# Patient Record
Sex: FEMALE | Race: BLACK OR AFRICAN AMERICAN | NOT HISPANIC OR LATINO | URBAN - METROPOLITAN AREA
[De-identification: names, ages, dates, MRNs, and addresses within clinical notes are randomized per-mention and may not be internally consistent; named-entity substitution may affect disease eponyms.]

---

## 2021-01-01 ENCOUNTER — INPATIENT (INPATIENT)
Facility: HOSPITAL | Age: 71
LOS: 12 days | DRG: 64 | End: 2021-02-12
Attending: INTERNAL MEDICINE | Admitting: HOSPITALIST
Payer: MEDICARE

## 2021-01-01 ENCOUNTER — EMERGENCY (EMERGENCY)
Facility: HOSPITAL | Age: 71
LOS: 1 days | Discharge: ANOTHER HOSPITAL | End: 2021-01-01
Attending: EMERGENCY MEDICINE | Admitting: EMERGENCY MEDICINE
Payer: MEDICARE

## 2021-01-01 VITALS — RESPIRATION RATE: 34 BRPM

## 2021-01-01 VITALS
RESPIRATION RATE: 18 BRPM | SYSTOLIC BLOOD PRESSURE: 150 MMHG | OXYGEN SATURATION: 96 % | HEART RATE: 107 BPM | TEMPERATURE: 98 F | DIASTOLIC BLOOD PRESSURE: 100 MMHG

## 2021-01-01 VITALS
RESPIRATION RATE: 39 BRPM | SYSTOLIC BLOOD PRESSURE: 162 MMHG | OXYGEN SATURATION: 98 % | DIASTOLIC BLOOD PRESSURE: 95 MMHG | HEART RATE: 113 BPM | TEMPERATURE: 100 F

## 2021-01-01 VITALS
DIASTOLIC BLOOD PRESSURE: 106 MMHG | OXYGEN SATURATION: 95 % | HEIGHT: 64 IN | HEART RATE: 112 BPM | RESPIRATION RATE: 18 BRPM | TEMPERATURE: 100 F | SYSTOLIC BLOOD PRESSURE: 168 MMHG | WEIGHT: 220.02 LBS

## 2021-01-01 DIAGNOSIS — E11.9 TYPE 2 DIABETES MELLITUS WITHOUT COMPLICATIONS: ICD-10-CM

## 2021-01-01 DIAGNOSIS — Z00.00 ENCOUNTER FOR GENERAL ADULT MEDICAL EXAMINATION WITHOUT ABNORMAL FINDINGS: ICD-10-CM

## 2021-01-01 DIAGNOSIS — I10 ESSENTIAL (PRIMARY) HYPERTENSION: ICD-10-CM

## 2021-01-01 DIAGNOSIS — A41.9 SEPSIS, UNSPECIFIED ORGANISM: ICD-10-CM

## 2021-01-01 DIAGNOSIS — N39.0 URINARY TRACT INFECTION, SITE NOT SPECIFIED: ICD-10-CM

## 2021-01-01 DIAGNOSIS — J96.01 ACUTE RESPIRATORY FAILURE WITH HYPOXIA: ICD-10-CM

## 2021-01-01 DIAGNOSIS — G93.40 ENCEPHALOPATHY, UNSPECIFIED: ICD-10-CM

## 2021-01-01 DIAGNOSIS — I63.22 CEREBRAL INFARCTION DUE TO UNSPECIFIED OCCLUSION OR STENOSIS OF BASILAR ARTERY: ICD-10-CM

## 2021-01-01 DIAGNOSIS — I65.09 OCCLUSION AND STENOSIS OF UNSPECIFIED VERTEBRAL ARTERY: ICD-10-CM

## 2021-01-01 DIAGNOSIS — E11.65 TYPE 2 DIABETES MELLITUS WITH HYPERGLYCEMIA: ICD-10-CM

## 2021-01-01 DIAGNOSIS — U07.1 COVID-19: ICD-10-CM

## 2021-01-01 DIAGNOSIS — E78.5 HYPERLIPIDEMIA, UNSPECIFIED: ICD-10-CM

## 2021-01-01 LAB
-  AMIKACIN: SIGNIFICANT CHANGE UP
-  AMOXICILLIN/CLAVULANIC ACID: SIGNIFICANT CHANGE UP
-  AMPICILLIN/SULBACTAM: SIGNIFICANT CHANGE UP
-  AMPICILLIN: SIGNIFICANT CHANGE UP
-  AZTREONAM: SIGNIFICANT CHANGE UP
-  CEFAZOLIN: SIGNIFICANT CHANGE UP
-  CEFEPIME: SIGNIFICANT CHANGE UP
-  CEFOXITIN: SIGNIFICANT CHANGE UP
-  CEFTRIAXONE: SIGNIFICANT CHANGE UP
-  CIPROFLOXACIN: SIGNIFICANT CHANGE UP
-  ERTAPENEM: SIGNIFICANT CHANGE UP
-  GENTAMICIN: SIGNIFICANT CHANGE UP
-  LEVOFLOXACIN: SIGNIFICANT CHANGE UP
-  MEROPENEM: SIGNIFICANT CHANGE UP
-  NITROFURANTOIN: SIGNIFICANT CHANGE UP
-  PIPERACILLIN/TAZOBACTAM: SIGNIFICANT CHANGE UP
-  TIGECYCLINE: SIGNIFICANT CHANGE UP
-  TOBRAMYCIN: SIGNIFICANT CHANGE UP
-  TRIMETHOPRIM/SULFAMETHOXAZOLE: SIGNIFICANT CHANGE UP
24R-OH-CALCIDIOL SERPL-MCNC: 23.8 NG/ML — LOW (ref 30–80)
A1C WITH ESTIMATED AVERAGE GLUCOSE RESULT: 10.1 % — HIGH (ref 4–5.6)
ALBUMIN SERPL ELPH-MCNC: 2.4 G/DL — LOW (ref 3.3–5)
ALBUMIN SERPL ELPH-MCNC: 2.5 G/DL — LOW (ref 3.3–5)
ALBUMIN SERPL ELPH-MCNC: 2.8 G/DL — LOW (ref 3.3–5)
ALBUMIN SERPL ELPH-MCNC: 2.9 G/DL — LOW (ref 3.3–5)
ALBUMIN SERPL ELPH-MCNC: 2.9 G/DL — LOW (ref 3.3–5)
ALBUMIN SERPL ELPH-MCNC: 3 G/DL — LOW (ref 3.3–5)
ALBUMIN SERPL ELPH-MCNC: 3.1 G/DL — LOW (ref 3.3–5)
ALBUMIN SERPL ELPH-MCNC: 3.1 G/DL — LOW (ref 3.3–5)
ALBUMIN SERPL ELPH-MCNC: 3.2 G/DL — LOW (ref 3.3–5)
ALBUMIN SERPL ELPH-MCNC: 3.4 G/DL — SIGNIFICANT CHANGE UP (ref 3.3–5)
ALBUMIN SERPL ELPH-MCNC: 3.5 G/DL — SIGNIFICANT CHANGE UP (ref 3.3–5)
ALBUMIN SERPL ELPH-MCNC: 3.6 G/DL — SIGNIFICANT CHANGE UP (ref 3.3–5)
ALBUMIN SERPL ELPH-MCNC: 3.7 G/DL — SIGNIFICANT CHANGE UP (ref 3.3–5)
ALBUMIN SERPL ELPH-MCNC: 3.7 G/DL — SIGNIFICANT CHANGE UP (ref 3.3–5)
ALBUMIN SERPL ELPH-MCNC: 3.8 G/DL — SIGNIFICANT CHANGE UP (ref 3.3–5)
ALP SERPL-CCNC: 108 U/L — SIGNIFICANT CHANGE UP (ref 40–120)
ALP SERPL-CCNC: 122 U/L — HIGH (ref 40–120)
ALP SERPL-CCNC: 126 U/L — HIGH (ref 40–120)
ALP SERPL-CCNC: 132 U/L — HIGH (ref 40–120)
ALP SERPL-CCNC: 133 U/L — HIGH (ref 40–120)
ALP SERPL-CCNC: 140 U/L — HIGH (ref 40–120)
ALP SERPL-CCNC: 63 U/L — SIGNIFICANT CHANGE UP (ref 40–120)
ALP SERPL-CCNC: 65 U/L — SIGNIFICANT CHANGE UP (ref 40–120)
ALP SERPL-CCNC: 65 U/L — SIGNIFICANT CHANGE UP (ref 40–120)
ALP SERPL-CCNC: 68 U/L — SIGNIFICANT CHANGE UP (ref 40–120)
ALP SERPL-CCNC: 71 U/L — SIGNIFICANT CHANGE UP (ref 40–120)
ALP SERPL-CCNC: 73 U/L — SIGNIFICANT CHANGE UP (ref 40–120)
ALP SERPL-CCNC: 75 U/L — SIGNIFICANT CHANGE UP (ref 40–120)
ALP SERPL-CCNC: 77 U/L — SIGNIFICANT CHANGE UP (ref 40–120)
ALP SERPL-CCNC: 79 U/L — SIGNIFICANT CHANGE UP (ref 40–120)
ALP SERPL-CCNC: 80 U/L — SIGNIFICANT CHANGE UP (ref 40–120)
ALP SERPL-CCNC: 90 U/L — SIGNIFICANT CHANGE UP (ref 40–120)
ALT FLD-CCNC: 221 U/L — HIGH (ref 10–45)
ALT FLD-CCNC: 256 U/L — HIGH (ref 10–45)
ALT FLD-CCNC: 305 U/L — HIGH (ref 10–45)
ALT FLD-CCNC: 37 U/L — SIGNIFICANT CHANGE UP (ref 10–45)
ALT FLD-CCNC: 45 U/L — SIGNIFICANT CHANGE UP (ref 10–45)
ALT FLD-CCNC: 46 U/L — HIGH (ref 10–45)
ALT FLD-CCNC: 48 U/L — HIGH (ref 10–45)
ALT FLD-CCNC: 49 U/L — HIGH (ref 10–45)
ALT FLD-CCNC: 56 U/L — HIGH (ref 10–45)
ALT FLD-CCNC: 66 U/L — HIGH (ref 10–45)
ALT FLD-CCNC: 79 U/L — HIGH (ref 10–45)
ALT FLD-CCNC: 85 U/L — HIGH (ref 10–45)
ALT FLD-CCNC: SIGNIFICANT CHANGE UP U/L (ref 4–33)
AMYLASE P1 CFR SERPL: 1687 U/L — HIGH (ref 25–125)
AMYLASE P1 CFR SERPL: 1713 U/L — HIGH (ref 25–125)
ANION GAP SERPL CALC-SCNC: 10 MMOL/L — SIGNIFICANT CHANGE UP (ref 5–17)
ANION GAP SERPL CALC-SCNC: 10 MMOL/L — SIGNIFICANT CHANGE UP (ref 5–17)
ANION GAP SERPL CALC-SCNC: 11 MMOL/L — SIGNIFICANT CHANGE UP (ref 5–17)
ANION GAP SERPL CALC-SCNC: 11 MMOL/L — SIGNIFICANT CHANGE UP (ref 7–14)
ANION GAP SERPL CALC-SCNC: 12 MMOL/L — SIGNIFICANT CHANGE UP (ref 5–17)
ANION GAP SERPL CALC-SCNC: 13 MMOL/L — SIGNIFICANT CHANGE UP (ref 5–17)
ANION GAP SERPL CALC-SCNC: 14 MMOL/L — SIGNIFICANT CHANGE UP (ref 5–17)
ANION GAP SERPL CALC-SCNC: 14 MMOL/L — SIGNIFICANT CHANGE UP (ref 5–17)
ANION GAP SERPL CALC-SCNC: 14 MMOL/L — SIGNIFICANT CHANGE UP (ref 7–14)
ANION GAP SERPL CALC-SCNC: 15 MMOL/L — SIGNIFICANT CHANGE UP (ref 5–17)
ANION GAP SERPL CALC-SCNC: 15 MMOL/L — SIGNIFICANT CHANGE UP (ref 5–17)
APPEARANCE UR: CLEAR — SIGNIFICANT CHANGE UP
APPEARANCE UR: CLEAR — SIGNIFICANT CHANGE UP
APTT 50/50 2HOUR INCUB: 41.7 SEC — HIGH (ref 27.5–36.5)
APTT 50/50 MIX COMMENT: SIGNIFICANT CHANGE UP
APTT BLD: 20.9 SEC — LOW (ref 27.5–35.5)
APTT BLD: 23.3 SEC — LOW (ref 27.5–35.5)
APTT BLD: 25.4 SEC — LOW (ref 27.5–35.5)
APTT BLD: 25.4 SEC — LOW (ref 27.5–35.5)
APTT BLD: 26.3 SEC — LOW (ref 27.5–35.5)
APTT BLD: 26.7 SEC — LOW (ref 27.5–35.5)
APTT BLD: 37.7 SEC — HIGH (ref 27.5–36.5)
APTT BLD: 41.2 SEC — HIGH (ref 27.5–35.5)
APTT BLD: 41.2 SEC — HIGH (ref 27.5–35.5)
AST SERPL-CCNC: 239 U/L — HIGH (ref 10–40)
AST SERPL-CCNC: 337 U/L — HIGH (ref 10–40)
AST SERPL-CCNC: 37 U/L — SIGNIFICANT CHANGE UP (ref 10–40)
AST SERPL-CCNC: 38 U/L — SIGNIFICANT CHANGE UP (ref 10–40)
AST SERPL-CCNC: 40 U/L — SIGNIFICANT CHANGE UP (ref 10–40)
AST SERPL-CCNC: 42 U/L — HIGH (ref 10–40)
AST SERPL-CCNC: 43 U/L — HIGH (ref 10–40)
AST SERPL-CCNC: 45 U/L — HIGH (ref 10–40)
AST SERPL-CCNC: 45 U/L — HIGH (ref 10–40)
AST SERPL-CCNC: 46 U/L — HIGH (ref 10–40)
AST SERPL-CCNC: 46 U/L — HIGH (ref 10–40)
AST SERPL-CCNC: 492 U/L — HIGH (ref 10–40)
AST SERPL-CCNC: 52 U/L — HIGH (ref 10–40)
AST SERPL-CCNC: 59 U/L — HIGH (ref 10–40)
AST SERPL-CCNC: 63 U/L — HIGH (ref 10–40)
AST SERPL-CCNC: 69 U/L — HIGH (ref 10–40)
AST SERPL-CCNC: SIGNIFICANT CHANGE UP U/L (ref 4–32)
B PERT DNA SPEC QL NAA+PROBE: SIGNIFICANT CHANGE UP
B2 GLYCOPROT1 AB SER QL: NEGATIVE — SIGNIFICANT CHANGE UP
BACTERIA # UR AUTO: NEGATIVE — SIGNIFICANT CHANGE UP
BASE EXCESS BLDA CALC-SCNC: -0.7 MMOL/L — SIGNIFICANT CHANGE UP (ref -2–2)
BASE EXCESS BLDA CALC-SCNC: 1.9 MMOL/L — SIGNIFICANT CHANGE UP (ref -2–2)
BASE EXCESS BLDA CALC-SCNC: 2 MMOL/L — SIGNIFICANT CHANGE UP (ref -2–2)
BASE EXCESS BLDA CALC-SCNC: 6.4 MMOL/L — HIGH (ref -2–2)
BASOPHILS # BLD AUTO: 0 K/UL — SIGNIFICANT CHANGE UP (ref 0–0.2)
BASOPHILS # BLD AUTO: 0 K/UL — SIGNIFICANT CHANGE UP (ref 0–0.2)
BASOPHILS # BLD AUTO: 0.01 K/UL — SIGNIFICANT CHANGE UP (ref 0–0.2)
BASOPHILS # BLD AUTO: 0.01 K/UL — SIGNIFICANT CHANGE UP (ref 0–0.2)
BASOPHILS # BLD AUTO: 0.02 K/UL — SIGNIFICANT CHANGE UP (ref 0–0.2)
BASOPHILS # BLD AUTO: 0.02 K/UL — SIGNIFICANT CHANGE UP (ref 0–0.2)
BASOPHILS # BLD AUTO: 0.03 K/UL — SIGNIFICANT CHANGE UP (ref 0–0.2)
BASOPHILS # BLD AUTO: 0.03 K/UL — SIGNIFICANT CHANGE UP (ref 0–0.2)
BASOPHILS NFR BLD AUTO: 0 % — SIGNIFICANT CHANGE UP (ref 0–2)
BASOPHILS NFR BLD AUTO: 0 % — SIGNIFICANT CHANGE UP (ref 0–2)
BASOPHILS NFR BLD AUTO: 0.1 % — SIGNIFICANT CHANGE UP (ref 0–2)
BASOPHILS NFR BLD AUTO: 0.1 % — SIGNIFICANT CHANGE UP (ref 0–2)
BASOPHILS NFR BLD AUTO: 0.2 % — SIGNIFICANT CHANGE UP (ref 0–2)
BILIRUB DIRECT SERPL-MCNC: 0.3 MG/DL — HIGH (ref 0–0.2)
BILIRUB INDIRECT FLD-MCNC: 0.5 MG/DL — SIGNIFICANT CHANGE UP (ref 0.2–1)
BILIRUB SERPL-MCNC: 0.2 MG/DL — SIGNIFICANT CHANGE UP (ref 0.2–1.2)
BILIRUB SERPL-MCNC: 0.3 MG/DL — SIGNIFICANT CHANGE UP (ref 0.2–1.2)
BILIRUB SERPL-MCNC: 0.4 MG/DL — SIGNIFICANT CHANGE UP (ref 0.2–1.2)
BILIRUB SERPL-MCNC: 0.5 MG/DL — SIGNIFICANT CHANGE UP (ref 0.2–1.2)
BILIRUB SERPL-MCNC: 0.7 MG/DL — SIGNIFICANT CHANGE UP (ref 0.2–1.2)
BILIRUB SERPL-MCNC: 0.7 MG/DL — SIGNIFICANT CHANGE UP (ref 0.2–1.2)
BILIRUB SERPL-MCNC: 0.8 MG/DL — SIGNIFICANT CHANGE UP (ref 0.2–1.2)
BILIRUB SERPL-MCNC: 0.8 MG/DL — SIGNIFICANT CHANGE UP (ref 0.2–1.2)
BILIRUB SERPL-MCNC: 1.2 MG/DL — SIGNIFICANT CHANGE UP (ref 0.2–1.2)
BILIRUB SERPL-MCNC: 1.6 MG/DL — HIGH (ref 0.2–1.2)
BILIRUB SERPL-MCNC: 1.8 MG/DL — HIGH (ref 0.2–1.2)
BILIRUB SERPL-MCNC: <0.2 MG/DL — SIGNIFICANT CHANGE UP (ref 0.2–1.2)
BILIRUB UR-MCNC: NEGATIVE — SIGNIFICANT CHANGE UP
BILIRUB UR-MCNC: NEGATIVE — SIGNIFICANT CHANGE UP
BLD GP AB SCN SERPL QL: NEGATIVE — SIGNIFICANT CHANGE UP
BLOOD GAS VENOUS COMPREHENSIVE RESULT: SIGNIFICANT CHANGE UP
BUN SERPL-MCNC: 14 MG/DL — SIGNIFICANT CHANGE UP (ref 7–23)
BUN SERPL-MCNC: 15 MG/DL — SIGNIFICANT CHANGE UP (ref 7–23)
BUN SERPL-MCNC: 15 MG/DL — SIGNIFICANT CHANGE UP (ref 7–23)
BUN SERPL-MCNC: 16 MG/DL — SIGNIFICANT CHANGE UP (ref 7–23)
BUN SERPL-MCNC: 17 MG/DL — SIGNIFICANT CHANGE UP (ref 7–23)
BUN SERPL-MCNC: 19 MG/DL — SIGNIFICANT CHANGE UP (ref 7–23)
BUN SERPL-MCNC: 22 MG/DL — SIGNIFICANT CHANGE UP (ref 7–23)
BUN SERPL-MCNC: 23 MG/DL — SIGNIFICANT CHANGE UP (ref 7–23)
BUN SERPL-MCNC: 23 MG/DL — SIGNIFICANT CHANGE UP (ref 7–23)
BUN SERPL-MCNC: 24 MG/DL — HIGH (ref 7–23)
BUN SERPL-MCNC: 25 MG/DL — HIGH (ref 7–23)
BUN SERPL-MCNC: 28 MG/DL — HIGH (ref 7–23)
BUN SERPL-MCNC: 28 MG/DL — HIGH (ref 7–23)
BUN SERPL-MCNC: 29 MG/DL — HIGH (ref 7–23)
BUN SERPL-MCNC: 31 MG/DL — HIGH (ref 7–23)
BUN SERPL-MCNC: 32 MG/DL — HIGH (ref 7–23)
BUN SERPL-MCNC: 33 MG/DL — HIGH (ref 7–23)
BUN SERPL-MCNC: 36 MG/DL — HIGH (ref 7–23)
BUN SERPL-MCNC: 39 MG/DL — HIGH (ref 7–23)
C PNEUM DNA SPEC QL NAA+PROBE: SIGNIFICANT CHANGE UP
CA-I BLD-SCNC: 1.18 MMOL/L — SIGNIFICANT CHANGE UP (ref 1.12–1.3)
CALCIUM SERPL-MCNC: 7.7 MG/DL — LOW (ref 8.4–10.5)
CALCIUM SERPL-MCNC: 7.8 MG/DL — LOW (ref 8.4–10.5)
CALCIUM SERPL-MCNC: 8.2 MG/DL — LOW (ref 8.4–10.5)
CALCIUM SERPL-MCNC: 8.3 MG/DL — LOW (ref 8.4–10.5)
CALCIUM SERPL-MCNC: 8.3 MG/DL — LOW (ref 8.4–10.5)
CALCIUM SERPL-MCNC: 8.5 MG/DL — SIGNIFICANT CHANGE UP (ref 8.4–10.5)
CALCIUM SERPL-MCNC: 8.7 MG/DL — SIGNIFICANT CHANGE UP (ref 8.4–10.5)
CALCIUM SERPL-MCNC: 8.7 MG/DL — SIGNIFICANT CHANGE UP (ref 8.4–10.5)
CALCIUM SERPL-MCNC: 8.8 MG/DL — SIGNIFICANT CHANGE UP (ref 8.4–10.5)
CALCIUM SERPL-MCNC: 8.9 MG/DL — SIGNIFICANT CHANGE UP (ref 8.4–10.5)
CALCIUM SERPL-MCNC: 9 MG/DL — SIGNIFICANT CHANGE UP (ref 8.4–10.5)
CALCIUM SERPL-MCNC: 9 MG/DL — SIGNIFICANT CHANGE UP (ref 8.4–10.5)
CALCIUM SERPL-MCNC: 9.1 MG/DL — SIGNIFICANT CHANGE UP (ref 8.4–10.5)
CALCIUM SERPL-MCNC: 9.1 MG/DL — SIGNIFICANT CHANGE UP (ref 8.4–10.5)
CALCIUM SERPL-MCNC: 9.5 MG/DL — SIGNIFICANT CHANGE UP (ref 8.4–10.5)
CALCIUM SERPL-MCNC: 9.6 MG/DL — SIGNIFICANT CHANGE UP (ref 8.4–10.5)
CALCIUM SERPL-MCNC: 9.7 MG/DL — SIGNIFICANT CHANGE UP (ref 8.4–10.5)
CARDIOLIPIN AB SER-ACNC: NEGATIVE — SIGNIFICANT CHANGE UP
CHLORIDE SERPL-SCNC: 101 MMOL/L — SIGNIFICANT CHANGE UP (ref 98–107)
CHLORIDE SERPL-SCNC: 104 MMOL/L — SIGNIFICANT CHANGE UP (ref 96–108)
CHLORIDE SERPL-SCNC: 105 MMOL/L — SIGNIFICANT CHANGE UP (ref 96–108)
CHLORIDE SERPL-SCNC: 106 MMOL/L — SIGNIFICANT CHANGE UP (ref 96–108)
CHLORIDE SERPL-SCNC: 106 MMOL/L — SIGNIFICANT CHANGE UP (ref 98–107)
CHLORIDE SERPL-SCNC: 107 MMOL/L — SIGNIFICANT CHANGE UP (ref 96–108)
CHLORIDE SERPL-SCNC: 108 MMOL/L — SIGNIFICANT CHANGE UP (ref 96–108)
CHLORIDE SERPL-SCNC: 108 MMOL/L — SIGNIFICANT CHANGE UP (ref 96–108)
CHLORIDE SERPL-SCNC: 109 MMOL/L — HIGH (ref 96–108)
CHLORIDE SERPL-SCNC: 110 MMOL/L — HIGH (ref 96–108)
CHLORIDE SERPL-SCNC: 111 MMOL/L — HIGH (ref 96–108)
CHLORIDE SERPL-SCNC: 111 MMOL/L — HIGH (ref 96–108)
CHLORIDE SERPL-SCNC: 112 MMOL/L — HIGH (ref 96–108)
CHOLEST SERPL-MCNC: 227 MG/DL — HIGH
CO2 BLDA-SCNC: 27 MMOL/L — SIGNIFICANT CHANGE UP (ref 22–30)
CO2 BLDA-SCNC: 27 MMOL/L — SIGNIFICANT CHANGE UP (ref 22–30)
CO2 BLDA-SCNC: 28 MMOL/L — SIGNIFICANT CHANGE UP (ref 22–30)
CO2 BLDA-SCNC: 32 MMOL/L — HIGH (ref 22–30)
CO2 SERPL-SCNC: 19 MMOL/L — LOW (ref 22–31)
CO2 SERPL-SCNC: 21 MMOL/L — LOW (ref 22–31)
CO2 SERPL-SCNC: 21 MMOL/L — LOW (ref 22–31)
CO2 SERPL-SCNC: 22 MMOL/L — SIGNIFICANT CHANGE UP (ref 22–31)
CO2 SERPL-SCNC: 23 MMOL/L — SIGNIFICANT CHANGE UP (ref 22–31)
CO2 SERPL-SCNC: 25 MMOL/L — SIGNIFICANT CHANGE UP (ref 22–31)
CO2 SERPL-SCNC: 26 MMOL/L — SIGNIFICANT CHANGE UP (ref 22–31)
CO2 SERPL-SCNC: 27 MMOL/L — SIGNIFICANT CHANGE UP (ref 22–31)
CO2 SERPL-SCNC: 27 MMOL/L — SIGNIFICANT CHANGE UP (ref 22–31)
CO2 SERPL-SCNC: 28 MMOL/L — SIGNIFICANT CHANGE UP (ref 22–31)
CO2 SERPL-SCNC: 28 MMOL/L — SIGNIFICANT CHANGE UP (ref 22–31)
CO2 SERPL-SCNC: 29 MMOL/L — SIGNIFICANT CHANGE UP (ref 22–31)
COLOR SPEC: SIGNIFICANT CHANGE UP
COLOR SPEC: SIGNIFICANT CHANGE UP
CONFIRM APTT STACLOT: NEGATIVE — SIGNIFICANT CHANGE UP
CORTIS AM PEAK SERPL-MCNC: 20.5 UG/DL — HIGH (ref 6–18.4)
CREAT SERPL-MCNC: 0.83 MG/DL — SIGNIFICANT CHANGE UP (ref 0.5–1.3)
CREAT SERPL-MCNC: 0.87 MG/DL — SIGNIFICANT CHANGE UP (ref 0.5–1.3)
CREAT SERPL-MCNC: 0.9 MG/DL — SIGNIFICANT CHANGE UP (ref 0.5–1.3)
CREAT SERPL-MCNC: 0.91 MG/DL — SIGNIFICANT CHANGE UP (ref 0.5–1.3)
CREAT SERPL-MCNC: 0.93 MG/DL — SIGNIFICANT CHANGE UP (ref 0.5–1.3)
CREAT SERPL-MCNC: 0.93 MG/DL — SIGNIFICANT CHANGE UP (ref 0.5–1.3)
CREAT SERPL-MCNC: 0.94 MG/DL — SIGNIFICANT CHANGE UP (ref 0.5–1.3)
CREAT SERPL-MCNC: 0.95 MG/DL — SIGNIFICANT CHANGE UP (ref 0.5–1.3)
CREAT SERPL-MCNC: 0.97 MG/DL — SIGNIFICANT CHANGE UP (ref 0.5–1.3)
CREAT SERPL-MCNC: 0.98 MG/DL — SIGNIFICANT CHANGE UP (ref 0.5–1.3)
CREAT SERPL-MCNC: 1.03 MG/DL — SIGNIFICANT CHANGE UP (ref 0.5–1.3)
CREAT SERPL-MCNC: 1.03 MG/DL — SIGNIFICANT CHANGE UP (ref 0.5–1.3)
CREAT SERPL-MCNC: 1.05 MG/DL — SIGNIFICANT CHANGE UP (ref 0.5–1.3)
CREAT SERPL-MCNC: 1.1 MG/DL — SIGNIFICANT CHANGE UP (ref 0.5–1.3)
CREAT SERPL-MCNC: 1.14 MG/DL — SIGNIFICANT CHANGE UP (ref 0.5–1.3)
CREAT SERPL-MCNC: 1.17 MG/DL — SIGNIFICANT CHANGE UP (ref 0.5–1.3)
CREAT SERPL-MCNC: 1.29 MG/DL — SIGNIFICANT CHANGE UP (ref 0.5–1.3)
CREAT SERPL-MCNC: 1.47 MG/DL — HIGH (ref 0.5–1.3)
CREAT SERPL-MCNC: 1.49 MG/DL — HIGH (ref 0.5–1.3)
CRP SERPL-MCNC: 1.44 MG/DL — HIGH (ref 0–0.4)
CRP SERPL-MCNC: 1.81 MG/DL — HIGH (ref 0–0.4)
CRP SERPL-MCNC: 2.77 MG/DL — HIGH (ref 0–0.4)
CRP SERPL-MCNC: 3.59 MG/DL — HIGH (ref 0–0.4)
CRP SERPL-MCNC: 3.77 MG/DL — HIGH (ref 0–0.4)
CRP SERPL-MCNC: 33.4 MG/DL — HIGH (ref 0–0.4)
CRP SERPL-MCNC: 34.35 MG/DL — HIGH (ref 0–0.4)
CRP SERPL-MCNC: 5.02 MG/DL — HIGH (ref 0–0.4)
CULTURE RESULTS: NO GROWTH — SIGNIFICANT CHANGE UP
CULTURE RESULTS: SIGNIFICANT CHANGE UP
D DIMER BLD IA.RAPID-MCNC: 1109 NG/ML DDU — HIGH
D DIMER BLD IA.RAPID-MCNC: 185 NG/ML DDU — SIGNIFICANT CHANGE UP
D DIMER BLD IA.RAPID-MCNC: 2662 NG/ML DDU — HIGH
D DIMER BLD IA.RAPID-MCNC: 339 NG/ML DDU — HIGH
D DIMER BLD IA.RAPID-MCNC: 480 NG/ML DDU — HIGH
D DIMER BLD IA.RAPID-MCNC: <150 NG/ML DDU — SIGNIFICANT CHANGE UP
D DIMER BLD IA.RAPID-MCNC: <150 NG/ML DDU — SIGNIFICANT CHANGE UP
D DIMER BLD IA.RAPID-MCNC: HIGH NG/ML DDU
D DIMER BLD IA.RAPID-MCNC: HIGH NG/ML DDU
DIFF PNL FLD: ABNORMAL
DIFF PNL FLD: ABNORMAL
DRVVT SCREEN TO CONFIRM RATIO: SIGNIFICANT CHANGE UP
DRVVT SCREEN TO CONFIRM RATIO: SIGNIFICANT CHANGE UP
EOSINOPHIL # BLD AUTO: 0 K/UL — SIGNIFICANT CHANGE UP (ref 0–0.5)
EOSINOPHIL # BLD AUTO: 0.06 K/UL — SIGNIFICANT CHANGE UP (ref 0–0.5)
EOSINOPHIL # BLD AUTO: 0.1 K/UL — SIGNIFICANT CHANGE UP (ref 0–0.5)
EOSINOPHIL NFR BLD AUTO: 0 % — SIGNIFICANT CHANGE UP (ref 0–6)
EOSINOPHIL NFR BLD AUTO: 0.3 % — SIGNIFICANT CHANGE UP (ref 0–6)
EOSINOPHIL NFR BLD AUTO: 0.5 % — SIGNIFICANT CHANGE UP (ref 0–6)
EPI CELLS # UR: 2 /HPF — SIGNIFICANT CHANGE UP
ESTIMATED AVERAGE GLUCOSE: 243 MG/DL — HIGH (ref 68–114)
FERRITIN SERPL-MCNC: 1014 NG/ML — HIGH (ref 15–150)
FERRITIN SERPL-MCNC: 1276 NG/ML — HIGH (ref 15–150)
FERRITIN SERPL-MCNC: 337 NG/ML — HIGH (ref 15–150)
FERRITIN SERPL-MCNC: 391 NG/ML — HIGH (ref 15–150)
FERRITIN SERPL-MCNC: 466 NG/ML — HIGH (ref 15–150)
FERRITIN SERPL-MCNC: 500 NG/ML — HIGH (ref 15–150)
FERRITIN SERPL-MCNC: 530 NG/ML — HIGH (ref 15–150)
FERRITIN SERPL-MCNC: 574 NG/ML — HIGH (ref 15–150)
FIBRINOGEN PPP-MCNC: 614 MG/DL — HIGH (ref 290–520)
FLUAV H1 2009 PAND RNA SPEC QL NAA+PROBE: SIGNIFICANT CHANGE UP
FLUAV H1 RNA SPEC QL NAA+PROBE: SIGNIFICANT CHANGE UP
FLUAV H3 RNA SPEC QL NAA+PROBE: SIGNIFICANT CHANGE UP
FLUAV SUBTYP SPEC NAA+PROBE: SIGNIFICANT CHANGE UP
FLUBV RNA SPEC QL NAA+PROBE: SIGNIFICANT CHANGE UP
GAS PNL BLDA: SIGNIFICANT CHANGE UP
GAS PNL BLDV: SIGNIFICANT CHANGE UP
GLUCOSE BLDC GLUCOMTR-MCNC: 105 MG/DL — HIGH (ref 70–99)
GLUCOSE BLDC GLUCOMTR-MCNC: 110 MG/DL — HIGH (ref 70–99)
GLUCOSE BLDC GLUCOMTR-MCNC: 152 MG/DL — HIGH (ref 70–99)
GLUCOSE BLDC GLUCOMTR-MCNC: 153 MG/DL — HIGH (ref 70–99)
GLUCOSE BLDC GLUCOMTR-MCNC: 162 MG/DL — HIGH (ref 70–99)
GLUCOSE BLDC GLUCOMTR-MCNC: 168 MG/DL — HIGH (ref 70–99)
GLUCOSE BLDC GLUCOMTR-MCNC: 194 MG/DL — HIGH (ref 70–99)
GLUCOSE BLDC GLUCOMTR-MCNC: 195 MG/DL — HIGH (ref 70–99)
GLUCOSE BLDC GLUCOMTR-MCNC: 195 MG/DL — HIGH (ref 70–99)
GLUCOSE BLDC GLUCOMTR-MCNC: 222 MG/DL — HIGH (ref 70–99)
GLUCOSE BLDC GLUCOMTR-MCNC: 229 MG/DL — HIGH (ref 70–99)
GLUCOSE BLDC GLUCOMTR-MCNC: 244 MG/DL — HIGH (ref 70–99)
GLUCOSE BLDC GLUCOMTR-MCNC: 244 MG/DL — HIGH (ref 70–99)
GLUCOSE BLDC GLUCOMTR-MCNC: 252 MG/DL — HIGH (ref 70–99)
GLUCOSE BLDC GLUCOMTR-MCNC: 257 MG/DL — HIGH (ref 70–99)
GLUCOSE BLDC GLUCOMTR-MCNC: 276 MG/DL — HIGH (ref 70–99)
GLUCOSE BLDC GLUCOMTR-MCNC: 286 MG/DL — HIGH (ref 70–99)
GLUCOSE BLDC GLUCOMTR-MCNC: 300 MG/DL — HIGH (ref 70–99)
GLUCOSE BLDC GLUCOMTR-MCNC: 302 MG/DL — HIGH (ref 70–99)
GLUCOSE BLDC GLUCOMTR-MCNC: 321 MG/DL — HIGH (ref 70–99)
GLUCOSE BLDC GLUCOMTR-MCNC: 325 MG/DL — HIGH (ref 70–99)
GLUCOSE BLDC GLUCOMTR-MCNC: 377 MG/DL — HIGH (ref 70–99)
GLUCOSE BLDC GLUCOMTR-MCNC: 385 MG/DL — HIGH (ref 70–99)
GLUCOSE BLDC GLUCOMTR-MCNC: 391 MG/DL — HIGH (ref 70–99)
GLUCOSE BLDC GLUCOMTR-MCNC: 399 MG/DL — HIGH (ref 70–99)
GLUCOSE BLDC GLUCOMTR-MCNC: 403 MG/DL — HIGH (ref 70–99)
GLUCOSE BLDC GLUCOMTR-MCNC: 412 MG/DL — HIGH (ref 70–99)
GLUCOSE BLDC GLUCOMTR-MCNC: 429 MG/DL — HIGH (ref 70–99)
GLUCOSE BLDC GLUCOMTR-MCNC: 430 MG/DL — HIGH (ref 70–99)
GLUCOSE BLDC GLUCOMTR-MCNC: 435 MG/DL — HIGH (ref 70–99)
GLUCOSE BLDC GLUCOMTR-MCNC: 437 MG/DL — HIGH (ref 70–99)
GLUCOSE BLDC GLUCOMTR-MCNC: 446 MG/DL — HIGH (ref 70–99)
GLUCOSE BLDC GLUCOMTR-MCNC: 449 MG/DL — HIGH (ref 70–99)
GLUCOSE BLDC GLUCOMTR-MCNC: 458 MG/DL — CRITICAL HIGH (ref 70–99)
GLUCOSE BLDC GLUCOMTR-MCNC: 459 MG/DL — CRITICAL HIGH (ref 70–99)
GLUCOSE BLDC GLUCOMTR-MCNC: 459 MG/DL — CRITICAL HIGH (ref 70–99)
GLUCOSE BLDC GLUCOMTR-MCNC: 461 MG/DL — CRITICAL HIGH (ref 70–99)
GLUCOSE BLDC GLUCOMTR-MCNC: 465 MG/DL — CRITICAL HIGH (ref 70–99)
GLUCOSE BLDC GLUCOMTR-MCNC: 470 MG/DL — CRITICAL HIGH (ref 70–99)
GLUCOSE BLDC GLUCOMTR-MCNC: 471 MG/DL — CRITICAL HIGH (ref 70–99)
GLUCOSE BLDC GLUCOMTR-MCNC: 473 MG/DL — CRITICAL HIGH (ref 70–99)
GLUCOSE BLDC GLUCOMTR-MCNC: 484 MG/DL — CRITICAL HIGH (ref 70–99)
GLUCOSE BLDC GLUCOMTR-MCNC: 484 MG/DL — CRITICAL HIGH (ref 70–99)
GLUCOSE BLDC GLUCOMTR-MCNC: 79 MG/DL — SIGNIFICANT CHANGE UP (ref 70–99)
GLUCOSE SERPL-MCNC: 123 MG/DL — HIGH (ref 70–99)
GLUCOSE SERPL-MCNC: 151 MG/DL — HIGH (ref 70–99)
GLUCOSE SERPL-MCNC: 167 MG/DL — HIGH (ref 70–99)
GLUCOSE SERPL-MCNC: 173 MG/DL — HIGH (ref 70–99)
GLUCOSE SERPL-MCNC: 187 MG/DL — HIGH (ref 70–99)
GLUCOSE SERPL-MCNC: 209 MG/DL — HIGH (ref 70–99)
GLUCOSE SERPL-MCNC: 224 MG/DL — HIGH (ref 70–99)
GLUCOSE SERPL-MCNC: 242 MG/DL — HIGH (ref 70–99)
GLUCOSE SERPL-MCNC: 272 MG/DL — HIGH (ref 70–99)
GLUCOSE SERPL-MCNC: 292 MG/DL — HIGH (ref 70–99)
GLUCOSE SERPL-MCNC: 295 MG/DL — HIGH (ref 70–99)
GLUCOSE SERPL-MCNC: 308 MG/DL — HIGH (ref 70–99)
GLUCOSE SERPL-MCNC: 316 MG/DL — HIGH (ref 70–99)
GLUCOSE SERPL-MCNC: 328 MG/DL — HIGH (ref 70–99)
GLUCOSE SERPL-MCNC: 334 MG/DL — HIGH (ref 70–99)
GLUCOSE SERPL-MCNC: 357 MG/DL — HIGH (ref 70–99)
GLUCOSE SERPL-MCNC: 378 MG/DL — HIGH (ref 70–99)
GLUCOSE SERPL-MCNC: 94 MG/DL — SIGNIFICANT CHANGE UP (ref 70–99)
GLUCOSE SERPL-MCNC: 95 MG/DL — SIGNIFICANT CHANGE UP (ref 70–99)
GLUCOSE UR QL: NEGATIVE — SIGNIFICANT CHANGE UP
GLUCOSE UR QL: NEGATIVE — SIGNIFICANT CHANGE UP
GRAM STN FLD: SIGNIFICANT CHANGE UP
HADV DNA SPEC QL NAA+PROBE: SIGNIFICANT CHANGE UP
HCO3 BLDA-SCNC: 25 MMOL/L — SIGNIFICANT CHANGE UP (ref 21–29)
HCO3 BLDA-SCNC: 26 MMOL/L — SIGNIFICANT CHANGE UP (ref 21–29)
HCO3 BLDA-SCNC: 26 MMOL/L — SIGNIFICANT CHANGE UP (ref 21–29)
HCO3 BLDA-SCNC: 30 MMOL/L — HIGH (ref 21–29)
HCOV PNL SPEC NAA+PROBE: SIGNIFICANT CHANGE UP
HCT VFR BLD CALC: 29.3 % — LOW (ref 34.5–45)
HCT VFR BLD CALC: 30.3 % — LOW (ref 34.5–45)
HCT VFR BLD CALC: 31.8 % — LOW (ref 34.5–45)
HCT VFR BLD CALC: 35.3 % — SIGNIFICANT CHANGE UP (ref 34.5–45)
HCT VFR BLD CALC: 38.1 % — SIGNIFICANT CHANGE UP (ref 34.5–45)
HCT VFR BLD CALC: 38.7 % — SIGNIFICANT CHANGE UP (ref 34.5–45)
HCT VFR BLD CALC: 38.8 % — SIGNIFICANT CHANGE UP (ref 34.5–45)
HCT VFR BLD CALC: 39.2 % — SIGNIFICANT CHANGE UP (ref 34.5–45)
HCT VFR BLD CALC: 43 % — SIGNIFICANT CHANGE UP (ref 34.5–45)
HCT VFR BLD CALC: 44 % — SIGNIFICANT CHANGE UP (ref 34.5–45)
HCT VFR BLD CALC: 44 % — SIGNIFICANT CHANGE UP (ref 34.5–45)
HCT VFR BLD CALC: 44.4 % — SIGNIFICANT CHANGE UP (ref 34.5–45)
HCT VFR BLD CALC: 45 % — SIGNIFICANT CHANGE UP (ref 34.5–45)
HCT VFR BLD CALC: 45.2 % — HIGH (ref 34.5–45)
HCT VFR BLD CALC: 45.8 % — HIGH (ref 34.5–45)
HCT VFR BLD CALC: 45.8 % — HIGH (ref 34.5–45)
HCT VFR BLD CALC: 46.8 % — HIGH (ref 34.5–45)
HCT VFR BLD CALC: 47 % — HIGH (ref 34.5–45)
HCV AB S/CO SERPL IA: 0.09 S/CO — SIGNIFICANT CHANGE UP (ref 0–0.99)
HCV AB SERPL-IMP: SIGNIFICANT CHANGE UP
HDLC SERPL-MCNC: 44 MG/DL — LOW
HEPARINASE TEG R TIME: 7.2 MIN — SIGNIFICANT CHANGE UP (ref 4.3–8.3)
HGB BLD-MCNC: 10.9 G/DL — LOW (ref 11.5–15.5)
HGB BLD-MCNC: 12.2 G/DL — SIGNIFICANT CHANGE UP (ref 11.5–15.5)
HGB BLD-MCNC: 12.4 G/DL — SIGNIFICANT CHANGE UP (ref 11.5–15.5)
HGB BLD-MCNC: 12.7 G/DL — SIGNIFICANT CHANGE UP (ref 11.5–15.5)
HGB BLD-MCNC: 12.8 G/DL — SIGNIFICANT CHANGE UP (ref 11.5–15.5)
HGB BLD-MCNC: 13.8 G/DL — SIGNIFICANT CHANGE UP (ref 11.5–15.5)
HGB BLD-MCNC: 14.2 G/DL — SIGNIFICANT CHANGE UP (ref 11.5–15.5)
HGB BLD-MCNC: 14.3 G/DL — SIGNIFICANT CHANGE UP (ref 11.5–15.5)
HGB BLD-MCNC: 14.3 G/DL — SIGNIFICANT CHANGE UP (ref 11.5–15.5)
HGB BLD-MCNC: 14.5 G/DL — SIGNIFICANT CHANGE UP (ref 11.5–15.5)
HGB BLD-MCNC: 14.7 G/DL — SIGNIFICANT CHANGE UP (ref 11.5–15.5)
HGB BLD-MCNC: 15.2 G/DL — SIGNIFICANT CHANGE UP (ref 11.5–15.5)
HGB BLD-MCNC: 15.3 G/DL — SIGNIFICANT CHANGE UP (ref 11.5–15.5)
HGB BLD-MCNC: 15.4 G/DL — SIGNIFICANT CHANGE UP (ref 11.5–15.5)
HGB BLD-MCNC: 15.7 G/DL — HIGH (ref 11.5–15.5)
HGB BLD-MCNC: 9 G/DL — LOW (ref 11.5–15.5)
HGB BLD-MCNC: 9.4 G/DL — LOW (ref 11.5–15.5)
HGB BLD-MCNC: 9.7 G/DL — LOW (ref 11.5–15.5)
HMPV RNA SPEC QL NAA+PROBE: SIGNIFICANT CHANGE UP
HOROWITZ INDEX BLDA+IHG-RTO: 100 — SIGNIFICANT CHANGE UP
HOROWITZ INDEX BLDA+IHG-RTO: 100 — SIGNIFICANT CHANGE UP
HOROWITZ INDEX BLDA+IHG-RTO: 80 — SIGNIFICANT CHANGE UP
HPIV1 RNA SPEC QL NAA+PROBE: SIGNIFICANT CHANGE UP
HPIV2 RNA SPEC QL NAA+PROBE: SIGNIFICANT CHANGE UP
HPIV3 RNA SPEC QL NAA+PROBE: SIGNIFICANT CHANGE UP
HPIV4 RNA SPEC QL NAA+PROBE: SIGNIFICANT CHANGE UP
HYALINE CASTS # UR AUTO: 15 /LPF — HIGH (ref 0–2)
IANC: 6.34 K/UL — SIGNIFICANT CHANGE UP (ref 1.5–8.5)
IMM GRANULOCYTES NFR BLD AUTO: 0.3 % — SIGNIFICANT CHANGE UP (ref 0–1.5)
IMM GRANULOCYTES NFR BLD AUTO: 0.3 % — SIGNIFICANT CHANGE UP (ref 0–1.5)
IMM GRANULOCYTES NFR BLD AUTO: 0.5 % — SIGNIFICANT CHANGE UP (ref 0–1.5)
IMM GRANULOCYTES NFR BLD AUTO: 0.6 % — SIGNIFICANT CHANGE UP (ref 0–1.5)
IMM GRANULOCYTES NFR BLD AUTO: 1.2 % — SIGNIFICANT CHANGE UP (ref 0–1.5)
IMM GRANULOCYTES NFR BLD AUTO: 1.8 % — HIGH (ref 0–1.5)
INR BLD: 0.99 RATIO — SIGNIFICANT CHANGE UP (ref 0.88–1.16)
INR BLD: 1.09 RATIO — SIGNIFICANT CHANGE UP (ref 0.88–1.16)
INR BLD: 1.21 RATIO — HIGH (ref 0.88–1.16)
INR BLD: 1.6 RATIO — HIGH (ref 0.88–1.16)
INR BLD: 1.75 RATIO — HIGH (ref 0.88–1.16)
INR BLD: 1.79 RATIO — HIGH (ref 0.88–1.16)
KETONES UR-MCNC: ABNORMAL
KETONES UR-MCNC: NEGATIVE — SIGNIFICANT CHANGE UP
LA NT DPL PPP QL: 42.6 SEC — SIGNIFICANT CHANGE UP
LA NT DPL PPP QL: 44.1 SEC — SIGNIFICANT CHANGE UP
LACTATE SERPL-SCNC: 2.9 MMOL/L — HIGH (ref 0.7–2)
LDH SERPL L TO P-CCNC: 1210 U/L — HIGH (ref 50–242)
LDH SERPL L TO P-CCNC: 1323 U/L — HIGH (ref 50–242)
LEUKOCYTE ESTERASE UR-ACNC: ABNORMAL
LEUKOCYTE ESTERASE UR-ACNC: NEGATIVE — SIGNIFICANT CHANGE UP
LIDOCAIN IGE QN: 21 U/L — SIGNIFICANT CHANGE UP (ref 7–60)
LIPID PNL WITH DIRECT LDL SERPL: 150 MG/DL — HIGH
LYMPHOCYTES # BLD AUTO: 0.76 K/UL — LOW (ref 1–3.3)
LYMPHOCYTES # BLD AUTO: 1.01 K/UL — SIGNIFICANT CHANGE UP (ref 1–3.3)
LYMPHOCYTES # BLD AUTO: 1.02 K/UL — SIGNIFICANT CHANGE UP (ref 1–3.3)
LYMPHOCYTES # BLD AUTO: 1.37 K/UL — SIGNIFICANT CHANGE UP (ref 1–3.3)
LYMPHOCYTES # BLD AUTO: 1.43 K/UL — SIGNIFICANT CHANGE UP (ref 1–3.3)
LYMPHOCYTES # BLD AUTO: 1.47 K/UL — SIGNIFICANT CHANGE UP (ref 1–3.3)
LYMPHOCYTES # BLD AUTO: 1.54 K/UL — SIGNIFICANT CHANGE UP (ref 1–3.3)
LYMPHOCYTES # BLD AUTO: 10.4 % — LOW (ref 13–44)
LYMPHOCYTES # BLD AUTO: 14.2 % — SIGNIFICANT CHANGE UP (ref 13–44)
LYMPHOCYTES # BLD AUTO: 14.5 % — SIGNIFICANT CHANGE UP (ref 13–44)
LYMPHOCYTES # BLD AUTO: 17.4 % — SIGNIFICANT CHANGE UP (ref 13–44)
LYMPHOCYTES # BLD AUTO: 2.04 K/UL — SIGNIFICANT CHANGE UP (ref 1–3.3)
LYMPHOCYTES # BLD AUTO: 20.7 % — SIGNIFICANT CHANGE UP (ref 13–44)
LYMPHOCYTES # BLD AUTO: 3.8 % — LOW (ref 13–44)
LYMPHOCYTES # BLD AUTO: 6.2 % — LOW (ref 13–44)
LYMPHOCYTES # BLD AUTO: 7.3 % — LOW (ref 13–44)
MAGNESIUM SERPL-MCNC: 2 MG/DL — SIGNIFICANT CHANGE UP (ref 1.6–2.6)
MAGNESIUM SERPL-MCNC: 2.2 MG/DL — SIGNIFICANT CHANGE UP (ref 1.6–2.6)
MAGNESIUM SERPL-MCNC: 2.7 MG/DL — HIGH (ref 1.6–2.6)
MAGNESIUM SERPL-MCNC: 2.8 MG/DL — HIGH (ref 1.6–2.6)
MAGNESIUM SERPL-MCNC: 2.8 MG/DL — HIGH (ref 1.6–2.6)
MAGNESIUM SERPL-MCNC: 2.9 MG/DL — HIGH (ref 1.6–2.6)
MCHC RBC-ENTMCNC: 27.9 PG — SIGNIFICANT CHANGE UP (ref 27–34)
MCHC RBC-ENTMCNC: 28.1 PG — SIGNIFICANT CHANGE UP (ref 27–34)
MCHC RBC-ENTMCNC: 28.1 PG — SIGNIFICANT CHANGE UP (ref 27–34)
MCHC RBC-ENTMCNC: 28.2 PG — SIGNIFICANT CHANGE UP (ref 27–34)
MCHC RBC-ENTMCNC: 28.3 PG — SIGNIFICANT CHANGE UP (ref 27–34)
MCHC RBC-ENTMCNC: 28.4 PG — SIGNIFICANT CHANGE UP (ref 27–34)
MCHC RBC-ENTMCNC: 28.5 PG — SIGNIFICANT CHANGE UP (ref 27–34)
MCHC RBC-ENTMCNC: 28.7 PG — SIGNIFICANT CHANGE UP (ref 27–34)
MCHC RBC-ENTMCNC: 28.7 PG — SIGNIFICANT CHANGE UP (ref 27–34)
MCHC RBC-ENTMCNC: 28.8 PG — SIGNIFICANT CHANGE UP (ref 27–34)
MCHC RBC-ENTMCNC: 28.9 PG — SIGNIFICANT CHANGE UP (ref 27–34)
MCHC RBC-ENTMCNC: 29 PG — SIGNIFICANT CHANGE UP (ref 27–34)
MCHC RBC-ENTMCNC: 30.5 GM/DL — LOW (ref 32–36)
MCHC RBC-ENTMCNC: 30.7 GM/DL — LOW (ref 32–36)
MCHC RBC-ENTMCNC: 30.9 GM/DL — LOW (ref 32–36)
MCHC RBC-ENTMCNC: 31 GM/DL — LOW (ref 32–36)
MCHC RBC-ENTMCNC: 32 GM/DL — SIGNIFICANT CHANGE UP (ref 32–36)
MCHC RBC-ENTMCNC: 32 GM/DL — SIGNIFICANT CHANGE UP (ref 32–36)
MCHC RBC-ENTMCNC: 32.1 GM/DL — SIGNIFICANT CHANGE UP (ref 32–36)
MCHC RBC-ENTMCNC: 32.2 GM/DL — SIGNIFICANT CHANGE UP (ref 32–36)
MCHC RBC-ENTMCNC: 32.2 GM/DL — SIGNIFICANT CHANGE UP (ref 32–36)
MCHC RBC-ENTMCNC: 32.3 GM/DL — SIGNIFICANT CHANGE UP (ref 32–36)
MCHC RBC-ENTMCNC: 32.5 GM/DL — SIGNIFICANT CHANGE UP (ref 32–36)
MCHC RBC-ENTMCNC: 32.5 GM/DL — SIGNIFICANT CHANGE UP (ref 32–36)
MCHC RBC-ENTMCNC: 32.7 GM/DL — SIGNIFICANT CHANGE UP (ref 32–36)
MCHC RBC-ENTMCNC: 32.8 GM/DL — SIGNIFICANT CHANGE UP (ref 32–36)
MCHC RBC-ENTMCNC: 32.9 GM/DL — SIGNIFICANT CHANGE UP (ref 32–36)
MCHC RBC-ENTMCNC: 33.2 GM/DL — SIGNIFICANT CHANGE UP (ref 32–36)
MCHC RBC-ENTMCNC: 33.4 GM/DL — SIGNIFICANT CHANGE UP (ref 32–36)
MCHC RBC-ENTMCNC: 33.4 GM/DL — SIGNIFICANT CHANGE UP (ref 32–36)
MCV RBC AUTO: 86.1 FL — SIGNIFICANT CHANGE UP (ref 80–100)
MCV RBC AUTO: 86.3 FL — SIGNIFICANT CHANGE UP (ref 80–100)
MCV RBC AUTO: 86.3 FL — SIGNIFICANT CHANGE UP (ref 80–100)
MCV RBC AUTO: 86.4 FL — SIGNIFICANT CHANGE UP (ref 80–100)
MCV RBC AUTO: 87.1 FL — SIGNIFICANT CHANGE UP (ref 80–100)
MCV RBC AUTO: 87.2 FL — SIGNIFICANT CHANGE UP (ref 80–100)
MCV RBC AUTO: 87.2 FL — SIGNIFICANT CHANGE UP (ref 80–100)
MCV RBC AUTO: 87.3 FL — SIGNIFICANT CHANGE UP (ref 80–100)
MCV RBC AUTO: 87.3 FL — SIGNIFICANT CHANGE UP (ref 80–100)
MCV RBC AUTO: 88 FL — SIGNIFICANT CHANGE UP (ref 80–100)
MCV RBC AUTO: 88.4 FL — SIGNIFICANT CHANGE UP (ref 80–100)
MCV RBC AUTO: 88.4 FL — SIGNIFICANT CHANGE UP (ref 80–100)
MCV RBC AUTO: 89 FL — SIGNIFICANT CHANGE UP (ref 80–100)
MCV RBC AUTO: 89.4 FL — SIGNIFICANT CHANGE UP (ref 80–100)
MCV RBC AUTO: 92.2 FL — SIGNIFICANT CHANGE UP (ref 80–100)
MCV RBC AUTO: 92.2 FL — SIGNIFICANT CHANGE UP (ref 80–100)
MCV RBC AUTO: 92.9 FL — SIGNIFICANT CHANGE UP (ref 80–100)
MCV RBC AUTO: 93.9 FL — SIGNIFICANT CHANGE UP (ref 80–100)
METHOD TYPE: SIGNIFICANT CHANGE UP
MONOCYTES # BLD AUTO: 0.39 K/UL — SIGNIFICANT CHANGE UP (ref 0–0.9)
MONOCYTES # BLD AUTO: 0.46 K/UL — SIGNIFICANT CHANGE UP (ref 0–0.9)
MONOCYTES # BLD AUTO: 0.51 K/UL — SIGNIFICANT CHANGE UP (ref 0–0.9)
MONOCYTES # BLD AUTO: 0.52 K/UL — SIGNIFICANT CHANGE UP (ref 0–0.9)
MONOCYTES # BLD AUTO: 0.55 K/UL — SIGNIFICANT CHANGE UP (ref 0–0.9)
MONOCYTES # BLD AUTO: 0.57 K/UL — SIGNIFICANT CHANGE UP (ref 0–0.9)
MONOCYTES # BLD AUTO: 0.59 K/UL — SIGNIFICANT CHANGE UP (ref 0–0.9)
MONOCYTES # BLD AUTO: 1.4 K/UL — HIGH (ref 0–0.9)
MONOCYTES NFR BLD AUTO: 14.2 % — HIGH (ref 2–14)
MONOCYTES NFR BLD AUTO: 2.4 % — SIGNIFICANT CHANGE UP (ref 2–14)
MONOCYTES NFR BLD AUTO: 2.9 % — SIGNIFICANT CHANGE UP (ref 2–14)
MONOCYTES NFR BLD AUTO: 3.6 % — SIGNIFICANT CHANGE UP (ref 2–14)
MONOCYTES NFR BLD AUTO: 4 % — SIGNIFICANT CHANGE UP (ref 2–14)
MONOCYTES NFR BLD AUTO: 5.1 % — SIGNIFICANT CHANGE UP (ref 2–14)
MONOCYTES NFR BLD AUTO: 5.3 % — SIGNIFICANT CHANGE UP (ref 2–14)
MONOCYTES NFR BLD AUTO: 6.2 % — SIGNIFICANT CHANGE UP (ref 2–14)
MRSA PCR RESULT.: SIGNIFICANT CHANGE UP
NEUTROPHILS # BLD AUTO: 14.78 K/UL — HIGH (ref 1.8–7.4)
NEUTROPHILS # BLD AUTO: 17.21 K/UL — HIGH (ref 1.8–7.4)
NEUTROPHILS # BLD AUTO: 18.29 K/UL — HIGH (ref 1.8–7.4)
NEUTROPHILS # BLD AUTO: 6.34 K/UL — SIGNIFICANT CHANGE UP (ref 1.8–7.4)
NEUTROPHILS # BLD AUTO: 6.72 K/UL — SIGNIFICANT CHANGE UP (ref 1.8–7.4)
NEUTROPHILS # BLD AUTO: 7.71 K/UL — HIGH (ref 1.8–7.4)
NEUTROPHILS # BLD AUTO: 8.09 K/UL — HIGH (ref 1.8–7.4)
NEUTROPHILS # BLD AUTO: 8.21 K/UL — HIGH (ref 1.8–7.4)
NEUTROPHILS NFR BLD AUTO: 64.4 % — SIGNIFICANT CHANGE UP (ref 43–77)
NEUTROPHILS NFR BLD AUTO: 75.9 % — SIGNIFICANT CHANGE UP (ref 43–77)
NEUTROPHILS NFR BLD AUTO: 77 % — SIGNIFICANT CHANGE UP (ref 43–77)
NEUTROPHILS NFR BLD AUTO: 80 % — HIGH (ref 43–77)
NEUTROPHILS NFR BLD AUTO: 84.9 % — HIGH (ref 43–77)
NEUTROPHILS NFR BLD AUTO: 87.5 % — HIGH (ref 43–77)
NEUTROPHILS NFR BLD AUTO: 88 % — HIGH (ref 43–77)
NEUTROPHILS NFR BLD AUTO: 91.4 % — HIGH (ref 43–77)
NITRITE UR-MCNC: NEGATIVE — SIGNIFICANT CHANGE UP
NITRITE UR-MCNC: POSITIVE
NON HDL CHOLESTEROL: 183 MG/DL — HIGH
NORMALIZED SCT PPP-RTO: 0.76 RATIO — SIGNIFICANT CHANGE UP (ref 0–1.16)
NORMALIZED SCT PPP-RTO: 0.79 RATIO — SIGNIFICANT CHANGE UP (ref 0–1.16)
NORMALIZED SCT PPP-RTO: SIGNIFICANT CHANGE UP
NORMALIZED SCT PPP-RTO: SIGNIFICANT CHANGE UP
NRBC # BLD: 0 /100 WBCS — SIGNIFICANT CHANGE UP
NRBC # BLD: 0 /100 WBCS — SIGNIFICANT CHANGE UP (ref 0–0)
NRBC # BLD: 1 /100 WBCS — HIGH (ref 0–0)
NRBC # BLD: 7 /100 WBCS — HIGH (ref 0–0)
NRBC # FLD: 0 K/UL — SIGNIFICANT CHANGE UP
NT-PROBNP SERPL-SCNC: 37 PG/ML — SIGNIFICANT CHANGE UP (ref 0–300)
NT-PROBNP SERPL-SCNC: 41 PG/ML — SIGNIFICANT CHANGE UP (ref 0–300)
NT-PROBNP SERPL-SCNC: 448 PG/ML — HIGH (ref 0–300)
ORGANISM # SPEC MICROSCOPIC CNT: SIGNIFICANT CHANGE UP
ORGANISM # SPEC MICROSCOPIC CNT: SIGNIFICANT CHANGE UP
PAT CTL 2H: 41.8 SEC — HIGH (ref 27.5–36.5)
PCO2 BLDA: 41 MMHG — SIGNIFICANT CHANGE UP (ref 32–46)
PCO2 BLDA: 42 MMHG — SIGNIFICANT CHANGE UP (ref 32–46)
PCO2 BLDA: 43 MMHG — SIGNIFICANT CHANGE UP (ref 32–46)
PCO2 BLDA: 50 MMHG — HIGH (ref 32–46)
PH BLDA: 7.32 — LOW (ref 7.35–7.45)
PH BLDA: 7.4 — SIGNIFICANT CHANGE UP (ref 7.35–7.45)
PH BLDA: 7.42 — SIGNIFICANT CHANGE UP (ref 7.35–7.45)
PH BLDA: 7.47 — HIGH (ref 7.35–7.45)
PH UR: 6 — SIGNIFICANT CHANGE UP (ref 5–8)
PH UR: 6.5 — SIGNIFICANT CHANGE UP (ref 5–8)
PHOSPHATE SERPL-MCNC: 2.7 MG/DL — SIGNIFICANT CHANGE UP (ref 2.5–4.5)
PHOSPHATE SERPL-MCNC: 3.4 MG/DL — SIGNIFICANT CHANGE UP (ref 2.5–4.5)
PHOSPHATE SERPL-MCNC: 4.4 MG/DL — SIGNIFICANT CHANGE UP (ref 2.5–4.5)
PHOSPHATE SERPL-MCNC: 4.5 MG/DL — SIGNIFICANT CHANGE UP (ref 2.5–4.5)
PHOSPHATE SERPL-MCNC: 4.7 MG/DL — HIGH (ref 2.5–4.5)
PLATELET # BLD AUTO: 122 K/UL — LOW (ref 150–400)
PLATELET # BLD AUTO: 161 K/UL — SIGNIFICANT CHANGE UP (ref 150–400)
PLATELET # BLD AUTO: 179 K/UL — SIGNIFICANT CHANGE UP (ref 150–400)
PLATELET # BLD AUTO: 185 K/UL — SIGNIFICANT CHANGE UP (ref 150–400)
PLATELET # BLD AUTO: 188 K/UL — SIGNIFICANT CHANGE UP (ref 150–400)
PLATELET # BLD AUTO: 198 K/UL — SIGNIFICANT CHANGE UP (ref 150–400)
PLATELET # BLD AUTO: 200 K/UL — SIGNIFICANT CHANGE UP (ref 150–400)
PLATELET # BLD AUTO: 204 K/UL — SIGNIFICANT CHANGE UP (ref 150–400)
PLATELET # BLD AUTO: 213 K/UL — SIGNIFICANT CHANGE UP (ref 150–400)
PLATELET # BLD AUTO: 225 K/UL — SIGNIFICANT CHANGE UP (ref 150–400)
PLATELET # BLD AUTO: 257 K/UL — SIGNIFICANT CHANGE UP (ref 150–400)
PLATELET # BLD AUTO: 272 K/UL — SIGNIFICANT CHANGE UP (ref 150–400)
PLATELET # BLD AUTO: 295 K/UL — SIGNIFICANT CHANGE UP (ref 150–400)
PLATELET # BLD AUTO: 299 K/UL — SIGNIFICANT CHANGE UP (ref 150–400)
PLATELET # BLD AUTO: 314 K/UL — SIGNIFICANT CHANGE UP (ref 150–400)
PLATELET # BLD AUTO: 325 K/UL — SIGNIFICANT CHANGE UP (ref 150–400)
PLATELET # BLD AUTO: 64 K/UL — LOW (ref 150–400)
PLATELET # BLD AUTO: 75 K/UL — LOW (ref 150–400)
PO2 BLDA: 54 MMHG — LOW (ref 74–108)
PO2 BLDA: 82 MMHG — SIGNIFICANT CHANGE UP (ref 74–108)
PO2 BLDA: 84 MMHG — SIGNIFICANT CHANGE UP (ref 74–108)
PO2 BLDA: 98 MMHG — SIGNIFICANT CHANGE UP (ref 74–108)
POTASSIUM SERPL-MCNC: 3.6 MMOL/L — SIGNIFICANT CHANGE UP (ref 3.5–5.3)
POTASSIUM SERPL-MCNC: 4 MMOL/L — SIGNIFICANT CHANGE UP (ref 3.5–5.3)
POTASSIUM SERPL-MCNC: 4 MMOL/L — SIGNIFICANT CHANGE UP (ref 3.5–5.3)
POTASSIUM SERPL-MCNC: 4.2 MMOL/L — SIGNIFICANT CHANGE UP (ref 3.5–5.3)
POTASSIUM SERPL-MCNC: 4.4 MMOL/L — SIGNIFICANT CHANGE UP (ref 3.5–5.3)
POTASSIUM SERPL-MCNC: 4.4 MMOL/L — SIGNIFICANT CHANGE UP (ref 3.5–5.3)
POTASSIUM SERPL-MCNC: 4.5 MMOL/L — SIGNIFICANT CHANGE UP (ref 3.5–5.3)
POTASSIUM SERPL-MCNC: 4.6 MMOL/L — SIGNIFICANT CHANGE UP (ref 3.5–5.3)
POTASSIUM SERPL-MCNC: 4.6 MMOL/L — SIGNIFICANT CHANGE UP (ref 3.5–5.3)
POTASSIUM SERPL-MCNC: 4.7 MMOL/L — SIGNIFICANT CHANGE UP (ref 3.5–5.3)
POTASSIUM SERPL-MCNC: 4.9 MMOL/L — SIGNIFICANT CHANGE UP (ref 3.5–5.3)
POTASSIUM SERPL-MCNC: 5 MMOL/L — SIGNIFICANT CHANGE UP (ref 3.5–5.3)
POTASSIUM SERPL-MCNC: SIGNIFICANT CHANGE UP MMOL/L (ref 3.5–5.3)
POTASSIUM SERPL-SCNC: 3.6 MMOL/L — SIGNIFICANT CHANGE UP (ref 3.5–5.3)
POTASSIUM SERPL-SCNC: 4 MMOL/L — SIGNIFICANT CHANGE UP (ref 3.5–5.3)
POTASSIUM SERPL-SCNC: 4 MMOL/L — SIGNIFICANT CHANGE UP (ref 3.5–5.3)
POTASSIUM SERPL-SCNC: 4.2 MMOL/L — SIGNIFICANT CHANGE UP (ref 3.5–5.3)
POTASSIUM SERPL-SCNC: 4.4 MMOL/L — SIGNIFICANT CHANGE UP (ref 3.5–5.3)
POTASSIUM SERPL-SCNC: 4.4 MMOL/L — SIGNIFICANT CHANGE UP (ref 3.5–5.3)
POTASSIUM SERPL-SCNC: 4.5 MMOL/L — SIGNIFICANT CHANGE UP (ref 3.5–5.3)
POTASSIUM SERPL-SCNC: 4.6 MMOL/L — SIGNIFICANT CHANGE UP (ref 3.5–5.3)
POTASSIUM SERPL-SCNC: 4.6 MMOL/L — SIGNIFICANT CHANGE UP (ref 3.5–5.3)
POTASSIUM SERPL-SCNC: 4.7 MMOL/L — SIGNIFICANT CHANGE UP (ref 3.5–5.3)
POTASSIUM SERPL-SCNC: 4.9 MMOL/L — SIGNIFICANT CHANGE UP (ref 3.5–5.3)
POTASSIUM SERPL-SCNC: 5 MMOL/L — SIGNIFICANT CHANGE UP (ref 3.5–5.3)
POTASSIUM SERPL-SCNC: SIGNIFICANT CHANGE UP MMOL/L (ref 3.5–5.3)
PROCALCITONIN SERPL-MCNC: 0.13 NG/ML — HIGH (ref 0.02–0.1)
PROCALCITONIN SERPL-MCNC: 0.17 NG/ML — HIGH (ref 0.02–0.1)
PROCALCITONIN SERPL-MCNC: 0.2 NG/ML — HIGH (ref 0.02–0.1)
PROCALCITONIN SERPL-MCNC: 0.38 NG/ML — HIGH (ref 0.02–0.1)
PROCALCITONIN SERPL-MCNC: 0.79 NG/ML — HIGH (ref 0.02–0.1)
PROCALCITONIN SERPL-MCNC: 0.83 NG/ML — HIGH (ref 0.02–0.1)
PROCALCITONIN SERPL-MCNC: 2.84 NG/ML — HIGH (ref 0.02–0.1)
PROT SERPL-MCNC: 5.4 G/DL — LOW (ref 6–8.3)
PROT SERPL-MCNC: 5.5 G/DL — LOW (ref 6–8.3)
PROT SERPL-MCNC: 6.3 G/DL — SIGNIFICANT CHANGE UP (ref 6–8.3)
PROT SERPL-MCNC: 6.3 G/DL — SIGNIFICANT CHANGE UP (ref 6–8.3)
PROT SERPL-MCNC: 6.4 G/DL — SIGNIFICANT CHANGE UP (ref 6–8.3)
PROT SERPL-MCNC: 6.5 G/DL — SIGNIFICANT CHANGE UP (ref 6–8.3)
PROT SERPL-MCNC: 6.6 G/DL — SIGNIFICANT CHANGE UP (ref 6–8.3)
PROT SERPL-MCNC: 6.7 G/DL — SIGNIFICANT CHANGE UP (ref 6–8.3)
PROT SERPL-MCNC: 6.8 G/DL — SIGNIFICANT CHANGE UP (ref 6–8.3)
PROT SERPL-MCNC: 6.8 G/DL — SIGNIFICANT CHANGE UP (ref 6–8.3)
PROT SERPL-MCNC: 6.9 G/DL — SIGNIFICANT CHANGE UP (ref 6–8.3)
PROT SERPL-MCNC: 7 G/DL — SIGNIFICANT CHANGE UP (ref 6–8.3)
PROT SERPL-MCNC: 7 G/DL — SIGNIFICANT CHANGE UP (ref 6–8.3)
PROT SERPL-MCNC: 7.1 G/DL — SIGNIFICANT CHANGE UP (ref 6–8.3)
PROT SERPL-MCNC: SIGNIFICANT CHANGE UP G/DL (ref 6–8.3)
PROT UR-MCNC: ABNORMAL
PROT UR-MCNC: ABNORMAL
PROTHROM AB SERPL-ACNC: 11.9 SEC — SIGNIFICANT CHANGE UP (ref 10.6–13.6)
PROTHROM AB SERPL-ACNC: 13 SEC — SIGNIFICANT CHANGE UP (ref 10.6–13.6)
PROTHROM AB SERPL-ACNC: 14.3 SEC — HIGH (ref 10.6–13.6)
PROTHROM AB SERPL-ACNC: 18.8 SEC — HIGH (ref 10.6–13.6)
PROTHROM AB SERPL-ACNC: 20.4 SEC — HIGH (ref 10.6–13.6)
PROTHROM AB SERPL-ACNC: 20.9 SEC — HIGH (ref 10.6–13.6)
RAPID RVP RESULT: DETECTED
RAPID RVP RESULT: DETECTED
RAPIDTEG MAXIMUM AMPLITUDE: 67.6 MM — SIGNIFICANT CHANGE UP (ref 52–70)
RBC # BLD: 3.12 M/UL — LOW (ref 3.8–5.2)
RBC # BLD: 3.26 M/UL — LOW (ref 3.8–5.2)
RBC # BLD: 3.45 M/UL — LOW (ref 3.8–5.2)
RBC # BLD: 3.83 M/UL — SIGNIFICANT CHANGE UP (ref 3.8–5.2)
RBC # BLD: 4.31 M/UL — SIGNIFICANT CHANGE UP (ref 3.8–5.2)
RBC # BLD: 4.39 M/UL — SIGNIFICANT CHANGE UP (ref 3.8–5.2)
RBC # BLD: 4.4 M/UL — SIGNIFICANT CHANGE UP (ref 3.8–5.2)
RBC # BLD: 4.5 M/UL — SIGNIFICANT CHANGE UP (ref 3.8–5.2)
RBC # BLD: 4.81 M/UL — SIGNIFICANT CHANGE UP (ref 3.8–5.2)
RBC # BLD: 4.99 M/UL — SIGNIFICANT CHANGE UP (ref 3.8–5.2)
RBC # BLD: 5.04 M/UL — SIGNIFICANT CHANGE UP (ref 3.8–5.2)
RBC # BLD: 5.09 M/UL — SIGNIFICANT CHANGE UP (ref 3.8–5.2)
RBC # BLD: 5.16 M/UL — SIGNIFICANT CHANGE UP (ref 3.8–5.2)
RBC # BLD: 5.18 M/UL — SIGNIFICANT CHANGE UP (ref 3.8–5.2)
RBC # BLD: 5.25 M/UL — HIGH (ref 3.8–5.2)
RBC # BLD: 5.31 M/UL — HIGH (ref 3.8–5.2)
RBC # BLD: 5.42 M/UL — HIGH (ref 3.8–5.2)
RBC # BLD: 5.46 M/UL — HIGH (ref 3.8–5.2)
RBC # FLD: 14.9 % — HIGH (ref 10.3–14.5)
RBC # FLD: 15 % — HIGH (ref 10.3–14.5)
RBC # FLD: 15.2 % — HIGH (ref 10.3–14.5)
RBC # FLD: 15.2 % — HIGH (ref 10.3–14.5)
RBC # FLD: 15.3 % — HIGH (ref 10.3–14.5)
RBC # FLD: 15.3 % — HIGH (ref 10.3–14.5)
RBC # FLD: 15.4 % — HIGH (ref 10.3–14.5)
RBC # FLD: 15.4 % — HIGH (ref 10.3–14.5)
RBC # FLD: 15.5 % — HIGH (ref 10.3–14.5)
RBC # FLD: 15.7 % — HIGH (ref 10.3–14.5)
RBC # FLD: 15.8 % — HIGH (ref 10.3–14.5)
RBC # FLD: 15.9 % — HIGH (ref 10.3–14.5)
RBC # FLD: 16.1 % — HIGH (ref 10.3–14.5)
RBC CASTS # UR COMP ASSIST: 3 /HPF — SIGNIFICANT CHANGE UP (ref 0–4)
RH IG SCN BLD-IMP: POSITIVE — SIGNIFICANT CHANGE UP
RV+EV RNA SPEC QL NAA+PROBE: SIGNIFICANT CHANGE UP
S AUREUS DNA NOSE QL NAA+PROBE: SIGNIFICANT CHANGE UP
SAO2 % BLDA: 87 % — LOW (ref 92–96)
SAO2 % BLDA: 94 % — SIGNIFICANT CHANGE UP (ref 92–96)
SAO2 % BLDA: 95 % — SIGNIFICANT CHANGE UP (ref 92–96)
SAO2 % BLDA: 96 % — SIGNIFICANT CHANGE UP (ref 92–96)
SARS-COV-2 RNA SPEC QL NAA+PROBE: DETECTED
SARS-COV-2 RNA SPEC QL NAA+PROBE: DETECTED
SODIUM SERPL-SCNC: 134 MMOL/L — LOW (ref 135–145)
SODIUM SERPL-SCNC: 138 MMOL/L — SIGNIFICANT CHANGE UP (ref 135–145)
SODIUM SERPL-SCNC: 141 MMOL/L — SIGNIFICANT CHANGE UP (ref 135–145)
SODIUM SERPL-SCNC: 141 MMOL/L — SIGNIFICANT CHANGE UP (ref 135–145)
SODIUM SERPL-SCNC: 143 MMOL/L — SIGNIFICANT CHANGE UP (ref 135–145)
SODIUM SERPL-SCNC: 143 MMOL/L — SIGNIFICANT CHANGE UP (ref 135–145)
SODIUM SERPL-SCNC: 144 MMOL/L — SIGNIFICANT CHANGE UP (ref 135–145)
SODIUM SERPL-SCNC: 145 MMOL/L — SIGNIFICANT CHANGE UP (ref 135–145)
SODIUM SERPL-SCNC: 145 MMOL/L — SIGNIFICANT CHANGE UP (ref 135–145)
SODIUM SERPL-SCNC: 146 MMOL/L — HIGH (ref 135–145)
SODIUM SERPL-SCNC: 147 MMOL/L — HIGH (ref 135–145)
SODIUM SERPL-SCNC: 148 MMOL/L — HIGH (ref 135–145)
SODIUM SERPL-SCNC: 148 MMOL/L — HIGH (ref 135–145)
SODIUM SERPL-SCNC: 150 MMOL/L — HIGH (ref 135–145)
SODIUM SERPL-SCNC: 150 MMOL/L — HIGH (ref 135–145)
SP GR SPEC: 1.01 — SIGNIFICANT CHANGE UP (ref 1.01–1.02)
SP GR SPEC: 1.04 — HIGH (ref 1.01–1.02)
SPECIMEN SOURCE: SIGNIFICANT CHANGE UP
TEG FUNCTIONAL FIBRINOGEN: 31.5 MM — SIGNIFICANT CHANGE UP (ref 15–32)
TEG MAXIMUM AMPLITUDE: 67.6 MM — SIGNIFICANT CHANGE UP (ref 52–69)
TEG REACTION TIME: 7.7 MIN — SIGNIFICANT CHANGE UP (ref 4.6–9.1)
TRIGL SERPL-MCNC: 164 MG/DL — HIGH
TRIGL SERPL-MCNC: 422 MG/DL — HIGH
TRIGL SERPL-MCNC: 534 MG/DL — HIGH
TROPONIN T, HIGH SENSITIVITY RESULT: 13 NG/L — SIGNIFICANT CHANGE UP
TROPONIN T, HIGH SENSITIVITY RESULT: 13 NG/L — SIGNIFICANT CHANGE UP
UROBILINOGEN FLD QL: NEGATIVE — SIGNIFICANT CHANGE UP
UROBILINOGEN FLD QL: NEGATIVE — SIGNIFICANT CHANGE UP
WBC # BLD: 10.12 K/UL — SIGNIFICANT CHANGE UP (ref 3.8–10.5)
WBC # BLD: 10.23 K/UL — SIGNIFICANT CHANGE UP (ref 3.8–10.5)
WBC # BLD: 12.04 K/UL — HIGH (ref 3.8–10.5)
WBC # BLD: 12.76 K/UL — HIGH (ref 3.8–10.5)
WBC # BLD: 14.23 K/UL — HIGH (ref 3.8–10.5)
WBC # BLD: 16.39 K/UL — HIGH (ref 3.8–10.5)
WBC # BLD: 16.54 K/UL — HIGH (ref 3.8–10.5)
WBC # BLD: 17.01 K/UL — HIGH (ref 3.8–10.5)
WBC # BLD: 17.13 K/UL — HIGH (ref 3.8–10.5)
WBC # BLD: 18.21 K/UL — HIGH (ref 3.8–10.5)
WBC # BLD: 19.2 K/UL — HIGH (ref 3.8–10.5)
WBC # BLD: 19.55 K/UL — HIGH (ref 3.8–10.5)
WBC # BLD: 19.99 K/UL — HIGH (ref 3.8–10.5)
WBC # BLD: 25.05 K/UL — HIGH (ref 3.8–10.5)
WBC # BLD: 8.86 K/UL — SIGNIFICANT CHANGE UP (ref 3.8–10.5)
WBC # BLD: 9.68 K/UL — SIGNIFICANT CHANGE UP (ref 3.8–10.5)
WBC # BLD: 9.68 K/UL — SIGNIFICANT CHANGE UP (ref 3.8–10.5)
WBC # BLD: 9.85 K/UL — SIGNIFICANT CHANGE UP (ref 3.8–10.5)
WBC # FLD AUTO: 10.12 K/UL — SIGNIFICANT CHANGE UP (ref 3.8–10.5)
WBC # FLD AUTO: 10.23 K/UL — SIGNIFICANT CHANGE UP (ref 3.8–10.5)
WBC # FLD AUTO: 12.04 K/UL — HIGH (ref 3.8–10.5)
WBC # FLD AUTO: 12.76 K/UL — HIGH (ref 3.8–10.5)
WBC # FLD AUTO: 14.23 K/UL — HIGH (ref 3.8–10.5)
WBC # FLD AUTO: 16.39 K/UL — HIGH (ref 3.8–10.5)
WBC # FLD AUTO: 16.54 K/UL — HIGH (ref 3.8–10.5)
WBC # FLD AUTO: 17.01 K/UL — HIGH (ref 3.8–10.5)
WBC # FLD AUTO: 17.13 K/UL — HIGH (ref 3.8–10.5)
WBC # FLD AUTO: 18.21 K/UL — HIGH (ref 3.8–10.5)
WBC # FLD AUTO: 19.2 K/UL — HIGH (ref 3.8–10.5)
WBC # FLD AUTO: 19.55 K/UL — HIGH (ref 3.8–10.5)
WBC # FLD AUTO: 19.99 K/UL — HIGH (ref 3.8–10.5)
WBC # FLD AUTO: 25.05 K/UL — HIGH (ref 3.8–10.5)
WBC # FLD AUTO: 8.86 K/UL — SIGNIFICANT CHANGE UP (ref 3.8–10.5)
WBC # FLD AUTO: 9.68 K/UL — SIGNIFICANT CHANGE UP (ref 3.8–10.5)
WBC # FLD AUTO: 9.68 K/UL — SIGNIFICANT CHANGE UP (ref 3.8–10.5)
WBC # FLD AUTO: 9.85 K/UL — SIGNIFICANT CHANGE UP (ref 3.8–10.5)
WBC UR QL: 2 /HPF — SIGNIFICANT CHANGE UP (ref 0–5)

## 2021-01-01 PROCEDURE — 93970 EXTREMITY STUDY: CPT

## 2021-01-01 PROCEDURE — 99291 CRITICAL CARE FIRST HOUR: CPT

## 2021-01-01 PROCEDURE — 82728 ASSAY OF FERRITIN: CPT

## 2021-01-01 PROCEDURE — 74018 RADEX ABDOMEN 1 VIEW: CPT | Mod: 26

## 2021-01-01 PROCEDURE — 97110 THERAPEUTIC EXERCISES: CPT

## 2021-01-01 PROCEDURE — 97116 GAIT TRAINING THERAPY: CPT

## 2021-01-01 PROCEDURE — 70544 MR ANGIOGRAPHY HEAD W/O DYE: CPT | Mod: 26,59

## 2021-01-01 PROCEDURE — 93306 TTE W/DOPPLER COMPLETE: CPT | Mod: 26

## 2021-01-01 PROCEDURE — 83880 ASSAY OF NATRIURETIC PEPTIDE: CPT

## 2021-01-01 PROCEDURE — 85610 PROTHROMBIN TIME: CPT

## 2021-01-01 PROCEDURE — 86146 BETA-2 GLYCOPROTEIN ANTIBODY: CPT

## 2021-01-01 PROCEDURE — 86140 C-REACTIVE PROTEIN: CPT

## 2021-01-01 PROCEDURE — 71045 X-RAY EXAM CHEST 1 VIEW: CPT | Mod: 26

## 2021-01-01 PROCEDURE — 80053 COMPREHEN METABOLIC PANEL: CPT

## 2021-01-01 PROCEDURE — 93971 EXTREMITY STUDY: CPT | Mod: 26,RT

## 2021-01-01 PROCEDURE — 99285 EMERGENCY DEPT VISIT HI MDM: CPT | Mod: 25

## 2021-01-01 PROCEDURE — 97130 THER IVNTJ EA ADDL 15 MIN: CPT

## 2021-01-01 PROCEDURE — 84132 ASSAY OF SERUM POTASSIUM: CPT

## 2021-01-01 PROCEDURE — 87040 BLOOD CULTURE FOR BACTERIA: CPT

## 2021-01-01 PROCEDURE — 99223 1ST HOSP IP/OBS HIGH 75: CPT | Mod: GC

## 2021-01-01 PROCEDURE — 96374 THER/PROPH/DIAG INJ IV PUSH: CPT

## 2021-01-01 PROCEDURE — 99233 SBSQ HOSP IP/OBS HIGH 50: CPT

## 2021-01-01 PROCEDURE — 82533 TOTAL CORTISOL: CPT

## 2021-01-01 PROCEDURE — 87640 STAPH A DNA AMP PROBE: CPT

## 2021-01-01 PROCEDURE — 93010 ELECTROCARDIOGRAM REPORT: CPT

## 2021-01-01 PROCEDURE — 86803 HEPATITIS C AB TEST: CPT

## 2021-01-01 PROCEDURE — 80061 LIPID PANEL: CPT

## 2021-01-01 PROCEDURE — 85396 CLOTTING ASSAY WHOLE BLOOD: CPT

## 2021-01-01 PROCEDURE — 87186 SC STD MICRODIL/AGAR DIL: CPT

## 2021-01-01 PROCEDURE — 99232 SBSQ HOSP IP/OBS MODERATE 35: CPT

## 2021-01-01 PROCEDURE — 74018 RADEX ABDOMEN 1 VIEW: CPT

## 2021-01-01 PROCEDURE — 85613 RUSSELL VIPER VENOM DILUTED: CPT

## 2021-01-01 PROCEDURE — 83520 IMMUNOASSAY QUANT NOS NONAB: CPT

## 2021-01-01 PROCEDURE — 82248 BILIRUBIN DIRECT: CPT

## 2021-01-01 PROCEDURE — 71045 X-RAY EXAM CHEST 1 VIEW: CPT | Mod: 26,77

## 2021-01-01 PROCEDURE — 36600 WITHDRAWAL OF ARTERIAL BLOOD: CPT

## 2021-01-01 PROCEDURE — 71045 X-RAY EXAM CHEST 1 VIEW: CPT

## 2021-01-01 PROCEDURE — 94660 CPAP INITIATION&MGMT: CPT

## 2021-01-01 PROCEDURE — 76705 ECHO EXAM OF ABDOMEN: CPT

## 2021-01-01 PROCEDURE — 87641 MR-STAPH DNA AMP PROBE: CPT

## 2021-01-01 PROCEDURE — 83529 ASAY OF INTERLEUKIN-6 (IL-6): CPT

## 2021-01-01 PROCEDURE — 93970 EXTREMITY STUDY: CPT | Mod: 26

## 2021-01-01 PROCEDURE — 85014 HEMATOCRIT: CPT

## 2021-01-01 PROCEDURE — 82947 ASSAY GLUCOSE BLOOD QUANT: CPT

## 2021-01-01 PROCEDURE — 86850 RBC ANTIBODY SCREEN: CPT

## 2021-01-01 PROCEDURE — 93306 TTE W/DOPPLER COMPLETE: CPT

## 2021-01-01 PROCEDURE — 85598 HEXAGNAL PHOSPH PLTLT NEUTRL: CPT

## 2021-01-01 PROCEDURE — 0225U NFCT DS DNA&RNA 21 SARSCOV2: CPT

## 2021-01-01 PROCEDURE — A9585: CPT

## 2021-01-01 PROCEDURE — 83036 HEMOGLOBIN GLYCOSYLATED A1C: CPT

## 2021-01-01 PROCEDURE — 85730 THROMBOPLASTIN TIME PARTIAL: CPT

## 2021-01-01 PROCEDURE — 97162 PT EVAL MOD COMPLEX 30 MIN: CPT

## 2021-01-01 PROCEDURE — 82150 ASSAY OF AMYLASE: CPT

## 2021-01-01 PROCEDURE — 85732 THROMBOPLASTIN TIME PARTIAL: CPT

## 2021-01-01 PROCEDURE — 87070 CULTURE OTHR SPECIMN AEROBIC: CPT

## 2021-01-01 PROCEDURE — 70498 CT ANGIOGRAPHY NECK: CPT | Mod: 26

## 2021-01-01 PROCEDURE — 97166 OT EVAL MOD COMPLEX 45 MIN: CPT

## 2021-01-01 PROCEDURE — 83735 ASSAY OF MAGNESIUM: CPT

## 2021-01-01 PROCEDURE — 82803 BLOOD GASES ANY COMBINATION: CPT

## 2021-01-01 PROCEDURE — 71275 CT ANGIOGRAPHY CHEST: CPT

## 2021-01-01 PROCEDURE — 70450 CT HEAD/BRAIN W/O DYE: CPT

## 2021-01-01 PROCEDURE — 80048 BASIC METABOLIC PNL TOTAL CA: CPT

## 2021-01-01 PROCEDURE — 86900 BLOOD TYPING SEROLOGIC ABO: CPT

## 2021-01-01 PROCEDURE — 97129 THER IVNTJ 1ST 15 MIN: CPT

## 2021-01-01 PROCEDURE — 97530 THERAPEUTIC ACTIVITIES: CPT

## 2021-01-01 PROCEDURE — 93971 EXTREMITY STUDY: CPT

## 2021-01-01 PROCEDURE — 85027 COMPLETE CBC AUTOMATED: CPT

## 2021-01-01 PROCEDURE — 70551 MRI BRAIN STEM W/O DYE: CPT

## 2021-01-01 PROCEDURE — 87086 URINE CULTURE/COLONY COUNT: CPT

## 2021-01-01 PROCEDURE — 36620 INSERTION CATHETER ARTERY: CPT

## 2021-01-01 PROCEDURE — 70450 CT HEAD/BRAIN W/O DYE: CPT | Mod: 26

## 2021-01-01 PROCEDURE — 82330 ASSAY OF CALCIUM: CPT

## 2021-01-01 PROCEDURE — 85384 FIBRINOGEN ACTIVITY: CPT

## 2021-01-01 PROCEDURE — 99223 1ST HOSP IP/OBS HIGH 75: CPT

## 2021-01-01 PROCEDURE — 94003 VENT MGMT INPAT SUBQ DAY: CPT

## 2021-01-01 PROCEDURE — 70551 MRI BRAIN STEM W/O DYE: CPT | Mod: 26

## 2021-01-01 PROCEDURE — 83615 LACTATE (LD) (LDH) ENZYME: CPT

## 2021-01-01 PROCEDURE — 71275 CT ANGIOGRAPHY CHEST: CPT | Mod: 26

## 2021-01-01 PROCEDURE — 86901 BLOOD TYPING SEROLOGIC RH(D): CPT

## 2021-01-01 PROCEDURE — 86147 CARDIOLIPIN ANTIBODY EA IG: CPT

## 2021-01-01 PROCEDURE — 93005 ELECTROCARDIOGRAM TRACING: CPT

## 2021-01-01 PROCEDURE — 82306 VITAMIN D 25 HYDROXY: CPT

## 2021-01-01 PROCEDURE — 81001 URINALYSIS AUTO W/SCOPE: CPT

## 2021-01-01 PROCEDURE — 85018 HEMOGLOBIN: CPT

## 2021-01-01 PROCEDURE — 70544 MR ANGIOGRAPHY HEAD W/O DYE: CPT

## 2021-01-01 PROCEDURE — 83605 ASSAY OF LACTIC ACID: CPT

## 2021-01-01 PROCEDURE — 84100 ASSAY OF PHOSPHORUS: CPT

## 2021-01-01 PROCEDURE — 84145 PROCALCITONIN (PCT): CPT

## 2021-01-01 PROCEDURE — 99223 1ST HOSP IP/OBS HIGH 75: CPT | Mod: AI,GC

## 2021-01-01 PROCEDURE — 70496 CT ANGIOGRAPHY HEAD: CPT | Mod: 26

## 2021-01-01 PROCEDURE — 82962 GLUCOSE BLOOD TEST: CPT

## 2021-01-01 PROCEDURE — 70549 MR ANGIOGRAPH NECK W/O&W/DYE: CPT

## 2021-01-01 PROCEDURE — 97535 SELF CARE MNGMENT TRAINING: CPT

## 2021-01-01 PROCEDURE — 84478 ASSAY OF TRIGLYCERIDES: CPT

## 2021-01-01 PROCEDURE — 92526 ORAL FUNCTION THERAPY: CPT

## 2021-01-01 PROCEDURE — 85025 COMPLETE CBC W/AUTO DIFF WBC: CPT

## 2021-01-01 PROCEDURE — 94640 AIRWAY INHALATION TREATMENT: CPT

## 2021-01-01 PROCEDURE — 92610 EVALUATE SWALLOWING FUNCTION: CPT

## 2021-01-01 PROCEDURE — 70549 MR ANGIOGRAPH NECK W/O&W/DYE: CPT | Mod: 26

## 2021-01-01 PROCEDURE — 85379 FIBRIN DEGRADATION QUANT: CPT

## 2021-01-01 PROCEDURE — 76705 ECHO EXAM OF ABDOMEN: CPT | Mod: 26,RT

## 2021-01-01 PROCEDURE — 82565 ASSAY OF CREATININE: CPT

## 2021-01-01 PROCEDURE — 83690 ASSAY OF LIPASE: CPT

## 2021-01-01 PROCEDURE — 82435 ASSAY OF BLOOD CHLORIDE: CPT

## 2021-01-01 PROCEDURE — 84295 ASSAY OF SERUM SODIUM: CPT

## 2021-01-01 RX ORDER — INSULIN LISPRO 100/ML
VIAL (ML) SUBCUTANEOUS EVERY 6 HOURS
Refills: 0 | Status: DISCONTINUED | OUTPATIENT
Start: 2021-01-01 | End: 2021-01-01

## 2021-01-01 RX ORDER — ACETAMINOPHEN 500 MG
650 TABLET ORAL EVERY 4 HOURS
Refills: 0 | Status: DISCONTINUED | OUTPATIENT
Start: 2021-01-01 | End: 2021-01-01

## 2021-01-01 RX ORDER — LISINOPRIL 2.5 MG/1
1 TABLET ORAL
Qty: 0 | Refills: 0 | DISCHARGE

## 2021-01-01 RX ORDER — LINAGLIPTIN 5 MG/1
1 TABLET, FILM COATED ORAL
Qty: 0 | Refills: 0 | DISCHARGE

## 2021-01-01 RX ORDER — INSULIN LISPRO 100/ML
48 VIAL (ML) SUBCUTANEOUS
Refills: 0 | Status: DISCONTINUED | OUTPATIENT
Start: 2021-01-01 | End: 2021-01-01

## 2021-01-01 RX ORDER — INSULIN LISPRO 100/ML
VIAL (ML) SUBCUTANEOUS
Refills: 0 | Status: DISCONTINUED | OUTPATIENT
Start: 2021-01-01 | End: 2021-01-01

## 2021-01-01 RX ORDER — MULTIVIT-MIN/FERROUS GLUCONATE 9 MG/15 ML
15 LIQUID (ML) ORAL DAILY
Refills: 0 | Status: DISCONTINUED | OUTPATIENT
Start: 2021-01-01 | End: 2021-01-01

## 2021-01-01 RX ORDER — ASPIRIN/CALCIUM CARB/MAGNESIUM 324 MG
81 TABLET ORAL DAILY
Refills: 0 | Status: DISCONTINUED | OUTPATIENT
Start: 2021-01-01 | End: 2021-01-01

## 2021-01-01 RX ORDER — ATORVASTATIN CALCIUM 80 MG/1
80 TABLET, FILM COATED ORAL AT BEDTIME
Refills: 0 | Status: DISCONTINUED | OUTPATIENT
Start: 2021-01-01 | End: 2021-01-01

## 2021-01-01 RX ORDER — INSULIN GLARGINE 100 [IU]/ML
12 INJECTION, SOLUTION SUBCUTANEOUS EVERY MORNING
Refills: 0 | Status: DISCONTINUED | OUTPATIENT
Start: 2021-01-01 | End: 2021-01-01

## 2021-01-01 RX ORDER — CEFTRIAXONE 500 MG/1
1000 INJECTION, POWDER, FOR SOLUTION INTRAMUSCULAR; INTRAVENOUS EVERY 24 HOURS
Refills: 0 | Status: COMPLETED | OUTPATIENT
Start: 2021-01-01 | End: 2021-01-01

## 2021-01-01 RX ORDER — ACETAMINOPHEN 500 MG
650 TABLET ORAL ONCE
Refills: 0 | Status: COMPLETED | OUTPATIENT
Start: 2021-01-01 | End: 2021-01-01

## 2021-01-01 RX ORDER — CLOPIDOGREL BISULFATE 75 MG/1
75 TABLET, FILM COATED ORAL DAILY
Refills: 0 | Status: DISCONTINUED | OUTPATIENT
Start: 2021-01-01 | End: 2021-01-01

## 2021-01-01 RX ORDER — INSULIN LISPRO 100/ML
VIAL (ML) SUBCUTANEOUS AT BEDTIME
Refills: 0 | Status: DISCONTINUED | OUTPATIENT
Start: 2021-01-01 | End: 2021-01-01

## 2021-01-01 RX ORDER — INSULIN LISPRO 100/ML
VIAL (ML) SUBCUTANEOUS EVERY 4 HOURS
Refills: 0 | Status: DISCONTINUED | OUTPATIENT
Start: 2021-01-01 | End: 2021-01-01

## 2021-01-01 RX ORDER — INSULIN LISPRO 100/ML
44 VIAL (ML) SUBCUTANEOUS
Refills: 0 | Status: DISCONTINUED | OUTPATIENT
Start: 2021-01-01 | End: 2021-01-01

## 2021-01-01 RX ORDER — INSULIN HUMAN 100 [IU]/ML
12 INJECTION, SOLUTION SUBCUTANEOUS ONCE
Refills: 0 | Status: COMPLETED | OUTPATIENT
Start: 2021-01-01 | End: 2021-01-01

## 2021-01-01 RX ORDER — CALCIUM GLUCONATE 100 MG/ML
2 VIAL (ML) INTRAVENOUS ONCE
Refills: 0 | Status: COMPLETED | OUTPATIENT
Start: 2021-01-01 | End: 2021-01-01

## 2021-01-01 RX ORDER — INSULIN ASPART 100 [IU]/ML
13 INJECTION, SOLUTION SUBCUTANEOUS
Qty: 0 | Refills: 0 | DISCHARGE

## 2021-01-01 RX ORDER — APIXABAN 2.5 MG/1
5 TABLET, FILM COATED ORAL
Refills: 0 | Status: DISCONTINUED | OUTPATIENT
Start: 2021-01-01 | End: 2021-01-01

## 2021-01-01 RX ORDER — VANCOMYCIN HCL 1 G
1000 VIAL (EA) INTRAVENOUS ONCE
Refills: 0 | Status: COMPLETED | OUTPATIENT
Start: 2021-01-01 | End: 2021-01-01

## 2021-01-01 RX ORDER — INSULIN LISPRO 100/ML
40 VIAL (ML) SUBCUTANEOUS
Refills: 0 | Status: DISCONTINUED | OUTPATIENT
Start: 2021-01-01 | End: 2021-01-01

## 2021-01-01 RX ORDER — PIPERACILLIN AND TAZOBACTAM 4; .5 G/20ML; G/20ML
3.38 INJECTION, POWDER, LYOPHILIZED, FOR SOLUTION INTRAVENOUS ONCE
Refills: 0 | Status: DISCONTINUED | OUTPATIENT
Start: 2021-01-01 | End: 2021-01-01

## 2021-01-01 RX ORDER — ASCORBIC ACID 60 MG
500 TABLET,CHEWABLE ORAL DAILY
Refills: 0 | Status: DISCONTINUED | OUTPATIENT
Start: 2021-01-01 | End: 2021-01-01

## 2021-01-01 RX ORDER — AMLODIPINE BESYLATE 2.5 MG/1
1 TABLET ORAL
Qty: 0 | Refills: 0 | DISCHARGE

## 2021-01-01 RX ORDER — SODIUM CHLORIDE 9 MG/ML
1000 INJECTION INTRAMUSCULAR; INTRAVENOUS; SUBCUTANEOUS ONCE
Refills: 0 | Status: COMPLETED | OUTPATIENT
Start: 2021-01-01 | End: 2021-01-01

## 2021-01-01 RX ORDER — PIPERACILLIN AND TAZOBACTAM 4; .5 G/20ML; G/20ML
3.38 INJECTION, POWDER, LYOPHILIZED, FOR SOLUTION INTRAVENOUS EVERY 8 HOURS
Refills: 0 | Status: DISCONTINUED | OUTPATIENT
Start: 2021-01-01 | End: 2021-01-01

## 2021-01-01 RX ORDER — PHENYLEPHRINE HYDROCHLORIDE 10 MG/ML
0.4 INJECTION INTRAVENOUS
Qty: 160 | Refills: 0 | Status: DISCONTINUED | OUTPATIENT
Start: 2021-01-01 | End: 2021-01-01

## 2021-01-01 RX ORDER — CLOPIDOGREL BISULFATE 75 MG/1
300 TABLET, FILM COATED ORAL ONCE
Refills: 0 | Status: COMPLETED | OUTPATIENT
Start: 2021-01-01 | End: 2021-01-01

## 2021-01-01 RX ORDER — INSULIN LISPRO 100/ML
20 VIAL (ML) SUBCUTANEOUS
Refills: 0 | Status: DISCONTINUED | OUTPATIENT
Start: 2021-01-01 | End: 2021-01-01

## 2021-01-01 RX ORDER — REMDESIVIR 5 MG/ML
INJECTION INTRAVENOUS
Refills: 0 | Status: COMPLETED | OUTPATIENT
Start: 2021-01-01 | End: 2021-01-01

## 2021-01-01 RX ORDER — GLUCAGON INJECTION, SOLUTION 0.5 MG/.1ML
1 INJECTION, SOLUTION SUBCUTANEOUS ONCE
Refills: 0 | Status: DISCONTINUED | OUTPATIENT
Start: 2021-01-01 | End: 2021-01-01

## 2021-01-01 RX ORDER — INSULIN LISPRO 100/ML
42 VIAL (ML) SUBCUTANEOUS
Refills: 0 | Status: DISCONTINUED | OUTPATIENT
Start: 2021-01-01 | End: 2021-01-01

## 2021-01-01 RX ORDER — INSULIN HUMAN 100 [IU]/ML
10 INJECTION, SOLUTION SUBCUTANEOUS ONCE
Refills: 0 | Status: COMPLETED | OUTPATIENT
Start: 2021-01-01 | End: 2021-01-01

## 2021-01-01 RX ORDER — PROPOFOL 10 MG/ML
5 INJECTION, EMULSION INTRAVENOUS
Qty: 1000 | Refills: 0 | Status: DISCONTINUED | OUTPATIENT
Start: 2021-01-01 | End: 2021-01-01

## 2021-01-01 RX ORDER — INSULIN GLARGINE 100 [IU]/ML
20 INJECTION, SOLUTION SUBCUTANEOUS AT BEDTIME
Refills: 0 | Status: DISCONTINUED | OUTPATIENT
Start: 2021-01-01 | End: 2021-01-01

## 2021-01-01 RX ORDER — SIMETHICONE 80 MG/1
80 TABLET, CHEWABLE ORAL ONCE
Refills: 0 | Status: COMPLETED | OUTPATIENT
Start: 2021-01-01 | End: 2021-01-01

## 2021-01-01 RX ORDER — INSULIN LISPRO 100/ML
19 VIAL (ML) SUBCUTANEOUS
Refills: 0 | Status: DISCONTINUED | OUTPATIENT
Start: 2021-01-01 | End: 2021-01-01

## 2021-01-01 RX ORDER — ASPIRIN/CALCIUM CARB/MAGNESIUM 324 MG
1 TABLET ORAL
Qty: 0 | Refills: 0 | DISCHARGE

## 2021-01-01 RX ORDER — SODIUM CHLORIDE 9 MG/ML
1000 INJECTION, SOLUTION INTRAVENOUS
Refills: 0 | Status: DISCONTINUED | OUTPATIENT
Start: 2021-01-01 | End: 2021-01-01

## 2021-01-01 RX ORDER — CISATRACURIUM BESYLATE 2 MG/ML
10 INJECTION INTRAVENOUS ONCE
Refills: 0 | Status: COMPLETED | OUTPATIENT
Start: 2021-01-01 | End: 2021-01-01

## 2021-01-01 RX ORDER — INSULIN LISPRO 100/ML
5 VIAL (ML) SUBCUTANEOUS ONCE
Refills: 0 | Status: COMPLETED | OUTPATIENT
Start: 2021-01-01 | End: 2021-01-01

## 2021-01-01 RX ORDER — ERGOCALCIFEROL 1.25 MG/1
50000 CAPSULE ORAL ONCE
Refills: 0 | Status: COMPLETED | OUTPATIENT
Start: 2021-01-01 | End: 2021-01-01

## 2021-01-01 RX ORDER — POLYETHYLENE GLYCOL 3350 17 G/17G
17 POWDER, FOR SOLUTION ORAL DAILY
Refills: 0 | Status: DISCONTINUED | OUTPATIENT
Start: 2021-01-01 | End: 2021-01-01

## 2021-01-01 RX ORDER — CHLORHEXIDINE GLUCONATE 213 G/1000ML
15 SOLUTION TOPICAL EVERY 12 HOURS
Refills: 0 | Status: DISCONTINUED | OUTPATIENT
Start: 2021-01-01 | End: 2021-01-01

## 2021-01-01 RX ORDER — CISATRACURIUM BESYLATE 2 MG/ML
3 INJECTION INTRAVENOUS
Qty: 200 | Refills: 0 | Status: DISCONTINUED | OUTPATIENT
Start: 2021-01-01 | End: 2021-01-01

## 2021-01-01 RX ORDER — INSULIN GLARGINE 100 [IU]/ML
24 INJECTION, SOLUTION SUBCUTANEOUS
Qty: 0 | Refills: 0 | DISCHARGE

## 2021-01-01 RX ORDER — INSULIN GLARGINE 100 [IU]/ML
24 INJECTION, SOLUTION SUBCUTANEOUS AT BEDTIME
Refills: 0 | Status: DISCONTINUED | OUTPATIENT
Start: 2021-01-01 | End: 2021-01-01

## 2021-01-01 RX ORDER — INSULIN LISPRO 100/ML
35 VIAL (ML) SUBCUTANEOUS
Refills: 0 | Status: DISCONTINUED | OUTPATIENT
Start: 2021-01-01 | End: 2021-01-01

## 2021-01-01 RX ORDER — ALBUTEROL 90 UG/1
1 AEROSOL, METERED ORAL EVERY 6 HOURS
Refills: 0 | Status: DISCONTINUED | OUTPATIENT
Start: 2021-01-01 | End: 2021-01-01

## 2021-01-01 RX ORDER — VASOPRESSIN 20 [USP'U]/ML
0.04 INJECTION INTRAVENOUS
Qty: 50 | Refills: 0 | Status: DISCONTINUED | OUTPATIENT
Start: 2021-01-01 | End: 2021-01-01

## 2021-01-01 RX ORDER — CHOLECALCIFEROL (VITAMIN D3) 125 MCG
400 CAPSULE ORAL DAILY
Refills: 0 | Status: DISCONTINUED | OUTPATIENT
Start: 2021-01-01 | End: 2021-01-01

## 2021-01-01 RX ORDER — VANCOMYCIN HCL 1 G
1000 VIAL (EA) INTRAVENOUS ONCE
Refills: 0 | Status: DISCONTINUED | OUTPATIENT
Start: 2021-01-01 | End: 2021-01-01

## 2021-01-01 RX ORDER — MIDAZOLAM HYDROCHLORIDE 1 MG/ML
0.02 INJECTION, SOLUTION INTRAMUSCULAR; INTRAVENOUS
Qty: 100 | Refills: 0 | Status: DISCONTINUED | OUTPATIENT
Start: 2021-01-01 | End: 2021-01-01

## 2021-01-01 RX ORDER — DEXTROSE 50 % IN WATER 50 %
12.5 SYRINGE (ML) INTRAVENOUS ONCE
Refills: 0 | Status: DISCONTINUED | OUTPATIENT
Start: 2021-01-01 | End: 2021-01-01

## 2021-01-01 RX ORDER — NOREPINEPHRINE BITARTRATE/D5W 8 MG/250ML
0.05 PLASTIC BAG, INJECTION (ML) INTRAVENOUS
Qty: 8 | Refills: 0 | Status: DISCONTINUED | OUTPATIENT
Start: 2021-01-01 | End: 2021-01-01

## 2021-01-01 RX ORDER — INSULIN GLARGINE 100 [IU]/ML
40 INJECTION, SOLUTION SUBCUTANEOUS AT BEDTIME
Refills: 0 | Status: DISCONTINUED | OUTPATIENT
Start: 2021-01-01 | End: 2021-01-01

## 2021-01-01 RX ORDER — ACETAMINOPHEN 500 MG
1000 TABLET ORAL ONCE
Refills: 0 | Status: COMPLETED | OUTPATIENT
Start: 2021-01-01 | End: 2021-01-01

## 2021-01-01 RX ORDER — PROPOFOL 10 MG/ML
50 INJECTION, EMULSION INTRAVENOUS
Qty: 500 | Refills: 0 | Status: DISCONTINUED | OUTPATIENT
Start: 2021-01-01 | End: 2021-01-01

## 2021-01-01 RX ORDER — DEXAMETHASONE 0.5 MG/5ML
6 ELIXIR ORAL ONCE
Refills: 0 | Status: COMPLETED | OUTPATIENT
Start: 2021-01-01 | End: 2021-01-01

## 2021-01-01 RX ORDER — REMDESIVIR 5 MG/ML
100 INJECTION INTRAVENOUS EVERY 24 HOURS
Refills: 0 | Status: COMPLETED | OUTPATIENT
Start: 2021-01-01 | End: 2021-01-01

## 2021-01-01 RX ORDER — FENTANYL CITRATE 50 UG/ML
2 INJECTION INTRAVENOUS
Qty: 2500 | Refills: 0 | Status: DISCONTINUED | OUTPATIENT
Start: 2021-01-01 | End: 2021-01-01

## 2021-01-01 RX ORDER — REMDESIVIR 5 MG/ML
200 INJECTION INTRAVENOUS EVERY 24 HOURS
Refills: 0 | Status: COMPLETED | OUTPATIENT
Start: 2021-01-01 | End: 2021-01-01

## 2021-01-01 RX ORDER — PHENYLEPHRINE HYDROCHLORIDE 10 MG/ML
0.5 INJECTION INTRAVENOUS
Qty: 40 | Refills: 0 | Status: DISCONTINUED | OUTPATIENT
Start: 2021-01-01 | End: 2021-01-01

## 2021-01-01 RX ORDER — DEXTROSE 50 % IN WATER 50 %
15 SYRINGE (ML) INTRAVENOUS ONCE
Refills: 0 | Status: DISCONTINUED | OUTPATIENT
Start: 2021-01-01 | End: 2021-01-01

## 2021-01-01 RX ORDER — FENTANYL CITRATE 50 UG/ML
2 INJECTION INTRAVENOUS
Qty: 5000 | Refills: 0 | Status: DISCONTINUED | OUTPATIENT
Start: 2021-01-01 | End: 2021-01-01

## 2021-01-01 RX ORDER — DEXTROSE 50 % IN WATER 50 %
25 SYRINGE (ML) INTRAVENOUS ONCE
Refills: 0 | Status: DISCONTINUED | OUTPATIENT
Start: 2021-01-01 | End: 2021-01-01

## 2021-01-01 RX ORDER — INSULIN GLARGINE 100 [IU]/ML
44 INJECTION, SOLUTION SUBCUTANEOUS AT BEDTIME
Refills: 0 | Status: DISCONTINUED | OUTPATIENT
Start: 2021-01-01 | End: 2021-01-01

## 2021-01-01 RX ORDER — ENOXAPARIN SODIUM 100 MG/ML
100 INJECTION SUBCUTANEOUS
Refills: 0 | Status: DISCONTINUED | OUTPATIENT
Start: 2021-01-01 | End: 2021-01-01

## 2021-01-01 RX ORDER — INSULIN GLARGINE 100 [IU]/ML
26 INJECTION, SOLUTION SUBCUTANEOUS AT BEDTIME
Refills: 0 | Status: DISCONTINUED | OUTPATIENT
Start: 2021-01-01 | End: 2021-01-01

## 2021-01-01 RX ORDER — PANTOPRAZOLE SODIUM 20 MG/1
40 TABLET, DELAYED RELEASE ORAL DAILY
Refills: 0 | Status: DISCONTINUED | OUTPATIENT
Start: 2021-01-01 | End: 2021-01-01

## 2021-01-01 RX ORDER — INSULIN GLARGINE 100 [IU]/ML
34 INJECTION, SOLUTION SUBCUTANEOUS AT BEDTIME
Refills: 0 | Status: DISCONTINUED | OUTPATIENT
Start: 2021-01-01 | End: 2021-01-01

## 2021-01-01 RX ORDER — INSULIN GLARGINE 100 [IU]/ML
18 INJECTION, SOLUTION SUBCUTANEOUS AT BEDTIME
Refills: 0 | Status: COMPLETED | OUTPATIENT
Start: 2021-01-01 | End: 2021-01-01

## 2021-01-01 RX ORDER — INSULIN GLARGINE 100 [IU]/ML
36 INJECTION, SOLUTION SUBCUTANEOUS AT BEDTIME
Refills: 0 | Status: DISCONTINUED | OUTPATIENT
Start: 2021-01-01 | End: 2021-01-01

## 2021-01-01 RX ORDER — POTASSIUM CHLORIDE 20 MEQ
20 PACKET (EA) ORAL
Refills: 0 | Status: COMPLETED | OUTPATIENT
Start: 2021-01-01 | End: 2021-01-01

## 2021-01-01 RX ORDER — FUROSEMIDE 40 MG
20 TABLET ORAL ONCE
Refills: 0 | Status: COMPLETED | OUTPATIENT
Start: 2021-01-01 | End: 2021-01-01

## 2021-01-01 RX ORDER — INSULIN LISPRO 100/ML
38 VIAL (ML) SUBCUTANEOUS
Refills: 0 | Status: DISCONTINUED | OUTPATIENT
Start: 2021-01-01 | End: 2021-01-01

## 2021-01-01 RX ORDER — INSULIN GLARGINE 100 [IU]/ML
16 INJECTION, SOLUTION SUBCUTANEOUS EVERY MORNING
Refills: 0 | Status: DISCONTINUED | OUTPATIENT
Start: 2021-01-01 | End: 2021-01-01

## 2021-01-01 RX ORDER — SODIUM CHLORIDE 9 MG/ML
300 INJECTION INTRAMUSCULAR; INTRAVENOUS; SUBCUTANEOUS ONCE
Refills: 0 | Status: COMPLETED | OUTPATIENT
Start: 2021-01-01 | End: 2021-01-01

## 2021-01-01 RX ORDER — MIDAZOLAM HYDROCHLORIDE 1 MG/ML
4 INJECTION, SOLUTION INTRAMUSCULAR; INTRAVENOUS ONCE
Refills: 0 | Status: DISCONTINUED | OUTPATIENT
Start: 2021-01-01 | End: 2021-01-01

## 2021-01-01 RX ORDER — FUROSEMIDE 40 MG
40 TABLET ORAL ONCE
Refills: 0 | Status: COMPLETED | OUTPATIENT
Start: 2021-01-01 | End: 2021-01-01

## 2021-01-01 RX ORDER — INSULIN LISPRO 100/ML
25 VIAL (ML) SUBCUTANEOUS
Refills: 0 | Status: DISCONTINUED | OUTPATIENT
Start: 2021-01-01 | End: 2021-01-01

## 2021-01-01 RX ORDER — ACETAMINOPHEN 500 MG
650 TABLET ORAL EVERY 6 HOURS
Refills: 0 | Status: DISCONTINUED | OUTPATIENT
Start: 2021-01-01 | End: 2021-01-01

## 2021-01-01 RX ORDER — INSULIN HUMAN 100 [IU]/ML
5 INJECTION, SOLUTION SUBCUTANEOUS
Qty: 100 | Refills: 0 | Status: DISCONTINUED | OUTPATIENT
Start: 2021-01-01 | End: 2021-01-01

## 2021-01-01 RX ORDER — INSULIN GLARGINE 100 [IU]/ML
48 INJECTION, SOLUTION SUBCUTANEOUS AT BEDTIME
Refills: 0 | Status: DISCONTINUED | OUTPATIENT
Start: 2021-01-01 | End: 2021-01-01

## 2021-01-01 RX ORDER — SENNA PLUS 8.6 MG/1
10 TABLET ORAL AT BEDTIME
Refills: 0 | Status: DISCONTINUED | OUTPATIENT
Start: 2021-01-01 | End: 2021-01-01

## 2021-01-01 RX ORDER — HEPARIN SODIUM 5000 [USP'U]/ML
5000 INJECTION INTRAVENOUS; SUBCUTANEOUS EVERY 8 HOURS
Refills: 0 | Status: DISCONTINUED | OUTPATIENT
Start: 2021-01-01 | End: 2021-01-01

## 2021-01-01 RX ORDER — DEXTROSE 50 % IN WATER 50 %
25 SYRINGE (ML) INTRAVENOUS ONCE
Refills: 0 | Status: COMPLETED | OUTPATIENT
Start: 2021-01-01 | End: 2021-01-01

## 2021-01-01 RX ORDER — INSULIN HUMAN 100 [IU]/ML
15 INJECTION, SOLUTION SUBCUTANEOUS ONCE
Refills: 0 | Status: COMPLETED | OUTPATIENT
Start: 2021-01-01 | End: 2021-01-01

## 2021-01-01 RX ORDER — HUMAN INSULIN 100 [IU]/ML
6 INJECTION, SUSPENSION SUBCUTANEOUS EVERY 6 HOURS
Refills: 0 | Status: DISCONTINUED | OUTPATIENT
Start: 2021-01-01 | End: 2021-01-01

## 2021-01-01 RX ORDER — METFORMIN HYDROCHLORIDE 850 MG/1
1 TABLET ORAL
Qty: 0 | Refills: 0 | DISCHARGE

## 2021-01-01 RX ORDER — POLYETHYLENE GLYCOL 3350 17 G/17G
17 POWDER, FOR SOLUTION ORAL EVERY 12 HOURS
Refills: 0 | Status: DISCONTINUED | OUTPATIENT
Start: 2021-01-01 | End: 2021-01-01

## 2021-01-01 RX ORDER — PANTOPRAZOLE SODIUM 20 MG/1
40 TABLET, DELAYED RELEASE ORAL
Refills: 0 | Status: DISCONTINUED | OUTPATIENT
Start: 2021-01-01 | End: 2021-01-01

## 2021-01-01 RX ORDER — CHLORHEXIDINE GLUCONATE 213 G/1000ML
1 SOLUTION TOPICAL
Refills: 0 | Status: DISCONTINUED | OUTPATIENT
Start: 2021-01-01 | End: 2021-01-01

## 2021-01-01 RX ORDER — PIPERACILLIN AND TAZOBACTAM 4; .5 G/20ML; G/20ML
3.38 INJECTION, POWDER, LYOPHILIZED, FOR SOLUTION INTRAVENOUS ONCE
Refills: 0 | Status: COMPLETED | OUTPATIENT
Start: 2021-01-01 | End: 2021-01-01

## 2021-01-01 RX ORDER — INSULIN LISPRO 100/ML
13 VIAL (ML) SUBCUTANEOUS
Refills: 0 | Status: DISCONTINUED | OUTPATIENT
Start: 2021-01-01 | End: 2021-01-01

## 2021-01-01 RX ORDER — CEFTRIAXONE 500 MG/1
1000 INJECTION, POWDER, FOR SOLUTION INTRAMUSCULAR; INTRAVENOUS ONCE
Refills: 0 | Status: COMPLETED | OUTPATIENT
Start: 2021-01-01 | End: 2021-01-01

## 2021-01-01 RX ORDER — DEXAMETHASONE 0.5 MG/5ML
6 ELIXIR ORAL DAILY
Refills: 0 | Status: COMPLETED | OUTPATIENT
Start: 2021-01-01 | End: 2021-01-01

## 2021-01-01 RX ADMIN — ALBUTEROL 1 PUFF(S): 90 AEROSOL, METERED ORAL at 11:51

## 2021-01-01 RX ADMIN — Medication 20 MILLIGRAM(S): at 14:45

## 2021-01-01 RX ADMIN — Medication 400 UNIT(S): at 12:42

## 2021-01-01 RX ADMIN — ALBUTEROL 1 PUFF(S): 90 AEROSOL, METERED ORAL at 05:54

## 2021-01-01 RX ADMIN — POLYETHYLENE GLYCOL 3350 17 GRAM(S): 17 POWDER, FOR SOLUTION ORAL at 13:11

## 2021-01-01 RX ADMIN — Medication 8: at 21:50

## 2021-01-01 RX ADMIN — ALBUTEROL 1 PUFF(S): 90 AEROSOL, METERED ORAL at 23:13

## 2021-01-01 RX ADMIN — Medication 650 MILLIGRAM(S): at 22:15

## 2021-01-01 RX ADMIN — ENOXAPARIN SODIUM 100 MILLIGRAM(S): 100 INJECTION SUBCUTANEOUS at 05:06

## 2021-01-01 RX ADMIN — ALBUTEROL 1 PUFF(S): 90 AEROSOL, METERED ORAL at 06:05

## 2021-01-01 RX ADMIN — APIXABAN 5 MILLIGRAM(S): 2.5 TABLET, FILM COATED ORAL at 04:23

## 2021-01-01 RX ADMIN — PIPERACILLIN AND TAZOBACTAM 200 GRAM(S): 4; .5 INJECTION, POWDER, LYOPHILIZED, FOR SOLUTION INTRAVENOUS at 20:01

## 2021-01-01 RX ADMIN — POLYETHYLENE GLYCOL 3350 17 GRAM(S): 17 POWDER, FOR SOLUTION ORAL at 12:42

## 2021-01-01 RX ADMIN — Medication 650 MILLIGRAM(S): at 18:30

## 2021-01-01 RX ADMIN — POLYETHYLENE GLYCOL 3350 17 GRAM(S): 17 POWDER, FOR SOLUTION ORAL at 21:51

## 2021-01-01 RX ADMIN — Medication 6: at 08:03

## 2021-01-01 RX ADMIN — Medication 4: at 21:46

## 2021-01-01 RX ADMIN — Medication 8: at 12:42

## 2021-01-01 RX ADMIN — HEPARIN SODIUM 5000 UNIT(S): 5000 INJECTION INTRAVENOUS; SUBCUTANEOUS at 13:55

## 2021-01-01 RX ADMIN — ENOXAPARIN SODIUM 100 MILLIGRAM(S): 100 INJECTION SUBCUTANEOUS at 05:30

## 2021-01-01 RX ADMIN — INSULIN GLARGINE 20 UNIT(S): 100 INJECTION, SOLUTION SUBCUTANEOUS at 23:13

## 2021-01-01 RX ADMIN — INSULIN HUMAN 12 UNIT(S): 100 INJECTION, SOLUTION SUBCUTANEOUS at 00:16

## 2021-01-01 RX ADMIN — CHLORHEXIDINE GLUCONATE 15 MILLILITER(S): 213 SOLUTION TOPICAL at 05:17

## 2021-01-01 RX ADMIN — PIPERACILLIN AND TAZOBACTAM 25 GRAM(S): 4; .5 INJECTION, POWDER, LYOPHILIZED, FOR SOLUTION INTRAVENOUS at 22:39

## 2021-01-01 RX ADMIN — PIPERACILLIN AND TAZOBACTAM 25 GRAM(S): 4; .5 INJECTION, POWDER, LYOPHILIZED, FOR SOLUTION INTRAVENOUS at 05:01

## 2021-01-01 RX ADMIN — Medication 25 UNIT(S): at 16:49

## 2021-01-01 RX ADMIN — CLOPIDOGREL BISULFATE 75 MILLIGRAM(S): 75 TABLET, FILM COATED ORAL at 13:21

## 2021-01-01 RX ADMIN — HEPARIN SODIUM 5000 UNIT(S): 5000 INJECTION INTRAVENOUS; SUBCUTANEOUS at 06:38

## 2021-01-01 RX ADMIN — REMDESIVIR 500 MILLIGRAM(S): 5 INJECTION INTRAVENOUS at 13:13

## 2021-01-01 RX ADMIN — APIXABAN 5 MILLIGRAM(S): 2.5 TABLET, FILM COATED ORAL at 18:07

## 2021-01-01 RX ADMIN — ATORVASTATIN CALCIUM 80 MILLIGRAM(S): 80 TABLET, FILM COATED ORAL at 21:36

## 2021-01-01 RX ADMIN — ALBUTEROL 1 PUFF(S): 90 AEROSOL, METERED ORAL at 20:19

## 2021-01-01 RX ADMIN — Medication 25 UNIT(S): at 12:41

## 2021-01-01 RX ADMIN — INSULIN GLARGINE 48 UNIT(S): 100 INJECTION, SOLUTION SUBCUTANEOUS at 21:54

## 2021-01-01 RX ADMIN — Medication 16: at 10:06

## 2021-01-01 RX ADMIN — ALBUTEROL 1 PUFF(S): 90 AEROSOL, METERED ORAL at 17:47

## 2021-01-01 RX ADMIN — Medication 2: at 08:07

## 2021-01-01 RX ADMIN — Medication 6 MILLIGRAM(S): at 04:54

## 2021-01-01 RX ADMIN — Medication 48 UNIT(S): at 08:30

## 2021-01-01 RX ADMIN — ALBUTEROL 1 PUFF(S): 90 AEROSOL, METERED ORAL at 17:00

## 2021-01-01 RX ADMIN — Medication 13 UNIT(S): at 07:56

## 2021-01-01 RX ADMIN — Medication 2: at 07:56

## 2021-01-01 RX ADMIN — HEPARIN SODIUM 5000 UNIT(S): 5000 INJECTION INTRAVENOUS; SUBCUTANEOUS at 12:05

## 2021-01-01 RX ADMIN — INSULIN GLARGINE 20 UNIT(S): 100 INJECTION, SOLUTION SUBCUTANEOUS at 20:26

## 2021-01-01 RX ADMIN — POLYETHYLENE GLYCOL 3350 17 GRAM(S): 17 POWDER, FOR SOLUTION ORAL at 17:32

## 2021-01-01 RX ADMIN — APIXABAN 5 MILLIGRAM(S): 2.5 TABLET, FILM COATED ORAL at 05:17

## 2021-01-01 RX ADMIN — Medication 400 MILLIGRAM(S): at 18:45

## 2021-01-01 RX ADMIN — Medication 8: at 18:20

## 2021-01-01 RX ADMIN — ATORVASTATIN CALCIUM 80 MILLIGRAM(S): 80 TABLET, FILM COATED ORAL at 23:12

## 2021-01-01 RX ADMIN — MIDAZOLAM HYDROCHLORIDE 4 MILLIGRAM(S): 1 INJECTION, SOLUTION INTRAMUSCULAR; INTRAVENOUS at 05:00

## 2021-01-01 RX ADMIN — HEPARIN SODIUM 5000 UNIT(S): 5000 INJECTION INTRAVENOUS; SUBCUTANEOUS at 22:26

## 2021-01-01 RX ADMIN — ALBUTEROL 1 PUFF(S): 90 AEROSOL, METERED ORAL at 05:59

## 2021-01-01 RX ADMIN — ALBUTEROL 1 PUFF(S): 90 AEROSOL, METERED ORAL at 12:55

## 2021-01-01 RX ADMIN — PIPERACILLIN AND TAZOBACTAM 25 GRAM(S): 4; .5 INJECTION, POWDER, LYOPHILIZED, FOR SOLUTION INTRAVENOUS at 07:59

## 2021-01-01 RX ADMIN — Medication 4: at 21:41

## 2021-01-01 RX ADMIN — APIXABAN 5 MILLIGRAM(S): 2.5 TABLET, FILM COATED ORAL at 17:40

## 2021-01-01 RX ADMIN — APIXABAN 5 MILLIGRAM(S): 2.5 TABLET, FILM COATED ORAL at 17:47

## 2021-01-01 RX ADMIN — Medication 42 UNIT(S): at 12:35

## 2021-01-01 RX ADMIN — Medication 6 MILLIGRAM(S): at 01:45

## 2021-01-01 RX ADMIN — Medication 20 UNIT(S): at 13:35

## 2021-01-01 RX ADMIN — Medication 81 MILLIGRAM(S): at 12:05

## 2021-01-01 RX ADMIN — PANTOPRAZOLE SODIUM 40 MILLIGRAM(S): 20 TABLET, DELAYED RELEASE ORAL at 04:58

## 2021-01-01 RX ADMIN — Medication 650 MILLIGRAM(S): at 08:35

## 2021-01-01 RX ADMIN — APIXABAN 5 MILLIGRAM(S): 2.5 TABLET, FILM COATED ORAL at 17:19

## 2021-01-01 RX ADMIN — MIDAZOLAM HYDROCHLORIDE 4 MILLIGRAM(S): 1 INJECTION, SOLUTION INTRAMUSCULAR; INTRAVENOUS at 02:00

## 2021-01-01 RX ADMIN — Medication 15 MILLILITER(S): at 13:06

## 2021-01-01 RX ADMIN — ENOXAPARIN SODIUM 100 MILLIGRAM(S): 100 INJECTION SUBCUTANEOUS at 17:09

## 2021-01-01 RX ADMIN — ATORVASTATIN CALCIUM 80 MILLIGRAM(S): 80 TABLET, FILM COATED ORAL at 21:13

## 2021-01-01 RX ADMIN — Medication 2: at 08:15

## 2021-01-01 RX ADMIN — ALBUTEROL 1 PUFF(S): 90 AEROSOL, METERED ORAL at 17:19

## 2021-01-01 RX ADMIN — Medication 35 UNIT(S): at 16:56

## 2021-01-01 RX ADMIN — Medication 42 UNIT(S): at 17:06

## 2021-01-01 RX ADMIN — CHLORHEXIDINE GLUCONATE 15 MILLILITER(S): 213 SOLUTION TOPICAL at 04:52

## 2021-01-01 RX ADMIN — Medication 650 MILLIGRAM(S): at 11:59

## 2021-01-01 RX ADMIN — Medication 6: at 13:34

## 2021-01-01 RX ADMIN — ENOXAPARIN SODIUM 100 MILLIGRAM(S): 100 INJECTION SUBCUTANEOUS at 17:36

## 2021-01-01 RX ADMIN — Medication 2: at 08:12

## 2021-01-01 RX ADMIN — Medication 44 UNIT(S): at 11:50

## 2021-01-01 RX ADMIN — CEFTRIAXONE 100 MILLIGRAM(S): 500 INJECTION, POWDER, FOR SOLUTION INTRAMUSCULAR; INTRAVENOUS at 06:38

## 2021-01-01 RX ADMIN — ALBUTEROL 1 PUFF(S): 90 AEROSOL, METERED ORAL at 12:43

## 2021-01-01 RX ADMIN — Medication 10: at 12:31

## 2021-01-01 RX ADMIN — Medication 44 UNIT(S): at 17:00

## 2021-01-01 RX ADMIN — Medication 12: at 12:37

## 2021-01-01 RX ADMIN — CLOPIDOGREL BISULFATE 75 MILLIGRAM(S): 75 TABLET, FILM COATED ORAL at 11:38

## 2021-01-01 RX ADMIN — REMDESIVIR 500 MILLIGRAM(S): 5 INJECTION INTRAVENOUS at 11:38

## 2021-01-01 RX ADMIN — PIPERACILLIN AND TAZOBACTAM 25 GRAM(S): 4; .5 INJECTION, POWDER, LYOPHILIZED, FOR SOLUTION INTRAVENOUS at 13:07

## 2021-01-01 RX ADMIN — CEFTRIAXONE 100 MILLIGRAM(S): 500 INJECTION, POWDER, FOR SOLUTION INTRAMUSCULAR; INTRAVENOUS at 06:02

## 2021-01-01 RX ADMIN — INSULIN GLARGINE 44 UNIT(S): 100 INJECTION, SOLUTION SUBCUTANEOUS at 21:45

## 2021-01-01 RX ADMIN — INSULIN GLARGINE 44 UNIT(S): 100 INJECTION, SOLUTION SUBCUTANEOUS at 22:15

## 2021-01-01 RX ADMIN — Medication 2: at 16:58

## 2021-01-01 RX ADMIN — PANTOPRAZOLE SODIUM 40 MILLIGRAM(S): 20 TABLET, DELAYED RELEASE ORAL at 04:22

## 2021-01-01 RX ADMIN — Medication 12: at 16:56

## 2021-01-01 RX ADMIN — Medication 1 DROP(S): at 04:52

## 2021-01-01 RX ADMIN — Medication 1 TABLET(S): at 12:43

## 2021-01-01 RX ADMIN — Medication 4: at 12:35

## 2021-01-01 RX ADMIN — Medication 6: at 11:50

## 2021-01-01 RX ADMIN — CEFTRIAXONE 100 MILLIGRAM(S): 500 INJECTION, POWDER, FOR SOLUTION INTRAMUSCULAR; INTRAVENOUS at 22:36

## 2021-01-01 RX ADMIN — Medication 48 UNIT(S): at 16:59

## 2021-01-01 RX ADMIN — APIXABAN 5 MILLIGRAM(S): 2.5 TABLET, FILM COATED ORAL at 05:59

## 2021-01-01 RX ADMIN — Medication 6 MILLIGRAM(S): at 05:59

## 2021-01-01 RX ADMIN — ERGOCALCIFEROL 50000 UNIT(S): 1.25 CAPSULE ORAL at 21:51

## 2021-01-01 RX ADMIN — Medication 6: at 16:38

## 2021-01-01 RX ADMIN — ATORVASTATIN CALCIUM 80 MILLIGRAM(S): 80 TABLET, FILM COATED ORAL at 22:22

## 2021-01-01 RX ADMIN — SODIUM CHLORIDE 100 MILLILITER(S): 9 INJECTION, SOLUTION INTRAVENOUS at 08:37

## 2021-01-01 RX ADMIN — APIXABAN 5 MILLIGRAM(S): 2.5 TABLET, FILM COATED ORAL at 06:16

## 2021-01-01 RX ADMIN — INSULIN GLARGINE 16 UNIT(S): 100 INJECTION, SOLUTION SUBCUTANEOUS at 06:04

## 2021-01-01 RX ADMIN — POLYETHYLENE GLYCOL 3350 17 GRAM(S): 17 POWDER, FOR SOLUTION ORAL at 05:01

## 2021-01-01 RX ADMIN — Medication 250 MILLIGRAM(S): at 00:20

## 2021-01-01 RX ADMIN — Medication 650 MILLIGRAM(S): at 03:20

## 2021-01-01 RX ADMIN — Medication 6 MILLIGRAM(S): at 06:16

## 2021-01-01 RX ADMIN — Medication 500 MILLIGRAM(S): at 13:06

## 2021-01-01 RX ADMIN — Medication 20 UNIT(S): at 16:44

## 2021-01-01 RX ADMIN — ALBUTEROL 1 PUFF(S): 90 AEROSOL, METERED ORAL at 18:07

## 2021-01-01 RX ADMIN — HUMAN INSULIN 6 UNIT(S): 100 INJECTION, SUSPENSION SUBCUTANEOUS at 13:07

## 2021-01-01 RX ADMIN — ALBUTEROL 1 PUFF(S): 90 AEROSOL, METERED ORAL at 11:45

## 2021-01-01 RX ADMIN — Medication 650 MILLIGRAM(S): at 09:08

## 2021-01-01 RX ADMIN — Medication 25 GRAM(S): at 02:44

## 2021-01-01 RX ADMIN — Medication 44 UNIT(S): at 12:31

## 2021-01-01 RX ADMIN — ALBUTEROL 1 PUFF(S): 90 AEROSOL, METERED ORAL at 12:36

## 2021-01-01 RX ADMIN — CHLORHEXIDINE GLUCONATE 15 MILLILITER(S): 213 SOLUTION TOPICAL at 17:32

## 2021-01-01 RX ADMIN — CISATRACURIUM BESYLATE 10 MILLIGRAM(S): 2 INJECTION INTRAVENOUS at 20:00

## 2021-01-01 RX ADMIN — ALBUTEROL 1 PUFF(S): 90 AEROSOL, METERED ORAL at 22:18

## 2021-01-01 RX ADMIN — POLYETHYLENE GLYCOL 3350 17 GRAM(S): 17 POWDER, FOR SOLUTION ORAL at 11:51

## 2021-01-01 RX ADMIN — ALBUTEROL 1 PUFF(S): 90 AEROSOL, METERED ORAL at 01:04

## 2021-01-01 RX ADMIN — Medication 6 MILLIGRAM(S): at 04:58

## 2021-01-01 RX ADMIN — Medication 13 UNIT(S): at 16:39

## 2021-01-01 RX ADMIN — Medication 15 MILLILITER(S): at 13:08

## 2021-01-01 RX ADMIN — HUMAN INSULIN 6 UNIT(S): 100 INJECTION, SUSPENSION SUBCUTANEOUS at 10:03

## 2021-01-01 RX ADMIN — Medication 650 MILLIGRAM(S): at 04:02

## 2021-01-01 RX ADMIN — Medication 1 DROP(S): at 17:32

## 2021-01-01 RX ADMIN — Medication 40 MILLIGRAM(S): at 13:05

## 2021-01-01 RX ADMIN — INSULIN GLARGINE 34 UNIT(S): 100 INJECTION, SOLUTION SUBCUTANEOUS at 21:50

## 2021-01-01 RX ADMIN — Medication 400 UNIT(S): at 13:08

## 2021-01-01 RX ADMIN — ATORVASTATIN CALCIUM 80 MILLIGRAM(S): 80 TABLET, FILM COATED ORAL at 21:45

## 2021-01-01 RX ADMIN — HEPARIN SODIUM 5000 UNIT(S): 5000 INJECTION INTRAVENOUS; SUBCUTANEOUS at 06:03

## 2021-01-01 RX ADMIN — INSULIN GLARGINE 26 UNIT(S): 100 INJECTION, SOLUTION SUBCUTANEOUS at 22:05

## 2021-01-01 RX ADMIN — Medication 400 UNIT(S): at 13:06

## 2021-01-01 RX ADMIN — Medication 10: at 23:56

## 2021-01-01 RX ADMIN — ATORVASTATIN CALCIUM 80 MILLIGRAM(S): 80 TABLET, FILM COATED ORAL at 21:54

## 2021-01-01 RX ADMIN — Medication 5: at 16:43

## 2021-01-01 RX ADMIN — PANTOPRAZOLE SODIUM 40 MILLIGRAM(S): 20 TABLET, DELAYED RELEASE ORAL at 05:59

## 2021-01-01 RX ADMIN — PANTOPRAZOLE SODIUM 40 MILLIGRAM(S): 20 TABLET, DELAYED RELEASE ORAL at 13:06

## 2021-01-01 RX ADMIN — Medication 16: at 13:07

## 2021-01-01 RX ADMIN — Medication 4: at 12:07

## 2021-01-01 RX ADMIN — ATORVASTATIN CALCIUM 80 MILLIGRAM(S): 80 TABLET, FILM COATED ORAL at 22:38

## 2021-01-01 RX ADMIN — Medication 200 GRAM(S): at 08:35

## 2021-01-01 RX ADMIN — Medication 6: at 17:08

## 2021-01-01 RX ADMIN — POLYETHYLENE GLYCOL 3350 17 GRAM(S): 17 POWDER, FOR SOLUTION ORAL at 13:07

## 2021-01-01 RX ADMIN — Medication 6: at 16:49

## 2021-01-01 RX ADMIN — ALBUTEROL 1 PUFF(S): 90 AEROSOL, METERED ORAL at 06:16

## 2021-01-01 RX ADMIN — INSULIN GLARGINE 18 UNIT(S): 100 INJECTION, SOLUTION SUBCUTANEOUS at 23:27

## 2021-01-01 RX ADMIN — CISATRACURIUM BESYLATE 10 MILLIGRAM(S): 2 INJECTION INTRAVENOUS at 07:12

## 2021-01-01 RX ADMIN — Medication 12: at 12:30

## 2021-01-01 RX ADMIN — Medication 2: at 21:54

## 2021-01-01 RX ADMIN — Medication 100 MILLIEQUIVALENT(S): at 06:20

## 2021-01-01 RX ADMIN — POLYETHYLENE GLYCOL 3350 17 GRAM(S): 17 POWDER, FOR SOLUTION ORAL at 12:36

## 2021-01-01 RX ADMIN — INSULIN HUMAN 10 UNIT(S): 100 INJECTION, SOLUTION SUBCUTANEOUS at 19:00

## 2021-01-01 RX ADMIN — REMDESIVIR 500 MILLIGRAM(S): 5 INJECTION INTRAVENOUS at 12:01

## 2021-01-01 RX ADMIN — SIMETHICONE 80 MILLIGRAM(S): 80 TABLET, CHEWABLE ORAL at 04:24

## 2021-01-01 RX ADMIN — ENOXAPARIN SODIUM 100 MILLIGRAM(S): 100 INJECTION SUBCUTANEOUS at 04:54

## 2021-01-01 RX ADMIN — SODIUM CHLORIDE 100 MILLILITER(S): 9 INJECTION, SOLUTION INTRAVENOUS at 22:13

## 2021-01-01 RX ADMIN — Medication 44 UNIT(S): at 08:13

## 2021-01-01 RX ADMIN — Medication 16: at 04:49

## 2021-01-01 RX ADMIN — Medication 6: at 06:03

## 2021-01-01 RX ADMIN — Medication 2: at 08:29

## 2021-01-01 RX ADMIN — ATORVASTATIN CALCIUM 80 MILLIGRAM(S): 80 TABLET, FILM COATED ORAL at 22:23

## 2021-01-01 RX ADMIN — Medication 35 UNIT(S): at 12:30

## 2021-01-01 RX ADMIN — Medication 6 MILLIGRAM(S): at 05:54

## 2021-01-01 RX ADMIN — APIXABAN 5 MILLIGRAM(S): 2.5 TABLET, FILM COATED ORAL at 05:54

## 2021-01-01 RX ADMIN — Medication 1 DROP(S): at 05:17

## 2021-01-01 RX ADMIN — CHLORHEXIDINE GLUCONATE 1 APPLICATION(S): 213 SOLUTION TOPICAL at 05:16

## 2021-01-01 RX ADMIN — Medication 38 UNIT(S): at 17:09

## 2021-01-01 RX ADMIN — ATORVASTATIN CALCIUM 80 MILLIGRAM(S): 80 TABLET, FILM COATED ORAL at 21:25

## 2021-01-01 RX ADMIN — ATORVASTATIN CALCIUM 80 MILLIGRAM(S): 80 TABLET, FILM COATED ORAL at 22:05

## 2021-01-01 RX ADMIN — Medication 8: at 12:35

## 2021-01-01 RX ADMIN — Medication 6 MILLIGRAM(S): at 05:06

## 2021-01-01 RX ADMIN — ATORVASTATIN CALCIUM 80 MILLIGRAM(S): 80 TABLET, FILM COATED ORAL at 20:29

## 2021-01-01 RX ADMIN — Medication 500 MILLIGRAM(S): at 12:42

## 2021-01-01 RX ADMIN — ATORVASTATIN CALCIUM 80 MILLIGRAM(S): 80 TABLET, FILM COATED ORAL at 22:26

## 2021-01-01 RX ADMIN — Medication 8: at 23:13

## 2021-01-01 RX ADMIN — Medication 8: at 12:22

## 2021-01-01 RX ADMIN — PANTOPRAZOLE SODIUM 40 MILLIGRAM(S): 20 TABLET, DELAYED RELEASE ORAL at 06:16

## 2021-01-01 RX ADMIN — Medication 25 UNIT(S): at 08:03

## 2021-01-01 RX ADMIN — APIXABAN 5 MILLIGRAM(S): 2.5 TABLET, FILM COATED ORAL at 04:52

## 2021-01-01 RX ADMIN — INSULIN GLARGINE 40 UNIT(S): 100 INJECTION, SOLUTION SUBCUTANEOUS at 21:13

## 2021-01-01 RX ADMIN — Medication 38 UNIT(S): at 08:08

## 2021-01-01 RX ADMIN — ALBUTEROL 1 PUFF(S): 90 AEROSOL, METERED ORAL at 04:58

## 2021-01-01 RX ADMIN — HEPARIN SODIUM 5000 UNIT(S): 5000 INJECTION INTRAVENOUS; SUBCUTANEOUS at 05:59

## 2021-01-01 RX ADMIN — Medication 8: at 19:46

## 2021-01-01 RX ADMIN — PANTOPRAZOLE SODIUM 40 MILLIGRAM(S): 20 TABLET, DELAYED RELEASE ORAL at 05:54

## 2021-01-01 RX ADMIN — CLOPIDOGREL BISULFATE 300 MILLIGRAM(S): 75 TABLET, FILM COATED ORAL at 04:33

## 2021-01-01 RX ADMIN — Medication 6 MILLIGRAM(S): at 04:23

## 2021-01-01 RX ADMIN — Medication 5 UNIT(S): at 15:31

## 2021-01-01 RX ADMIN — Medication 100 MILLIEQUIVALENT(S): at 05:16

## 2021-01-01 RX ADMIN — Medication 6: at 16:59

## 2021-01-01 RX ADMIN — Medication 20 UNIT(S): at 12:07

## 2021-01-01 RX ADMIN — Medication 81 MILLIGRAM(S): at 11:38

## 2021-01-01 RX ADMIN — Medication 650 MILLIGRAM(S): at 21:36

## 2021-01-01 RX ADMIN — SODIUM CHLORIDE 1000 MILLILITER(S): 9 INJECTION INTRAMUSCULAR; INTRAVENOUS; SUBCUTANEOUS at 22:36

## 2021-01-01 RX ADMIN — PANTOPRAZOLE SODIUM 40 MILLIGRAM(S): 20 TABLET, DELAYED RELEASE ORAL at 05:06

## 2021-01-01 RX ADMIN — Medication 2: at 16:39

## 2021-01-01 RX ADMIN — ALBUTEROL 1 PUFF(S): 90 AEROSOL, METERED ORAL at 18:52

## 2021-01-01 RX ADMIN — Medication 650 MILLIGRAM(S): at 21:13

## 2021-01-01 RX ADMIN — INSULIN HUMAN 15 UNIT(S): 100 INJECTION, SOLUTION SUBCUTANEOUS at 03:11

## 2021-01-01 RX ADMIN — PIPERACILLIN AND TAZOBACTAM 25 GRAM(S): 4; .5 INJECTION, POWDER, LYOPHILIZED, FOR SOLUTION INTRAVENOUS at 23:44

## 2021-01-01 RX ADMIN — SODIUM CHLORIDE 300 MILLILITER(S): 9 INJECTION INTRAMUSCULAR; INTRAVENOUS; SUBCUTANEOUS at 09:30

## 2021-01-01 RX ADMIN — INSULIN GLARGINE 36 UNIT(S): 100 INJECTION, SOLUTION SUBCUTANEOUS at 22:22

## 2021-01-01 RX ADMIN — Medication 4: at 08:24

## 2021-01-01 RX ADMIN — ALBUTEROL 1 PUFF(S): 90 AEROSOL, METERED ORAL at 23:35

## 2021-01-01 RX ADMIN — Medication 2: at 08:30

## 2021-01-01 RX ADMIN — PANTOPRAZOLE SODIUM 40 MILLIGRAM(S): 20 TABLET, DELAYED RELEASE ORAL at 13:09

## 2021-01-01 RX ADMIN — Medication 10: at 17:06

## 2021-01-01 RX ADMIN — APIXABAN 5 MILLIGRAM(S): 2.5 TABLET, FILM COATED ORAL at 04:58

## 2021-01-01 RX ADMIN — Medication 6: at 05:57

## 2021-01-01 RX ADMIN — ALBUTEROL 1 PUFF(S): 90 AEROSOL, METERED ORAL at 13:11

## 2021-01-01 RX ADMIN — HEPARIN SODIUM 5000 UNIT(S): 5000 INJECTION INTRAVENOUS; SUBCUTANEOUS at 13:21

## 2021-01-01 RX ADMIN — POLYETHYLENE GLYCOL 3350 17 GRAM(S): 17 POWDER, FOR SOLUTION ORAL at 12:43

## 2021-01-01 RX ADMIN — Medication 20 UNIT(S): at 08:25

## 2021-01-01 RX ADMIN — MIDAZOLAM HYDROCHLORIDE 4 MILLIGRAM(S): 1 INJECTION, SOLUTION INTRAMUSCULAR; INTRAVENOUS at 06:20

## 2021-01-01 RX ADMIN — POLYETHYLENE GLYCOL 3350 17 GRAM(S): 17 POWDER, FOR SOLUTION ORAL at 12:31

## 2021-01-01 RX ADMIN — Medication 35 UNIT(S): at 08:15

## 2021-01-01 RX ADMIN — Medication 6 MILLIGRAM(S): at 05:29

## 2021-01-01 RX ADMIN — ALBUTEROL 1 PUFF(S): 90 AEROSOL, METERED ORAL at 00:01

## 2021-01-01 RX ADMIN — REMDESIVIR 500 MILLIGRAM(S): 5 INJECTION INTRAVENOUS at 12:05

## 2021-01-01 RX ADMIN — CHLORHEXIDINE GLUCONATE 1 APPLICATION(S): 213 SOLUTION TOPICAL at 05:17

## 2021-01-01 RX ADMIN — ENOXAPARIN SODIUM 100 MILLIGRAM(S): 100 INJECTION SUBCUTANEOUS at 18:30

## 2021-01-01 RX ADMIN — HEPARIN SODIUM 5000 UNIT(S): 5000 INJECTION INTRAVENOUS; SUBCUTANEOUS at 22:05

## 2021-01-01 RX ADMIN — Medication 81 MILLIGRAM(S): at 13:21

## 2021-01-01 RX ADMIN — Medication 500 MILLIGRAM(S): at 13:08

## 2021-01-01 RX ADMIN — Medication 1 DROP(S): at 19:45

## 2021-01-01 RX ADMIN — APIXABAN 5 MILLIGRAM(S): 2.5 TABLET, FILM COATED ORAL at 17:32

## 2021-01-01 RX ADMIN — Medication 10 MILLIGRAM(S): at 13:23

## 2021-01-01 RX ADMIN — Medication 650 MILLIGRAM(S): at 23:05

## 2021-01-01 RX ADMIN — Medication 30 MILLILITER(S): at 23:30

## 2021-01-01 RX ADMIN — Medication 8: at 22:16

## 2021-01-01 RX ADMIN — Medication 6: at 21:44

## 2021-01-01 RX ADMIN — INSULIN GLARGINE 24 UNIT(S): 100 INJECTION, SOLUTION SUBCUTANEOUS at 22:26

## 2021-01-01 RX ADMIN — ATORVASTATIN CALCIUM 80 MILLIGRAM(S): 80 TABLET, FILM COATED ORAL at 21:50

## 2021-01-01 RX ADMIN — Medication 6 MILLIGRAM(S): at 06:02

## 2021-01-01 RX ADMIN — SENNA PLUS 10 MILLILITER(S): 8.6 TABLET ORAL at 22:38

## 2021-01-01 RX ADMIN — REMDESIVIR 500 MILLIGRAM(S): 5 INJECTION INTRAVENOUS at 12:23

## 2021-01-30 NOTE — ED PROVIDER NOTE - PMH
Cerebrovascular accident (CVA)  2015  Chronic low back pain, unspecified back pain laterality, unspecified whether sciatica present  sciatica  Diabetes mellitus    HLD (hyperlipidemia)    HTN (hypertension)    Sciatica, unspecified laterality    Type 2 diabetes mellitus with other specified complication, unspecified whether long term insulin use

## 2021-01-30 NOTE — ED PROVIDER NOTE - ATTENDING CONTRIBUTION TO CARE
attending Madison: 70yF h/o HTN, DM, transfer from [facility] for basilar stroke. Pt presented to OSH with several days of generalized weakness, fatigue, confusion. Found to have basilar occlusion on imaging, out of TPA window. Transferred for possible IR intervention. Febrile on arrival with hypoxia requiring nasal cannula. Protecting her airway. Will place on isolation for possible covid, review OSH imaging, neuro eval in ED, admit

## 2021-01-30 NOTE — ED PROVIDER NOTE - PROGRESS NOTE DETAILS
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  Neuro consulted to see patient. O'Carroll DO PGY-1: neuro on board, recommending transfer to [facility]. Spoke to daughter, Diamond [phone], who consent to a transfer to [facility]. YASEMIN:  Handoff given to Dr. Starks at [facility] ED.  Patient to be transferred. YASEMIN:  Patient more somnolent, eyes closed when not verbally stimulated.  Upon approaching bed and providing verbal stimulation, patient opens eyes.  She is able to follow commands such as moving left arm and leg.  She is visibly tachypneic, but denies shortness of breath.  Remains dysarthric with RUE and RLE motor weakness.  Currently handling secretions and protecting airway.  Respirations 30 breaths per minute and end tidal c02 40, no hypoxia on RA.  Although worsening alertness, impression at this time is that patient does not require intubation prior to transfer to [facility].  I called transfer center and updated Dr. Starks of patient's condition. 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  Neuro stroke service consulted to see patient.

## 2021-01-30 NOTE — ED ADULT NURSE NOTE - OBJECTIVE STATEMENT
received pt in bed A and OX 3  in NAD resting comfortably c.o abd discomfort for the past 3 \days, denies NV fever chills, pt found to have dyspnea on exertions while positioning self in bed, O2 Sat at 93% room air, lung sounds clear B/l pt denies cough, fever, chills, sick contact,  abd soft non distended non tender, personal care assisted with pt placed on monitor with NSR noted, O2 NC at 2 LPM initiated, pending MD isidro at this time.

## 2021-01-30 NOTE — ED PROVIDER NOTE - PHYSICAL EXAMINATION
Attending/Elian: NAD, PERRL/EOMI, supple, no JVD, RRR, CTAB, Abd-soft, NT/ND, A&Ox3, RUE/RLE 4/5, distal/prox, no LE edema, +2 DP/PT; Skin-warm/dry

## 2021-01-30 NOTE — ED PROVIDER NOTE - CLINICAL SUMMARY MEDICAL DECISION MAKING FREE TEXT BOX
A/P 69 yo F p/w RUE/RLE weakness onset 3 days ago upon awakening, r/o CVA, r/o ACS  -EKG, labs, HCT, CXR, neuro, monitor, admit

## 2021-01-30 NOTE — ED PROVIDER NOTE - SEVERE SEPSIS ALERT DETAILS
jackie pgy3: will not do 30 cc/kg for fluid resuscitation at this time as pt has mild o2 requirement and coarse breath sounds. unknown EF.

## 2021-01-30 NOTE — ED PROVIDER NOTE - CLINICAL SUMMARY MEDICAL DECISION MAKING FREE TEXT BOX
jackie pgy3: 69 yo f pmh htn, dm, transfer from [facility] for basilar artery occlusion. arrives mild oxygen requirement, appears fatigued and mildly unwell. protecting airway. unclear baseline status. weaker in LE b/l. found to be febrile in ED. will check for infectious source, fu [facility] covid, neuro consult, tba.

## 2021-01-30 NOTE — ED PROVIDER NOTE - PROGRESS NOTE DETAILS
attending Madison: neurology at bedside. jackie pgy3: pending covid result. ua + , will treat. results dw pt/family, questions answered. jackie pgy3: Patient reassessed, NAD, non-toxic appearing. hds. covid + at [facility]. unable to go to stroke unit per neuro, endorsed to medicine.

## 2021-01-30 NOTE — ED ADULT NURSE NOTE - NSIMPLEMENTINTERV_GEN_ALL_ED
Implemented All Fall Risk Interventions:  [place] to call system. Call bell, personal items and telephone within reach. Instruct patient to call for assistance. Room bathroom lighting operational. Non-slip footwear when patient is off stretcher. Physically safe environment: no spills, clutter or unnecessary equipment. Stretcher in lowest position, wheels locked, appropriate side rails in place. Provide visual cue, wrist band, yellow gown, etc. Monitor gait and stability. Monitor for mental status changes and reorient to person, place, and time. Review medications for side effects contributing to fall risk. Reinforce activity limits and safety measures with patient and family.

## 2021-01-30 NOTE — ED ADULT NURSE REASSESSMENT NOTE - NS ED NURSE REASSESS COMMENT FT1
pt with tachypnea, communicates with 1 word  phrase, dyspnea at rest, attempted to remove IV line, lethargic but aroused to verbal stimuli.  lung sounds diminished B/L, pt I on O2 NC at 6 LPM, VS A noted, Dr Sanchez made aware of pt's status change, pending eval.

## 2021-01-30 NOTE — ED PROVIDER NOTE - PMH
Sciatica, unspecified laterality    Type 2 diabetes mellitus with other specified complication, unspecified whether long term insulin use

## 2021-01-30 NOTE — ED PROVIDER NOTE - NS ED ROS FT
GENERAL: No fever or chills, //             EYES: no change in vision, //             HEENT: no trouble swallowing or speaking, //             CARDIAC: no chest pain, //              PULMONARY: no cough or SOB, //             GI: no abdominal pain, no nausea or no vomiting, no diarrhea or constipation, //             : No changes in urination,  //            SKIN: no rashes,  //            NEURO: no headache,  //             MSK: No joint pain otherwise as HPI or negative. ~Marvin Morales DO PGY3

## 2021-01-30 NOTE — ED PROVIDER NOTE - PHYSICAL EXAMINATION
General: nad  HEENT: EOMI, PERRLA, normal mucosa, normal oropharynx, no lesions on the lips or on oral mucosa, normal external ear  Neck: supple, no lymphadenopathy, full range of motion, no nuchal rigidity  CV: tachycardic   Resp: coarse breath sounds b/l   Abd: non-distended, soft  : no CVA tenderness  MSK: full range of motion, no cyanosis, no edema, no clubbing, no immobility  Neuro: CN II-XII grossly intact, muscle strength 5/5 in ue b/l, 3/5 le b/l, silt in all extremities, aaox3  Skin: no rashes, skin intact

## 2021-01-30 NOTE — ED ADULT NURSE NOTE - OBJECTIVE STATEMENT
71 y/o female arrives to the ER via ems  complaining of weakness and slurred speech x 3 days.  Pt is A&Ox3, pt is a poor historian no able to give a reason of the present illness, she complaints of not feeling well, but can not say what is the bothering her, .   at arrival pt is tachycardic, tachypneic, and febrile. Two large bore IV's in place 2 sets of blood cultures sent to lab. VBG sent. Pt placed on continuous pulse ox and cardiac monitor, sinus tachycardia noted,  rectal temperature is 103.5, rectal Tylenol given.  noted. Will continue to monitor. On assessment airway is patent, breathing spontaneously and unlabored, skin is dry, warm and color appropriate to race. abdomen is soft, no distended, no tender,  full ROM in all extremities. Pt denies SOB, chest pain, N/V/D, urinary symptoms, fevers, chills. Comfort measures provided. call bell within reach, bed locked in the lowest position. will continue to reassess . 69 y/o female arrives to the ER via ems  complaining of weakness and slurred speech x 3 days. pmh of DM, As per EMS   Pt is A&Ox3, pt is a poor historian no able to give a reason of the present illness, she complaints of not feeling well, but can not say what is the bothering her. at arrival pt is tachycardic, tachypneic, and febrile. Two large bore IV's in place 2 sets of blood cultures sent to lab. VBG sent. Pt placed on continuous pulse ox and cardiac monitor, sinus tachycardia noted,  rectal temperature is 103.5, rectal Tylenol given.  noted. Will continue to monitor. On assessment airway is patent, breathing spontaneously and unlabored, skin is dry, warm and color appropriate to race. abdomen is soft, no distended, no tender,  full ROM in all extremities. Pt denies  chest pain, N/V/D, urinary symptoms, fevers, chills. Comfort measures provided. call bell within reach, bed locked in the lowest position. will continue to reassess . 71 y/o female arrives to the ER via ems from [facility] for neuro eval. complaining of weakness and slurred speech x 3 days. pmh of DM, As per EMS   Pt is A&Ox3, pt is a poor historian no able to give a reason of the present illness, she complaints of not feeling well, but can not say what is the bothering her. at arrival pt is tachycardic, tachypneic, and febrile. Two large bore IV's in place 2 sets of blood cultures sent to lab. VBG sent. Pt placed on continuous pulse ox and cardiac monitor, sinus tachycardia noted,  rectal temperature is 103.5, rectal Tylenol given.  noted. Will continue to monitor. On assessment airway is patent, breathing spontaneously and labored, skin is dry, warm and color appropriate to race. abdomen is soft, no distended, no tender,  full ROM in all extremities. Pt denies chest pain, N/V/D, urinary symptoms. Comfort measures provided. call bell within reach, bed locked in the lowest position. will continue to reassess . 69 y/o female arrives to the ER via ems from [facility] for neuro eval after a ct finding of a basilar blockage. Pt complaining of weakness and slurred speech x 3 days. pmh of DM, As per EMS   Pt is A&Ox3, pt is a poor historian no able to give a reason of the present illness, she complaints of not feeling well, but can not say what is the bothering her. at arrival pt is tachycardic, tachypneic, and febrile. Two large bore IV's in place 2 sets of blood cultures sent to lab. VBG sent. Pt placed on continuous pulse ox and cardiac monitor, sinus tachycardia noted,  rectal temperature is 103.5, rectal Tylenol given.  noted. Will continue to monitor. On assessment airway is patent, breathing spontaneously and labored, skin is dry, warm and color appropriate to race. abdomen is soft, no distended, no tender,  full ROM in all extremities. Pt denies chest pain, N/V/D, urinary symptoms. Comfort measures provided. call bell within reach, bed locked in the lowest position. will continue to reassess .

## 2021-01-30 NOTE — ED PROVIDER NOTE - CRITICAL CARE PROVIDED
direct patient care (not related to procedure)/additional history taking/interpretation of diagnostic studies/consultation with other physicians/telephone consultation with the patient's family direct patient care (not related to procedure)/interpretation of diagnostic studies/consultation with other physicians/telephone consultation with the patient's family

## 2021-01-30 NOTE — ED PROVIDER NOTE - NSDESTINATION_ED_A_ED
Price (Do Not Change): 0.00 Detail Level: Simple Instructions: This plan will send the code FBSE to the PM system.  DO NOT or CHANGE the price. [facility]

## 2021-01-30 NOTE — ED ADULT NURSE NOTE - ED STAT RN HANDOFF DETAILS
pt's condition unchanged from previous assessment, lethargic but aroseable to verbal stimuli,  and OX 3 follows instructions and commands, tachypnea with shallow breathing, O2 NC at 4 LPM tolerating well, report given to transfer team at bedside, [facility] EMS bus # [id number].

## 2021-01-30 NOTE — ED PROVIDER NOTE - CARE PLAN
Principal Discharge DX:	Vertebral artery occlusion  Secondary Diagnosis:	UTI (urinary tract infection)   Principal Discharge DX:	Vertebral artery occlusion  Secondary Diagnosis:	UTI (urinary tract infection)  Secondary Diagnosis:	COVID-19

## 2021-01-30 NOTE — ED ADULT TRIAGE NOTE - CHIEF COMPLAINT QUOTE
Pt arrives with multiple c/o--pt c/o severe sciatic pain that she gets injections for--last treatment was 5 days ago. Pt uses a walker for movement due to a stroke 3 yrs ago. pt c/o increasing weakness and some increasing SOB. Pt c/o lower back pain--pt diabetic

## 2021-01-30 NOTE — ED PROVIDER NOTE - OBJECTIVE STATEMENT
Attending/Elian: 71 yo F h/o DM, sciatica LBP p/w weakness and slurred speech for three days. Pt at baseline ambulates with use of cane reports ~three days of right-sided weakness and slurred speech. Pt also notes ~2 weeks of SSCP, described as press-like, non-radiating, not associated with SOB, palp, n/v.

## 2021-01-31 NOTE — H&P ADULT - PROBLEM SELECTOR PLAN 2
- T 103.5 F   - COVID +, U/A +, lactate 1.3   - S/p ceftriaxone 1 g IV, NS 1L bolus in the ED   - Will c/w ceftriaxone   - F/u CXR, blood cxs, urine cx - Baseline AOx3, ambulates with cane   - Likely multifactorial in the setting of basilar a. stenosis, sepsis, COVID   - Currently AOx2, answering questions appropriately   - Tx of contributing etiologies as described

## 2021-01-31 NOTE — PHYSICAL THERAPY INITIAL EVALUATION ADULT - MANUAL MUSCLE TESTING RESULTS, REHAB EVAL
L shoulder 3/5, bicep 3/5, L hip flexion 2+/5, knee ext 3/5, R shoulder 3-/5, bicep 3-/5, R knee ext 3-/5, R hip 2+/5, R DF/PF 0/5.

## 2021-01-31 NOTE — H&P ADULT - HISTORY OF PRESENT ILLNESS
Patient is a 70-year-old female, past medical history significant for HTN, HLD, DM2, sciatica, and history of stroke in 2015 resulting in slurred speech, difficulty walking and weakness on her right presenting as a transfer from [facility] (MR [id number]) for basilar a. stenosis. Pt presented to [facility] yesterday for 3 day history of slurred speech and R-sided weakness. At baseline, she ambulates with a cane. Pt also notes ~2 weeks of SSCP, described as press-like, non-radiating, not   associated with SOB, palp, n/v. Patient was last seen normal Thursday morning. Yesterday, patient attempted to use the bathroom, but reported having difficulty getting up and moving. Patient was also noted to have increased urinary frequency.     At [facility], initial stroke work-up was done with CT head, CTA head and neck. Noncontrast head Ct was unremarkable. 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 Patient was transferred to [facility] for evaluation of possible eventual intervention on the severe basilar stenosis. While being evaluated at [facility] patient was noted to have a UTI as well as a fever. COVID screen returned positive. Of note, family has history of frequent blood clots in multiple generations.    In the ED, pt's vitals noted to be T 103.5 F  RR 23-34 3L NC 95%. Treated with ceftriaxone 1 g IV, NS 1L bolus, decadron 6 mg IV, and Tylenol.      Patient is a 70-year-old female, past medical history significant for HTN, HLD, DM2, sciatica, and history of stroke in 2015 resulting in slurred speech, difficulty walking and weakness on her right presenting as a transfer from [facility] (MR [id number]) for basilar a. stenosis. Pt presented to [facility] yesterday for 3 day history of slurred speech and R-sided weakness. At baseline, she ambulates with a cane and is AOx3. Patient was last seen normal Thursday morning. Yesterday, patient attempted to use the bathroom, but reported having difficulty getting up and moving. Patient was also noted to have increased urinary frequency.     At [facility], initial stroke work-up was done with CT head, CTA head and neck. Noncontrast head Ct was unremarkable. 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 Patient was transferred to [facility] for evaluation of possible eventual intervention on the severe basilar stenosis. While being evaluated at [facility] patient was noted to have a UTI as well as a fever. COVID screen returned positive. Of note, family has history of frequent blood clots in multiple generations. Daughter also reports that pt has had cough for the last 2 days.     In the ED, pt's vitals noted to be T 103.5 F  RR 23-34 3L NC 95%. Treated with ceftriaxone 1 g IV, NS 1L bolus, plavix 300 mg PO, decadron 6 mg IV, and Tylenol.  Patient is a 70-year-old female, past medical history significant for HTN, HLD, DM2, sciatica, and history of stroke in 2015 resulting in slurred speech, difficulty walking and weakness on her right presenting as a transfer from [facility] (MR [id number]) for basilar a. stenosis. Pt presented to [facility] yesterday for 3 day history of slurred speech and R-sided weakness. At baseline, she ambulates with a cane and is AOx3. Patient was last seen normal Thursday morning. Yesterday, patient attempted to use the bathroom, but reported having difficulty getting up and moving. Patient was also noted to have increased urinary frequency.     At [facility], initial stroke work-up was done with CT head, CTA head and neck. Noncontrast head Ct was unremarkable. 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 Patient was transferred to [facility] for evaluation of possible eventual intervention on the severe basilar stenosis. While being evaluated at [facility] patient was noted to have a UTI as well as a fever. COVID screen returned positive. Of note, family has history of frequent blood clots in multiple generations. Daughter also reports that pt has had cough for the last 2 days.     In the ED, pt's vitals noted to be T 103.5 F  /80 RR 23-34 3L NC 95%. Treated with ceftriaxone 1 g IV, NS 1L bolus, plavix 300 mg PO, decadron 6 mg IV, and Tylenol.

## 2021-01-31 NOTE — ED POST DISCHARGE NOTE - REASON FOR FOLLOW-UP
COVID-19 positive. Patient contact # [phone] S/W patient's daughter who said aware of positive results and that mother was admitted  to hosp. Other

## 2021-01-31 NOTE — H&P ADULT - ATTENDING COMMENTS
- acute dysarthria and worsening R sided weakness with basilar A stenosis on CTA - recrudescence of old stroke symptoms vs. new stroke, serial Neuro check Q2h for initial 24 hr, MRI brain, Neuro f/u regarding the need for severe prox. basilar A stenosis  - COVID+ with fever, cough and hypoxia - will treat with steroid and Remdesivir  - UTI on Ctx  - Medication management - need to clarify all home meds in am

## 2021-01-31 NOTE — H&P ADULT - NSHPPHYSICALEXAM_GEN_ALL_CORE
Vital Signs Last 24 Hrs  T(C): 37.4 (31 Jan 2021 00:46), Max: 39.7 (30 Jan 2021 21:34)  T(F): 99.3 (31 Jan 2021 00:46), Max: 103.5 (30 Jan 2021 21:34)  HR: 100 (31 Jan 2021 02:03) (100 - 116)  BP: 136/95 (31 Jan 2021 02:03) (126/94 - 168/106)  BP(mean): --  RR: 23 (31 Jan 2021 02:03) (18 - 34)  SpO2: 95% (31 Jan 2021 02:03) (94% - 97%)    PHYSICAL EXAM:  GENERAL: NAD, on 3L NC, NAD   HEAD:  Atraumatic, Normocephalic  EYES: EOMI, PERRLA, conjunctiva and sclera clear  ENT: Dry mucous membranes  NECK: Supple, No JVD  CHEST/LUNG: Clear to auscultation bilaterally; No rales  HEART: Tachycardic, no murmurs   ABDOMEN: Bowel sounds present; Soft, Nontender, Nondistended. No hepatomegally  EXTREMITIES:  2+ Peripheral Pulses, brisk capillary refill. No clubbing, cyanosis, or edema  NERVOUS SYSTEM:  AOx2, mild dysarthria, follows commands, 4/5 strength RUE, some antigravity strength RLE, 5/5 strength LUE/LLE  MSK: FROM all 4 extremities, full and equal strength  SKIN: No rashes or lesions Vital Signs Last 24 Hrs  T(C): 37.4 (31 Jan 2021 00:46), Max: 39.7 (30 Jan 2021 21:34)  T(F): 99.3 (31 Jan 2021 00:46), Max: 103.5 (30 Jan 2021 21:34)  HR: 100 (31 Jan 2021 02:03) (100 - 116)  BP: 136/95 (31 Jan 2021 02:03) (126/94 - 168/106)  BP(mean): --  RR: 23 (31 Jan 2021 02:03) (18 - 34)  SpO2: 95% (31 Jan 2021 02:03) (94% - 97%)    PHYSICAL EXAM:  GENERAL: NAD, on 3L NC, NAD   HEAD:  Atraumatic, Normocephalic  EYES: EOMI, PERRLA, conjunctiva and sclera clear  ENT: Dry mucous membranes  NECK: Supple, No JVD  CHEST/LUNG: Clear to auscultation bilaterally; No rales  HEART: Tachycardic, no murmurs   ABDOMEN: Bowel sounds present; Soft, +suprapubic tenderness. No rebound or guarding   EXTREMITIES:  2+ Peripheral Pulses, brisk capillary refill. No edema  NERVOUS SYSTEM:  AOx2, mild dysarthria, follows commands, 4/5 strength RUE, some antigravity strength RLE, 5/5 strength LUE/LLE  MSK: no joint swelling   SKIN: No rashes or lesions Vital Signs Last 24 Hrs  T(C): 37.4 (31 Jan 2021 00:46), Max: 39.7 (30 Jan 2021 21:34)  T(F): 99.3 (31 Jan 2021 00:46), Max: 103.5 (30 Jan 2021 21:34)  HR: 100 (31 Jan 2021 02:03) (100 - 116)  BP: 136/95 (31 Jan 2021 02:03) (126/94 - 168/106)  BP(mean): --  RR: 23 (31 Jan 2021 02:03) (18 - 34)  SpO2: 95% (31 Jan 2021 02:03) (94% - 97%)    PHYSICAL EXAM:  GENERAL: NAD, on 3L NC, NAD, overweight  HEAD:  Atraumatic, Normocephalic  EYES: EOMI, conjunctiva and sclera clear  ENT: Dry mucous membranes  NECK: Supple, No JVD  CHEST/LUNG: Clear to auscultation bilaterally; No rales  HEART: Tachycardic, no murmurs   ABDOMEN: Bowel sounds present; Soft, +suprapubic tenderness. No rebound or guarding   EXTREMITIES:  2+ Peripheral Pulses, brisk capillary refill. No edema  NERVOUS SYSTEM:  AOx2 to person and hospital, mild dysarthria, follows commands, 4/5 strength RUE, some antigravity strength RLE, 5/5 strength LUE/LLE  MSK: no joint swelling   SKIN: No rashes or lesions

## 2021-01-31 NOTE — SWALLOW BEDSIDE ASSESSMENT ADULT - NS SPL SWALLOW CLINIC TRIAL FT
No throat clearing. NO COUGHING. No change in vocal quality.   Pt indicated no 'stuck sensation'   Denied backflow for possible reflux. Denied nausea. Denied pain.   Mild increase in WOB with serial sips, which improved with single sips. Pt expressed being 'very thirsty'.

## 2021-01-31 NOTE — OCCUPATIONAL THERAPY INITIAL EVALUATION ADULT - PLANNED THERAPY INTERVENTIONS, OT EVAL
ADL retraining/balance training/bed mobility training/fine motor coordination training/ROM/strengthening/transfer training

## 2021-01-31 NOTE — H&P ADULT - PROBLEM SELECTOR PLAN 4
- Diagnosed 1/30  - Will start decadron and remdesivir   - Oxygen as above  - Trend inflammatory markers - Tachypneic now on 3L NC  - Likely in the setting of COVID  - Wean oxygen as tolerated  - Monitor POX

## 2021-01-31 NOTE — SWALLOW BEDSIDE ASSESSMENT ADULT - SWALLOW EVAL: DIAGNOSIS
Pt w/ CTA head and neck demonstrated severe stenosis of the proximal basilar artery with fetal PCAs. Patient was transferred to [facility] for evaluation of possible eventual intervention on the severe basilar stenosis. While being evaluated and [facility] patient was noted to have a UTI as well as a fever. COVID screen returned positive. Oropharyngeal swallow skills p/w functional stages.

## 2021-01-31 NOTE — H&P ADULT - PROBLEM SELECTOR PLAN 8
- F/u lipid profile  - Start statin - Unclear home BP meds   - Hold BP meds, allow for permissive HTN  - Monitor BP

## 2021-01-31 NOTE — H&P ADULT - PROBLEM SELECTOR PLAN 5
- U/A positive  - C/w ceftriaxone, f/u urine culture 1.57 - Diagnosed 1/30  - Will start decadron and remdesivir   - Oxygen as above  - Monitor LFTs while on remdesivir   - Trend inflammatory markers

## 2021-01-31 NOTE — PHYSICAL THERAPY INITIAL EVALUATION ADULT - GAIT DEVIATIONS NOTED, PT EVAL
Unsteady, stabilizing B LE while weightshifting to prevent buckling/decreased stanley/decreased step length/decreased stride length/decreased weight-shifting ability

## 2021-01-31 NOTE — H&P ADULT - PROBLEM SELECTOR PLAN 3
- Tachypneic now on 3L NC  - Likely in the setting of COVID  - Wean oxygen as tolerated  - Monitor POX - T 103.5 F   - COVID +, U/A +, lactate 1.3   - S/p ceftriaxone 1 g IV, NS 1L bolus in the ED   - Will c/w ceftriaxone   - F/u CXR, blood cxs, urine cx

## 2021-01-31 NOTE — H&P ADULT - PROBLEM SELECTOR PROBLEM 2
Sepsis, due to unspecified organism, unspecified whether acute organ dysfunction present Encephalopathy

## 2021-01-31 NOTE — SWALLOW BEDSIDE ASSESSMENT ADULT - COMMENTS
Continued hx; At [facility], initial stroke work-up was done with CT head, CTA head and neck. Noncontrast head Ct was unremarkable. 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 Patient was transferred to [facility] for evaluation of possible eventual intervention on the severe basilar stenosis. While being evaluated at [facility] patient was noted to have a UTI as well as a fever. COVID screen returned positive. Of note, family has history of frequent blood clots in multiple generations. Daughter also reports that pt has had cough for the last 2 days.     In the ED, pt's vitals noted to be T 103.5 F  /80 RR 23-34 3L NC 95%. Treated with ceftriaxone 1 g IV, NS 1L bolus, plavix 300 mg PO, decadron 6 mg IV, and Tylenol.      NEUROLOGICAL: No headaches, +R weakness and dysarthria     Dysphagia history; Pt is new to this service. Pt denied modified diet prior to admission. She indicated at times things 'feel stuck'. Upon further discussion pt describing REFLUX symptoms such as 'it comes back up' and I eat 'bland food'.

## 2021-01-31 NOTE — H&P ADULT - PROBLEM SELECTOR PLAN 10
- DVT PPX: heparin SQ  - Diet: NPO, awaiting SS and decision on whether pt will get procedure  - Dispo: pending  - Medication management: daughter unclear of home meds, need to do med rec via pharmacy in the AM - DVT PPX: heparin SQ  - Diet: NPO, awaiting S/S and decision on whether pt will get procedure  - Dispo: pending  - Medication management: daughter unclear of home meds, need to do med rec via pharmacy in the AM

## 2021-01-31 NOTE — H&P ADULT - PROBLEM SELECTOR PLAN 9
- DVT PPX: heparin SQ  - Diet: NPO, awaiting SS and decision on whether pt will get procedure  - Dispo: pending - F/u lipid profile  - Start statin

## 2021-01-31 NOTE — H&P ADULT - NSHPLABSRESULTS_GEN_ALL_CORE
14.5   9.68  )-----------( 200      ( 2021 21:54 )             45.0           143  |  107  |  15  ----------------------------<  209<H>  4.2   |  21<L>  |  0.91    Ca    8.9      2021 21:54    TPro  6.8  /  Alb  3.7  /  TBili  0.2  /  DBili  x   /  AST  59<H>  /  ALT  85<H>  /  AlkPhos  75                Urinalysis Basic - ( 2021 21:54 )    Color: Light Yellow / Appearance: Clear / S.036 / pH: x  Gluc: x / Ketone: Small  / Bili: Negative / Urobili: Negative   Blood: x / Protein: 30 mg/dL / Nitrite: Positive   Leuk Esterase: Small / RBC: 3 /hpf / WBC 10 /HPF   Sq Epi: x / Non Sq Epi: 1 /hpf / Bacteria: Many        PT/INR - ( 2021 21:55 )   PT: 11.9 sec;   INR: 0.99 ratio         PTT - ( 2021 21:55 )  PTT:20.9 sec    Lactate Trend            CAPILLARY BLOOD GLUCOSE Labs, imaging and EKG tracing personally reviewed    14.5   9.68  )-----------( 200      ( 2021 21:54 )             45.0         143  |  107  |  15  ----------------------------<  209<H>  4.2   |  21<L>  |  0.91    Ca    8.9      2021 21:54    TPro  6.8  /  Alb  3.7  /  TBili  0.2  /  DBili  x   /  AST  59<H>  /  ALT  85<H>  /  AlkPhos  75            Urinalysis Basic - ( 2021 21:54 )    Color: Light Yellow / Appearance: Clear / S.036 / pH: x  Gluc: x / Ketone: Small  / Bili: Negative / Urobili: Negative   Blood: x / Protein: 30 mg/dL / Nitrite: Positive   Leuk Esterase: Small / RBC: 3 /hpf / WBC 10 /HPF   Sq Epi: x / Non Sq Epi: 1 /hpf / Bacteria: Many    PT/INR - ( 2021 21:55 )   PT: 11.9 sec;   INR: 0.99 ratio         PTT - ( 2021 21:55 )  PTT:20.9 sec    Lactate Trend    CAPILLARY BLOOD GLUCOSE

## 2021-01-31 NOTE — OCCUPATIONAL THERAPY INITIAL EVALUATION ADULT - PRECAUTIONS/LIMITATIONS, REHAB EVAL
Patient was also noted to have increased urinary frequency. At [facility], initial stroke work-up was done with CT head, CTA head and neck. Noncontrast head Ct was unremarkable. 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 Patient was transferred to [facility] for evaluation of possible eventual intervention on the severe basilar stenosis. While being evaluated at [facility] patient was noted to have a UTI as well as a fever. COVID screen returned positive. Of note, family has history of frequent blood clots in multiple generations. Daughter also reports that pt has had cough for the last 2 days. Patient was also noted to have increased urinary frequency. At [facility], initial stroke work-up was done with CT head, CTA head and neck. Noncontrast head Ct was unremarkable. 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 Patient was transferred to [facility] for evaluation of possible eventual intervention on the severe basilar stenosis. While being evaluated at [facility] patient was noted to have a UTI as well as a fever. COVID screen returned positive. Of note, family has history of frequent blood clots in multiple generations. Daughter also reports that pt has had cough for the last 2 days./fall precautions

## 2021-01-31 NOTE — H&P ADULT - NSHPSOCIALHISTORY_GEN_ALL_CORE
lives at home lives at home with boyfriend, has home attendant 5 days a week. No smoking, alcohol, recreational drug use

## 2021-01-31 NOTE — OCCUPATIONAL THERAPY INITIAL EVALUATION ADULT - DIAGNOSIS, OT EVAL
Pt p/w impaired balance, strength, AROM, endurance, coordination, motor control, cognition (decreased problem solving, impaired sequencing), safety awareness impacting ind with ADLs and fxnl mobility.

## 2021-01-31 NOTE — H&P ADULT - PROBLEM SELECTOR PROBLEM 6
Type 2 diabetes mellitus without complication, unspecified whether long term insulin use Urinary tract infection without hematuria, site unspecified

## 2021-01-31 NOTE — PHYSICAL THERAPY INITIAL EVALUATION ADULT - ADDITIONAL COMMENTS
Pt lives in apartment with elevator access with boyfriend. Pt reports independence with functional mobility with emilio-cane. Ambulates short distances within the apartment. Pt requires assistance for ADLs, has HHA 5 days a week 9am-5pm.

## 2021-01-31 NOTE — H&P ADULT - NSICDXPASTMEDICALHX_GEN_ALL_CORE_FT
PAST MEDICAL HISTORY:  Cerebrovascular accident (CVA) 2015    Chronic low back pain, unspecified back pain laterality, unspecified whether sciatica present sciatica    Diabetes mellitus     HLD (hyperlipidemia)     HTN (hypertension)

## 2021-01-31 NOTE — SWALLOW BEDSIDE ASSESSMENT ADULT - SLP PERTINENT HISTORY OF CURRENT PROBLEM
Patient is a 70-year-old female, past medical history significant for HTN, HLD, DM2, sciatica, and history of stroke in 2015 resulting in slurred speech, difficulty walking and weakness on her right presenting as a transfer from [facility] (MR [id number]) for basilar a. stenosis. Pt presented to [facility] yesterday for 3 day history of slurred speech and R-sided weakness. At baseline, she ambulates with a cane and is AOx3. Patient was last seen normal Thursday morning. Yesterday, patient attempted to use the bathroom, but reported having difficulty getting up and moving. Patient was also noted to have increased urinary frequency.

## 2021-01-31 NOTE — OCCUPATIONAL THERAPY INITIAL EVALUATION ADULT - LIVES WITH, PROFILE
Pt lives in apartment with elevator access with boyfriend. Pt reports independence with functional mobility with emilio-cane. Pt requires assistance for ADLs, has HHA 5 days a week 9am-5pm.

## 2021-01-31 NOTE — PHYSICAL THERAPY INITIAL EVALUATION ADULT - ACTIVE RANGE OF MOTION EXAMINATION, REHAB EVAL
Decreased R shoulder flexion, elbow flexion, R drop foot/Left UE Active ROM was WFL (within functional limits)/Left LE Active ROM was WFL (within functional limits)

## 2021-01-31 NOTE — PHYSICAL THERAPY INITIAL EVALUATION ADULT - PRECAUTIONS/LIMITATIONS, REHAB EVAL
fall precautions Yesterday pt reported trying to use the bathroom but reported having difficulty moving. Patient was also noted to have increased urinaryfrequency. At [facility], initial stroke work-up was done with CT head, CTA head and neck. Noncontrast head Ct was unremarkable. 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 Patient was transferred to [facility]

## 2021-01-31 NOTE — SWALLOW BEDSIDE ASSESSMENT ADULT - ORAL PHASE
Adequate mastication and oral transport, minimal reduced control of less viscous consistencies however functional

## 2021-01-31 NOTE — H&P ADULT - ASSESSMENT
Patient is a 70-year-old female, past medical history significant for HTN, HLD, DM2, sciatica, and history of stroke in 2015 resulting in slurred speech, difficulty walking and weakness on her right presenting as a transfer from [facility] (MR [id number]) for basilar a. stenosis in the setting of slurred speech and R-sided weakness. Transferred to possible endovascular intervention. Also admitted for sepsis 2/2 UTI and acute hypoxic respiratory failure 2/2 COVID.  Patient is a 70-year-old female, past medical history significant for HTN, HLD, DM2, sciatica, and history of stroke in 2015 resulting in slurred speech, difficulty walking and weakness on her right presenting as a transfer from [facility] (MR [id number]) for basilar a. stenosis in the setting of slurred speech and R-sided weakness. Transferred for possible endovascular intervention. Also admitted for sepsis 2/2 UTI and acute hypoxic respiratory failure 2/2 COVID.

## 2021-01-31 NOTE — CONSULT NOTE ADULT - ASSESSMENT
Patient is a 70-year-old right handed female, past medical history significant for HTN, HLD, DM2, 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 Patient presenting with symptoms reminiscent of 2015 stroke, and in light of the discovery of UTI and COVID positive status, symptoms may represent recrudescence of old stroke symptoms rather than new stroke. 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 In keeping with the SAMMPRIS trial recommend aggressive medical therapy as outlined below. As patient demonstrates fetal PCAs, patient may be highly dependant on flow through the basilar. As such she may benefit from endovascular intervention. Pending evaluation by interventional team tomorrow.      Recommendations  Meds    [] Aspirin 81 and Plavix 300mg loading dose then ASA81/[id number] for 3 weeks followed by ASA 81 alone  [] Atorvastatin 80, titrate to LDL<70  [] DVT prophylaxis    Imaging  [] MRI brain w/o contrast for determination of the presence of stroke   [] TTE with bubble study and telemetry to look for a cardiac source of embolism, can defer if MRI negative for stroke    Labs  [] HbA1C and Lipid Panel    Other  [] Telemonitoring;  Neurochecks and Vital signs per unit protocol  [] Permissive HTN up to 220/120 mmHg for first 24 hours,  - Given degree of basilar stenosis recommend blood pressure maintained above 140/80  [] BGM goals 140-180  [] PT/OT evaluation  [] NPO until clears Dysphagia screen, otherwise Swallow evaluation     Patient is a 70-year-old right handed female, past medical history significant for HTN, HLD, DM2, 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 Patient presenting with symptoms reminiscent of 2015 stroke, and in light of the discovery of UTI and COVID positive status, symptoms may represent recrudescence of old stroke symptoms rather than new stroke. 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 In keeping with the SAMMPRIS trial recommend aggressive medical therapy as outlined below. As patient demonstrates fetal PCAs, patient may be highly dependant on flow through the basilar. As such she may benefit from endovascular intervention. Pending evaluation by interventional team tomorrow.      Recommendations  Meds    [] Aspirin 81 and Plavix 300mg loading dose then ASA81/[id number] for 3 weeks followed by ASA 81 alone  [] Atorvastatin 80, titrate to LDL<70  [] DVT prophylaxis    Imaging  [] MRI brain w/o contrast for determination of the presence of stroke   [] TTE with bubble study and telemetry to look for a cardiac source of embolism, can defer if MRI negative for stroke    Labs  [] HbA1C and Lipid Panel  [] hypercoagulable panel as patient's family report frequent history of blood clots in multiple family members    Other  [] Telemonitoring;  Neurochecks and Vital signs per unit protocol  [] Permissive HTN up to 220/120 mmHg for first 24 hours,  - Given degree of basilar stenosis recommend blood pressure maintained above 140/80  [] BGM goals 140-180  [] PT/OT evaluation  [] NPO until clears Dysphagia screen, otherwise Swallow evaluation

## 2021-01-31 NOTE — CONSULT NOTE ADULT - SUBJECTIVE AND OBJECTIVE BOX
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 Patient was last seen normal Thursday morning. Patient was noted on the evening of  patient was noted to have slurred speech. On Friday, patient attempted to use the bathroom, but reported having difficulty getting up and moving. Patient was also noted to have increased urinary frequency. Paramedics were called on Friday and assessed the patient, did not find signs of stroke based on their assessment. Patient continued to experience difficulty walking and slurred speech prompting family to bring patient to [facility] on 2021. Patient arrived to hospital at 12:22. Initial stroke work-up was done with CT head, CTA head and neck. Noncontrast head Ct was unremarkable. 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 Patient was transferred to [facility] for evaluation of possible eventual intervention on the severe basilar stenosis. While being evaluated and [facility] patient was noted to have a UTI as well as a fever. COVID screen returned positive. Of note, family has history of frequent blood clots in multiple generations.        FAMILY HISTORY:  Social Hx:  Nonsmoker, no drug or alcohol use  Home Medications:    MEDICATIONS  (STANDING):    MEDICATIONS  (PRN):    Allergies  No Known Allergies    Intolerances      REVIEW OF SYSTEMS  ROS: Pertinent positives in HPI, all other ROS were reviewed and are negative.      Vital Signs Last 24 Hrs  T(C): 37.4 (2021 00:46), Max: 39.7 (2021 21:34)  T(F): 99.3 (2021 00:46), Max: 103.5 (2021 21:34)  HR: 100 (2021 02:03) (100 - 116)  BP: 136/95 (2021 02:03) (126/94 - 168/106)  BP(mean): --  RR: 23 (2021 02:03) (18 - 34)  SpO2: 95% (2021 02:03) (94% - 97%)    GENERAL EXAM:  Constitutional: awake and alert. NAD  HEENT: PERRLA, EOMI  Neck: Supple    NEUROLOGICAL EXAM:  Neurologic:     -Mental Status: AAOx2. aware she is in hospital but confused as to the year. Fund of common knowledge abnormal patient  Speech is somewhat stilted and mild dysarthria. Largely intact comprehension, Recent and remote memory intact. Follows commands.      -Cranial Nerves:          II:           III, IV, VI: EOMI without nystagmus. PERRL b/l          V:  Facial sensation V1-V3 equal and intact           VII: Face is symmetric with normal eye closure and smile          VIII: Patient able to hear throughout the examination          IX, X: NATHAN          XI: Head turning and shoulder shrug are intact.          XII: NATHAN     -Motor: grossly mild 4/5 decrease in strength in the right upper extremity normal appearing strength in the left upper extremity. In right lower extremity patient is seen to be able to offer some antigravity strength. With encouragement, normal appearing strength in the left lower extremity Normal bulk and tone.       -Sensation: Intact to light touch bilaterally. No neglect or extinction on double simultaneous testing.     -Coordination: No dysmetria on finger-to-nose     -Reflexes: Downgoing toes bilaterally      -Gait: NATHAN    NIHSS 12  mRS 4    Labs:   cbc                      14.5   9.68  )-----------( 200      ( 2021 21:54 )             45.0     [id number]    143  |  107  |  15  ----------------------------<  209<H>  4.2   |  21<L>  |  0.91    Ca    8.9      2021 21:54    TPro  6.8  /  Alb  3.7  /  TBili  0.2  /  DBili  x   /  AST  59<H>  /  ALT  85<H>  /  AlkPhos  75  01-30    CoagsPT/INR - ( 2021 21:55 )   PT: 11.9 sec;   INR: 0.99 ratio         PTT - ( 2021 21:55 )  PTT:20.9 sec  Lipids  A1C  CardiacMarkers    LFTsLIVER FUNCTIONS - ( 2021 21:54 )  Alb: 3.7 g/dL / Pro: 6.8 g/dL / ALK PHOS: 75 U/L / ALT: 85 U/L / AST: 59 U/L / GGT: x           UAUrinalysis Basic - ( 2021 21:54 )    Color: Light Yellow / Appearance: Clear / S.036 / pH: x  Gluc: x / Ketone: Small  / Bili: Negative / Urobili: Negative   Blood: x / Protein: 30 mg/dL / Nitrite: Positive   Leuk Esterase: Small / RBC: 3 /hpf / WBC 10 /HPF   Sq Epi: x / Non Sq Epi: 1 /hpf / Bacteria: Many      CSF  Immunological Labs    Radiology:  -CT Head - unremarkable  -CTA head and neck 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 Patient was last seen normal Thursday morning. Patient was noted on the evening of  to have slurred speech. On Friday, patient attempted to use the bathroom, but reported having difficulty getting up and moving. Patient was also noted to have increased urinary frequency. Paramedics were called on Friday and assessed the patient, did not find signs of stroke based on their assessment. Patient continued to experience difficulty walking and slurred speech prompting family to bring patient to [facility] on 2021. Patient arrived to hospital at 12:22. Initial stroke work-up was done with CT head, CTA head and neck. Noncontrast head Ct was unremarkable. 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 Patient was transferred to [facility] for evaluation of possible eventual intervention on the severe basilar stenosis. While being evaluated and [facility] patient was noted to have a UTI as well as a fever. COVID screen returned positive. Of note, family has history of frequent blood clots in multiple generations.    FAMILY HISTORY:  Social Hx:  Nonsmoker, no drug or alcohol use  Home Medications:    MEDICATIONS  (STANDING):    MEDICATIONS  (PRN):    Allergies  No Known Allergies    Intolerances      REVIEW OF SYSTEMS  ROS: Pertinent positives in HPI, all other ROS were reviewed and are negative.      Vital Signs Last 24 Hrs  T(C): 37.4 (2021 00:46), Max: 39.7 (2021 21:34)  T(F): 99.3 (2021 00:46), Max: 103.5 (2021 21:34)  HR: 100 (2021 02:03) (100 - 116)  BP: 136/95 (2021 02:03) (126/94 - 168/106)  BP(mean): --  RR: 23 (2021 02:03) (18 - 34)  SpO2: 95% (2021 02:03) (94% - 97%)    GENERAL EXAM:  Constitutional: awake and alert. NAD  HEENT: PERRLA, EOMI  Neck: Supple    NEUROLOGICAL EXAM:  Neurologic:     -Mental Status: AAOx2. aware she is in hospital but confused as to the year. Fund of common knowledge abnormal patient  Speech is somewhat stilted and mild dysarthria. Largely intact comprehension, Recent and remote memory intact. Follows commands.      -Cranial Nerves:          II:           III, IV, VI: EOMI without nystagmus. PERRL b/l          V:  Facial sensation V1-V3 equal and intact           VII: Face is symmetric with normal eye closure and smile          VIII: Patient able to hear throughout the examination          IX, X: NATHAN          XI: Head turning and shoulder shrug are intact.          XII: NATHAN     -Motor: grossly mild 4/5 decrease in strength in the right upper extremity normal appearing strength in the left upper extremity. In right lower extremity patient is seen to be able to offer some antigravity strength. With encouragement, normal appearing strength in the left lower extremity Normal bulk and tone.       -Sensation: Intact to light touch bilaterally. No neglect or extinction on double simultaneous testing.     -Coordination: No dysmetria on finger-to-nose     -Reflexes: Downgoing toes bilaterally      -Gait: NATHAN    NIHSS 12  mRS 4    Labs:   cbc                      14.5   9.68  )-----------( 200      ( 2021 21:54 )             45.0     [id number]    143  |  107  |  15  ----------------------------<  209<H>  4.2   |  21<L>  |  0.91    Ca    8.9      2021 21:54    TPro  6.8  /  Alb  3.7  /  TBili  0.2  /  DBili  x   /  AST  59<H>  /  ALT  85<H>  /  AlkPhos  75  01-30    CoagsPT/INR - ( 2021 21:55 )   PT: 11.9 sec;   INR: 0.99 ratio         PTT - ( 2021 21:55 )  PTT:20.9 sec  Lipids  A1C  CardiacMarkers    LFTsLIVER FUNCTIONS - ( 2021 21:54 )  Alb: 3.7 g/dL / Pro: 6.8 g/dL / ALK PHOS: 75 U/L / ALT: 85 U/L / AST: 59 U/L / GGT: x           UAUrinalysis Basic - ( 2021 21:54 )    Color: Light Yellow / Appearance: Clear / S.036 / pH: x  Gluc: x / Ketone: Small  / Bili: Negative / Urobili: Negative   Blood: x / Protein: 30 mg/dL / Nitrite: Positive   Leuk Esterase: Small / RBC: 3 /hpf / WBC 10 /HPF   Sq Epi: x / Non Sq Epi: 1 /hpf / Bacteria: Many      CSF  Immunological Labs    Radiology:  -CT Head - unremarkable  -CTA head and neck 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 Patient was last seen normal Thursday morning. Patient was noted on the evening of  to have slurred speech. On Friday, patient attempted to use the bathroom, but reported having difficulty getting up and moving. Patient was also noted to have increased urinary frequency. Paramedics were called on Friday and assessed the patient, did not find signs of stroke based on their assessment. Patient continued to experience difficulty walking and slurred speech prompting family to bring patient to [facility] on 2021. Patient arrived to hospital at 12:22. Initial stroke work-up was done with CT head, CTA head and neck. Noncontrast head Ct was unremarkable. 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 Patient was transferred to [facility] for evaluation of possible eventual intervention on the severe basilar stenosis. While being evaluated and [facility] patient was noted to have a UTI as well as a fever. COVID screen returned positive. Of note, family has history of frequent blood clots in multiple generations.    FAMILY HISTORY:  Social Hx:  Nonsmoker, no drug or alcohol use  Home Medications:    MEDICATIONS  (STANDING):    MEDICATIONS  (PRN):    Allergies  No Known Allergies    Intolerances      REVIEW OF SYSTEMS  ROS: Pertinent positives in HPI, all other ROS were reviewed and are negative.      Vital Signs Last 24 Hrs  T(C): 37.4 (2021 00:46), Max: 39.7 (2021 21:34)  T(F): 99.3 (2021 00:46), Max: 103.5 (2021 21:34)  HR: 100 (2021 02:03) (100 - 116)  BP: 136/95 (2021 02:03) (126/94 - 168/106)  BP(mean): --  RR: 23 (2021 02:03) (18 - 34)  SpO2: 95% (2021 02:03) (94% - 97%)    GENERAL EXAM:  Constitutional: awake and alert. NAD  HEENT: PERRLA, EOMI  Neck: Supple    NEUROLOGICAL EXAM:  Neurologic:     -Mental Status: AAOx2. aware she is in hospital but confused as to the year. Fund of common knowledge abnormal patient  Speech is somewhat stilted and mild dysarthria. Largely intact comprehension, Recent and remote memory intact. Follows commands.      -Cranial Nerves:          II, III, IV, VI: EOMI without nystagmus. PERRL b/l          V:  Facial sensation V1-V3 equal and intact           VII: Face is symmetric with normal eye closure and smile          VIII: Patient able to hear throughout the examination          IX, X: NATHAN          XI: Head turning and shoulder shrug are intact.          XII: NATHAN     -Motor: grossly mild 4/5 decrease in strength in the right upper extremity normal appearing strength in the left upper extremity. In right lower extremity patient is seen to be able to offer some antigravity strength. With encouragement, normal appearing strength in the left lower extremity Normal bulk and tone.       -Sensation: Intact to light touch bilaterally. No neglect or extinction on double simultaneous testing.     -Coordination: No dysmetria on finger-to-nose     -Reflexes: Downgoing toes bilaterally      -Gait: NATHAN    NIHSS 12  mRS 4    Labs:   cbc                      14.5   9.68  )-----------( 200      ( 2021 21:54 )             45.0     [id number]    143  |  107  |  15  ----------------------------<  209<H>  4.2   |  21<L>  |  0.91    Ca    8.9      2021 21:54    TPro  6.8  /  Alb  3.7  /  TBili  0.2  /  DBili  x   /  AST  59<H>  /  ALT  85<H>  /  AlkPhos  75  01-30    CoagsPT/INR - ( 2021 21:55 )   PT: 11.9 sec;   INR: 0.99 ratio         PTT - ( 2021 21:55 )  PTT:20.9 sec  Lipids  A1C  CardiacMarkers    LFTsLIVER FUNCTIONS - ( 2021 21:54 )  Alb: 3.7 g/dL / Pro: 6.8 g/dL / ALK PHOS: 75 U/L / ALT: 85 U/L / AST: 59 U/L / GGT: x           UAUrinalysis Basic - ( 2021 21:54 )    Color: Light Yellow / Appearance: Clear / S.036 / pH: x  Gluc: x / Ketone: Small  / Bili: Negative / Urobili: Negative   Blood: x / Protein: 30 mg/dL / Nitrite: Positive   Leuk Esterase: Small / RBC: 3 /hpf / WBC 10 /HPF   Sq Epi: x / Non Sq Epi: 1 /hpf / Bacteria: Many      CSF  Immunological Labs    Radiology:  -CT Head - unremarkable  -CTA head and neck 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

## 2021-01-31 NOTE — OCCUPATIONAL THERAPY INITIAL EVALUATION ADULT - PERTINENT HX OF CURRENT PROBLEM, REHAB EVAL
70F PMH significant for CVA 2015 resulting in slurred speech, difficulty walking and weakness on her right presenting as a transfer from [facility] for basilar a. stenosis. Pt presented to [facility] yesterday for 3 day history of slurred speech and R-sided weakness. At baseline, she ambulates with a cane and is AOx3. Patient was last seen normal Thursday morning. Yesterday, patient attempted to use the bathroom, but reported having difficulty getting up and moving.

## 2021-01-31 NOTE — H&P ADULT - PROBLEM SELECTOR PLAN 7
Ears: no ear pain and no hearing problems. - Hold BP meds, allow for permissive HTN  - Monitor BP - On insulin at home, unclear doses, need to clarify meds   - F/u A1c  - Will start weight based lantus 12 U, ISS while NPO   - Trend fingersticks - On insulin at home, unclear doses, need to clarify meds   - F/u A1c  - Will start weight based lantus 16 U, moderate ISS while NPO   - Trend fingersticks  - Will likely need to up titrate given will also be on steroids - On insulin at home, unclear doses, need to clarify meds   - F/u A1c  - Will start weight based lantus 16 U, low dose ISS while NPO   - Trend fingersticks  - Will likely need to up titrate given will also be on steroids

## 2021-01-31 NOTE — H&P ADULT - PROBLEM SELECTOR PLAN 6
- F/u A1c  - ISS while NPO   - Trend fingersticks - U/A positive  - C/w ceftriaxone, f/u urine culture

## 2021-01-31 NOTE — SWALLOW BEDSIDE ASSESSMENT ADULT - SWALLOW EVAL: RECOMMENDED DIET
Regular solids/ Thin liquids, will follow up x1 to ensure tolerance of diet, given current mentation at time of evaluation.

## 2021-01-31 NOTE — CHART NOTE - NSCHARTNOTEFT_GEN_A_CORE
Patient seen and examined  Patient's daughter on phone during interview, questions answered  tolerating well on 3LNC, remdesivir and dexamethasone  f/u neuro re CVA - permissive htn, f/u mri, on dapt. holding bp meds  f/u S+S, PT/OT    ceftriaxone for uti    anticipate steroid induced hyperglycemia - resume home insulin doses, titrate to goal

## 2021-01-31 NOTE — H&P ADULT - NSHPREVIEWOFSYSTEMS_GEN_ALL_CORE
REVIEW OF SYSTEMS:  CONSTITUTIONAL: No fever, chills, night sweats, or fatigue  EYES: No eye pain, visual disturbances, or discharge  ENT:  No difficulty hearing, tinnitus, vertigo; No sinus or throat pain  NECK: No pain or stiffness  RESPIRATORY: No cough, wheezing, or hemoptysis; No shortness of breath  CARDIOVASCULAR: No chest pain, palpitations, dizziness, or leg swelling  GASTROINTESTINAL: No abdominal or epigastric pain. No nausea, vomiting, or hematemesis; No diarrhea or constipation. No melena or hematochezia.  GENITOURINARY: No dysuria, frequency, hematuria, or incontinence  NEUROLOGICAL: No headaches, memory loss, loss of strength, numbness, or tremors  SKIN: No itching, burning, rashes, or lesions   LYMPH NODES: No enlarged glands  ENDOCRINE: No heat or cold intolerance; No hair loss  MUSCULOSKELETAL: No joint pain or swelling; No muscle, back, or extremity pain  PSYCHIATRIC: No anxiety, no depression  HEME/LYMPH: No easy bruising, or bleeding gums  ALLERY AND IMMUNOLOGIC: No hives or eczema REVIEW OF SYSTEMS:  CONSTITUTIONAL: No fever, chills  EYES: No eye pain  ENT:  No difficulty hearing, tinnitus, vertigo  NECK: No pain   RESPIRATORY: +cough   CARDIOVASCULAR: No chest pain, palpitations, dizziness, or leg swelling  GASTROINTESTINAL: No abdominal or epigastric pain. No nausea, vomiting, or hematemesis; No diarrhea or constipation. No melena or hematochezia.  GENITOURINARY: +urinary frequency   NEUROLOGICAL: No headaches, +R weakness and dysarthria   SKIN: No rashes   MUSCULOSKELETAL: No joint pain or swelling  PSYCHIATRIC: No anxiety  HEME/LYMPH: No easy bruising, or bleeding gums

## 2021-01-31 NOTE — H&P ADULT - PROBLEM SELECTOR PROBLEM 3
Acute respiratory failure with hypoxia Sepsis, due to unspecified organism, unspecified whether acute organ dysfunction present

## 2021-01-31 NOTE — H&P ADULT - PROBLEM SELECTOR PLAN 1
- Hx of stroke in 2015 resulting in slurred speech, difficulty walking weakness on her right presenting with basilar stenosis with symptoms reminiscent of 2015 stroke (slurred speech and R sided weakness)  - Neurology consulted, may represent recrudescence of old stroke symptoms rather than new stroke  - CTA severe stenosis of the proximal basilar artery  - May benefit from endovascular intervention. Pending evaluation by interventional team tomorrow  - Aspirin 81 and Plavix 300mg loading dose then [id number]/[id number] for 3 weeks followed by ASA 81 alone, Atorvastatin 80, titrate to LDL<70, DVT prophylaxis  - MRI brain w/o contrast for determination of the presence of stroke   - TTE with bubble study and telemetry to look for a cardiac source of embolism, can defer if MRI negative for stroke  - F/u A1c, lipid panel   - Monitor on telemetry  - Neuro checks  - Permissive HTN up to 220/120 mmHg for first 24 hours  - Given degree of basilar stenosis recommend blood pressure maintained above 140/80  - PT/OT eval   - NPO until clears Dysphagia screen, otherwise Swallow evaluation - Hx of stroke in 2015 resulting in slurred speech, difficulty walking weakness on her right presenting with basilar stenosis with symptoms reminiscent of 2015 stroke (slurred speech and R sided weakness)  - Neurology consulted, may represent recrudescence of old stroke symptoms rather than new stroke  - CTA severe stenosis of the proximal basilar artery  - May benefit from endovascular intervention. Pending evaluation by interventional team tomorrow  - Aspirin 81 and Plavix 300mg loading dose then [id number]/[id number] for 3 weeks followed by ASA 81 alone, Atorvastatin 80, titrate to LDL<70, DVT prophylaxis  - MRI brain w/o contrast for determination of the presence of stroke   - TTE with bubble study and telemetry to look for a cardiac source of embolism, can defer if MRI negative for stroke  - F/u A1c, lipid panel   - Monitor on telemetry  - Neuro checks q4 on the floor, spoke to Neurology resident regarding q2 neuro checks -- Neuro will come in between the floor neuro checks to do additional neuro checks   - Permissive HTN up to 220/120 mmHg for first 24 hours  - Given degree of basilar stenosis recommend blood pressure maintained above 140/80  - PT/OT eval   - NPO until clears Dysphagia screen, otherwise Swallow evaluation

## 2021-01-31 NOTE — SWALLOW BEDSIDE ASSESSMENT ADULT - ORAL PREPARATORY PHASE
able to complete labial seal for cup drinking, able to strip the bolus from the tsp effectively, able to bite through a solid with ease/Within functional limits

## 2021-02-01 NOTE — DISCHARGE NOTE NURSING/CASE MANAGEMENT/SOCIAL WORK - PATIENT PORTAL LINK FT
You can access the FollowMyHealth Patient Portal offered by [facility] by registering at the following website: [email or web address]. By joining [email or web address] FollowMyHealth portal, you will also be able to view your health information using other applications (apps) compatible with our system.

## 2021-02-01 NOTE — PROGRESS NOTE ADULT - PROBLEM SELECTOR PLAN 2
- Baseline AOx3, ambulates with cane   - Likely multifactorial in the setting of basilar a. stenosis, sepsis, COVID   - Currently AOx2, answering questions appropriately   - Tx of contributing etiologies as described - Baseline AOx3, ambulates with cane   - Likely multifactorial in the setting of basilar a. stenosis, sepsis, COVID   - Answering questions appropriately

## 2021-02-01 NOTE — PROGRESS NOTE ADULT - SUBJECTIVE AND OBJECTIVE BOX
Neurology Progress Note    S: Patient seen and examined in covid unit on airborn and contact isolation. patient denied CP, SOB, HA or pain. in bed on NC    Medication:  aspirin  chewable 81 milliGRAM(s) Oral daily  atorvastatin 80 milliGRAM(s) Oral at bedtime  clopidogrel Tablet 75 milliGRAM(s) Oral daily  dexAMETHasone  Injectable 6 milliGRAM(s) IV Push daily  dextrose 40% Gel 15 Gram(s) Oral once  dextrose 5%. 1000 milliLiter(s) IV Continuous <Continuous>  dextrose 5%. 1000 milliLiter(s) IV Continuous <Continuous>  dextrose 50% Injectable 25 Gram(s) IV Push once  dextrose 50% Injectable 12.5 Gram(s) IV Push once  dextrose 50% Injectable 25 Gram(s) IV Push once  glucagon  Injectable 1 milliGRAM(s) IntraMuscular once  heparin   Injectable 5000 Unit(s) SubCutaneous every 8 hours  insulin glargine Injectable (LANTUS) 24 Unit(s) SubCutaneous at bedtime  insulin lispro (ADMELOG) corrective regimen sliding scale   SubCutaneous at bedtime  insulin lispro (ADMELOG) corrective regimen sliding scale   SubCutaneous three times a day before meals  insulin lispro Injectable (ADMELOG) 13 Unit(s) SubCutaneous three times a day before meals  remdesivir  IVPB 100 milliGRAM(s) IV Intermittent every 24 hours  remdesivir  IVPB   IV Intermittent       Vitals:  Vital Signs Last 24 Hrs  T(C): 37.8 (2021 04:54), Max: 37.8 (2021 04:54)  T(F): 100 (2021 04:54), Max: 100 (2021 04:54)  HR: 93 (2021 04:54) (80 - 93)  BP: 152/93 (2021 04:54) (122/80 - 152/93)  BP(mean): --  RR: 19 (2021 04:54) (19 - 20)  SpO2: 93% (2021 04:54) (92% - 95%)    General Exam:   General Appearance: Appropriately dressed and in no acute distress       Head: Normocephalic, atraumatic and no dysmorphic features  Ear, Nose, and Throat: Moist mucous membranes  CVS: S1S2+  Resp: No SOB, no wheeze or rhonchi  Abd: soft NTND  Extremities: No edema, no cyanosis  Skin: No bruises, no rashes     Neurological Exam:  Mental Status: Awake, alert and oriented x 2-3.  Able to follow simple and complex verbal commands. Able to name and repeat. fluent speech. No obvious aphasia or dysarthria noted.   Cranial Nerves: PERRL, EOMI, VFFC, sensation V1-V3 intact,  mild R facial asymmetry , equal elevation of palate, scm/trap 5/5, tongue is midline on protrusion. no obvious papilledema on fundoscopic exam. Hearing is grossly intact.   Motor: RUE and RLE 4/5, LUE/LLE 5/5   Sensation: Intact to light touch and pinprick throughout.   Reflexes: 1+ throughout at biceps, brachioradialis, triceps, patellars and ankles bilaterally and equal. No clonus. R toe and L toe were both downgoing.  Coordination: No dysmetria on FNF   Gait: deferred     I personally reviewed the below data/images/labs:      CBC Full  -  ( 2021 05:31 )  WBC Count : 10.12 K/uL  RBC Count : 4.99 M/uL  Hemoglobin : 14.3 g/dL  Hematocrit : 44.4 %  Platelet Count - Automated : 198 K/uL  Mean Cell Volume : 89.0 fl  Mean Cell Hemoglobin : 28.7 pg  Mean Cell Hemoglobin Concentration : 32.2 gm/dL  Auto Neutrophil # : 8.09 K/uL  Auto Lymphocyte # : 1.47 K/uL  Auto Monocyte # : 0.52 K/uL  Auto Eosinophil # : 0.00 K/uL  Auto Basophil # : 0.01 K/uL  Auto Neutrophil % : 80.0 %  Auto Lymphocyte % : 14.5 %  Auto Monocyte % : 5.1 %  Auto Eosinophil % : 0.0 %  Auto Basophil % : 0.1 %        141  |  106  |  23  ----------------------------<  242<H>  4.2   |  25  |  1.03    Ca    8.5      2021 05:31  Phos  3.4       Mg     2.2         TPro  6.8  /  Alb  3.6  /  TBili  0.2  /  DBili  x   /  AST  45<H>  /  ALT  66<H>  /  AlkPhos  65      LIVER FUNCTIONS - ( 2021 05:31 )  Alb: 3.6 g/dL / Pro: 6.8 g/dL / ALK PHOS: 65 U/L / ALT: 66 U/L / AST: 45 U/L / GGT: x           PT/INR - ( 2021 06:19 )   PT: 13.0 sec;   INR: 1.09 ratio         PTT - ( 2021 06:19 )  PTT:25.4 sec  Urinalysis Basic - ( 2021 21:54 )    Color: Light Yellow / Appearance: Clear / S.036 / pH: x  Gluc: x / Ketone: Small  / Bili: Negative / Urobili: Negative   Blood: x / Protein: 30 mg/dL / Nitrite: Positive   Leuk Esterase: Small / RBC: 3 /hpf / WBC 10 /HPF   Sq Epi: x / Non Sq Epi: 1 /hpf / Bacteria: Many    Radiology:  -CT Head - unremarkable  -CTA head and neck 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

## 2021-02-01 NOTE — CHART NOTE - NSCHARTNOTEFT_GEN_A_CORE
Due to the inclement weather phone recs given and full consult to be done tomorrow.  Pt with poorly controlled BS as she is on her home regimen.   Adjust insulin to Lantus 26 units sq qhs and Admelog 20 units sq tid-ac.  Discussed with CHEMA Goodwin.  Indu Pretty MD  Endocrinology Attending  Mondays and Tuesdays 9am-6pm: [phone] (pager)  Other days, night and weekend: [phone]

## 2021-02-01 NOTE — PROGRESS NOTE ADULT - PROBLEM SELECTOR PLAN 8
- Unclear home BP meds   - Hold BP meds, allow for permissive HTN  - Monitor BP - lipid profile with high   - Started lipitor 80 mg daily

## 2021-02-01 NOTE — PROGRESS NOTE ADULT - PROBLEM SELECTOR PLAN 7
- On insulin at home, unclear doses, need to clarify meds   - F/u A1c  - Will start weight based lantus 16 U, low dose ISS while NPO   - Trend fingersticks  - Will likely need to up titrate given will also be on steroids - home meds norvasc and lisinopril being held due to permissive HTN  - Monitor BP

## 2021-02-01 NOTE — PROGRESS NOTE ADULT - SUBJECTIVE AND OBJECTIVE BOX
[facility] Division of Hospital Medicine  Yovanny Almonte MD, ZELALEM  Pager (M-F, 7Y-1K): [phone]  Other Times:  [phone]    Patient is a 70y old  Female who presents with a chief complaint of CVA (2021 09:08)      SUBJECTIVE / OVERNIGHT EVENTS:  No events overnight. No new complaints from the patient.     MEDICATIONS  (STANDING):  aspirin  chewable 81 milliGRAM(s) Oral daily  atorvastatin 80 milliGRAM(s) Oral at bedtime  clopidogrel Tablet 75 milliGRAM(s) Oral daily  dexAMETHasone  Injectable 6 milliGRAM(s) IV Push daily  dextrose 40% Gel 15 Gram(s) Oral once  dextrose 5%. 1000 milliLiter(s) (50 mL/Hr) IV Continuous <Continuous>  dextrose 5%. 1000 milliLiter(s) (100 mL/Hr) IV Continuous <Continuous>  dextrose 50% Injectable 25 Gram(s) IV Push once  dextrose 50% Injectable 12.5 Gram(s) IV Push once  dextrose 50% Injectable 25 Gram(s) IV Push once  glucagon  Injectable 1 milliGRAM(s) IntraMuscular once  heparin   Injectable 5000 Unit(s) SubCutaneous every 8 hours  insulin glargine Injectable (LANTUS) 26 Unit(s) SubCutaneous at bedtime  insulin lispro (ADMELOG) corrective regimen sliding scale   SubCutaneous at bedtime  insulin lispro (ADMELOG) corrective regimen sliding scale   SubCutaneous three times a day before meals  insulin lispro Injectable (ADMELOG) 20 Unit(s) SubCutaneous three times a day before meals  remdesivir  IVPB 100 milliGRAM(s) IV Intermittent every 24 hours  remdesivir  IVPB   IV Intermittent     MEDICATIONS  (PRN):    CAPILLARY BLOOD GLUCOSE  POCT Blood Glucose.: 403 mg/dL (2021 16:41)  POCT Blood Glucose.: 399 mg/dL (2021 16:41)  POCT Blood Glucose.: 341 mg/dL (2021 11:43)  POCT Blood Glucose.: 212 mg/dL (2021 07:47)  POCT Blood Glucose.: 467 mg/dL (2021 21:36)    I&O's Summary    2021 07:01  -  2021 07:00  --------------------------------------------------------  IN: 720 mL / OUT: 1750 mL / NET: -1030 mL    2021 07:01  -  2021 17:48  --------------------------------------------------------  IN: 600 mL / OUT: 800 mL / NET: -200 mL        PHYSICAL EXAM:  Vital Signs Last 24 Hrs  T(C): 37.2 (2021 11:20), Max: 37.8 (2021 04:54)  T(F): 98.9 (2021 11:20), Max: 100 (2021 04:54)  HR: 89 (:20) (89 - 93)  BP: 136/83 (2021 11:20) (126/82 - 152/93)  BP(mean): --  RR: 19 (2021 11:20) (19 - 20)  SpO2: 90% (2021 11:20) (90% - 95%)    GENERAL: sleeping, arousable, NAD, overweight  HEAD:  Atraumatic, Normocephalic  EYES: EOMI, conjunctiva and sclera clear  NECK: Supple, No JVD  CHEST/LUNG: Clear to auscultation bilaterally; No rales  HEART: rrr, S1, S2, no murmurs   ABDOMEN: Bowel sounds present; Soft, NT, ND.   EXTREMITIES:  No edema  NERVOUS SYSTEM:  AOx2 to person and hospital, mild dysarthria, follows commands, 4/5 strength RUE and 3+/5 RLE, 5/5 strength LUE/LLE  MSK: no joint swelling       LABS:                        14.3   10.12 )-----------( 198      ( 2021 05:31 )             44.4     02-    141  |  106  |  23  ----------------------------<  242<H>  4.2   |  25  |  1.03    Ca    8.5      2021 05:31  Phos  3.4       Mg     2.2         TPro  6.8  /  Alb  3.6  /  TBili  0.2  /  DBili  x   /  AST  45<H>  /  ALT  66<H>  /  AlkPhos  65  02-    PT/INR - ( 2021 06:19 )   PT: 13.0 sec;   INR: 1.09 ratio         PTT - ( 2021 06:19 )  PTT:25.4 sec      Urinalysis Basic - ( 2021 21:54 )    Color: Light Yellow / Appearance: Clear / S.036 / pH: x  Gluc: x / Ketone: Small  / Bili: Negative / Urobili: Negative   Blood: x / Protein: 30 mg/dL / Nitrite: Positive   Leuk Esterase: Small / RBC: 3 /hpf / WBC 10 /HPF   Sq Epi: x / Non Sq Epi: 1 /hpf / Bacteria: Many        Culture - Blood (collected 2021 03:47)  Source: .Blood Blood-Peripheral  Preliminary Report (2021 04:01):    No growth to date.    Culture - Blood (collected 2021 03:47)  Source: .Blood Blood-Peripheral  Preliminary Report (2021 04:01):    No growth to date.    Culture - Urine (collected 2021 03:44)  Source: .Urine Clean Catch (Midstream)  Preliminary Report (2021 08:58):    >100,000 CFU/ml Escherichia coli        RADIOLOGY & ADDITIONAL TESTS:    Lab Results Reviewed: no leukocytosis, d-dimer elevated but downtrending, hyperglycemia 400s, HgA2C 10.1, , TChol 227.   Urine culture >100,000 E.coli (sensitivities pending)  Blood cultures - no growth to date    Imaging Personally Reviewed:   CXR- clear lungs, right hemidiaphragm elevation      [facility] Division of Hospital Medicine  Yovanny Almonte MD, ZELALEM  Pager (OLIVERIO-VIRGIL, 9T-5P): [phone]  Other Times:  [phone]    Patient is a 70y old  Female who presents with a chief complaint of CVA (2021 09:08)      SUBJECTIVE / OVERNIGHT EVENTS:  No events overnight. No new complaints from the patient.     MEDICATIONS  (STANDING):  aspirin  chewable 81 milliGRAM(s) Oral daily  atorvastatin 80 milliGRAM(s) Oral at bedtime  clopidogrel Tablet 75 milliGRAM(s) Oral daily  dexAMETHasone  Injectable 6 milliGRAM(s) IV Push daily  glucagon  Injectable 1 milliGRAM(s) IntraMuscular once  heparin   Injectable 5000 Unit(s) SubCutaneous every 8 hours  insulin glargine Injectable (LANTUS) 26 Unit(s) SubCutaneous at bedtime  insulin lispro (ADMELOG) corrective regimen sliding scale   SubCutaneous at bedtime  insulin lispro (ADMELOG) corrective regimen sliding scale   SubCutaneous three times a day before meals  insulin lispro Injectable (ADMELOG) 20 Unit(s) SubCutaneous three times a day before meals  remdesivir  IVPB 100 milliGRAM(s) IV Intermittent every 24 hours  remdesivir  IVPB   IV Intermittent     MEDICATIONS  (PRN):    CAPILLARY BLOOD GLUCOSE  POCT Blood Glucose.: 403 mg/dL (2021 16:41)  POCT Blood Glucose.: 399 mg/dL (2021 16:41)  POCT Blood Glucose.: 341 mg/dL (2021 11:43)  POCT Blood Glucose.: 212 mg/dL (2021 07:47)  POCT Blood Glucose.: 467 mg/dL (2021 21:36)    I&O's Summary    2021 07:01  -  2021 07:00  --------------------------------------------------------  IN: 720 mL / OUT: 1750 mL / NET: -1030 mL    2021 07:01  -  2021 17:48  --------------------------------------------------------  IN: 600 mL / OUT: 800 mL / NET: -200 mL        PHYSICAL EXAM:  Vital Signs Last 24 Hrs  T(C): 37.2 (2021 11:20), Max: 37.8 (2021 04:54)  T(F): 98.9 (2021 11:20), Max: 100 (2021 04:54)  HR: 89 (2021 11:20) (89 - 93)  BP: 136/83 (2021 11:20) (126/82 - 152/93)  BP(mean): --  RR: 19 (2021 11:20) (19 - 20)  SpO2: 90% (2021 11:20) (90% - 95%)    GENERAL: sleeping, arousable, NAD, overweight  HEAD:  Atraumatic, Normocephalic  EYES: EOMI, conjunctiva and sclera clear  NECK: Supple, No JVD  CHEST/LUNG: Clear to auscultation bilaterally; No rales  HEART: rrr, S1, S2, no murmurs   ABDOMEN: Bowel sounds present; Soft, NT, ND.   EXTREMITIES:  No edema  NERVOUS SYSTEM:  AOx3, slower to answer but no dysarthria noted, follows commands, 4/5 strength RUE and 3+/5 RLE, 5/5 strength LUE/LLE  MSK: no joint swelling       LABS:                        14.3   10.12 )-----------( 198      ( 2021 05:31 )             44.4         141  |  106  |  23  ----------------------------<  242<H>  4.2   |  25  |  1.03    Ca    8.5      2021 05:31  Phos  3.4       Mg     2.2         TPro  6.8  /  Alb  3.6  /  TBili  0.2  /  DBili  x   /  AST  45<H>  /  ALT  66<H>  /  AlkPhos  65      PT/INR - ( 2021 06:19 )   PT: 13.0 sec;   INR: 1.09 ratio         PTT - ( 2021 06:19 )  PTT:25.4 sec      Urinalysis Basic - ( 2021 21:54 )    Color: Light Yellow / Appearance: Clear / S.036 / pH: x  Gluc: x / Ketone: Small  / Bili: Negative / Urobili: Negative   Blood: x / Protein: 30 mg/dL / Nitrite: Positive   Leuk Esterase: Small / RBC: 3 /hpf / WBC 10 /HPF   Sq Epi: x / Non Sq Epi: 1 /hpf / Bacteria: Many        Culture - Blood (collected 2021 03:47)  Source: .Blood Blood-Peripheral  Preliminary Report (2021 04:01):    No growth to date.    Culture - Blood (collected 2021 03:47)  Source: .Blood Blood-Peripheral  Preliminary Report (2021 04:01):    No growth to date.    Culture - Urine (collected 2021 03:44)  Source: .Urine Clean Catch (Midstream)  Preliminary Report (2021 08:58):    >100,000 CFU/ml Escherichia coli        RADIOLOGY & ADDITIONAL TESTS:    Lab Results Reviewed: no leukocytosis, d-dimer elevated but downtrending, hyperglycemia 400s, HgA2C 10.1, , TChol 227.   Urine culture >100,000 E.coli (sensitivities pending)  Blood cultures - no growth to date    Imaging Personally Reviewed:   CXR- clear lungs, right hemidiaphragm elevation

## 2021-02-01 NOTE — PROGRESS NOTE ADULT - PROBLEM SELECTOR PLAN 5
- Diagnosed 1/30  - Will start decadron and remdesivir   - Oxygen as above  - Monitor LFTs while on remdesivir   - Trend inflammatory markers - Diagnosed 1/30  - C/w decadron and remdesivir (day 2)  - Oxygen as above  - Monitor LFTs while on remdesivir   - Trend inflammatory markers

## 2021-02-01 NOTE — PROGRESS NOTE ADULT - PROBLEM SELECTOR PLAN 1
- Hx of stroke in 2015 resulting in slurred speech, difficulty walking weakness on her right presenting with basilar stenosis with symptoms reminiscent of 2015 stroke (slurred speech and R sided weakness)  - Neurology consult appreciated  - CTA severe stenosis of the proximal basilar artery  - DAPT with asa 81 and plavix 75mg x 3 months then monotherapy ASA 81 alone, Atorvastatin 80, titrate to LDL<70, DVT prophylaxis  - MRI brain and MR NOVA (determination of the presence of stroke and vascular perfusion evaluation)  - TTE with bubble study and telemetry to look for a cardiac source of embolism, can defer if MRI negative for stroke  - Monitor on telemetry  - Neuro checks q4 on the floor  - Permissive HTN (goal -180)  - Given degree of basilar stenosis recommend blood pressure maintained above 140/80  - PT/OT eval   - NPO until clears Dysphagia screen, otherwise Swallow evaluation - Hx of stroke in 2015 resulting in slurred speech, difficulty walking weakness on her right presenting with basilar stenosis with symptoms reminiscent of 2015 stroke (slurred speech and R sided weakness)  - Neurology consult appreciated  - CTA severe stenosis of the proximal basilar artery  - DAPT with asa 81 and plavix 75mg x 3 months then monotherapy ASA 81 alone, Atorvastatin 80, titrate to LDL<70, DVT prophylaxis  - MRI brain and MR NOVA (determination of the presence of stroke and vascular perfusion evaluation)  - TTE with bubble study and telemetry to look for a cardiac source of embolism, can defer if MRI negative for stroke  - Monitor on telemetry  - Neuro checks q4 on the floor  - Permissive HTN (goal -180)  - PT/OT eval - recommended for NAYAN  - Tolerating diet

## 2021-02-01 NOTE — PROGRESS NOTE ADULT - PROBLEM SELECTOR PROBLEM 7
Type 2 diabetes mellitus without complication, unspecified whether long term insulin use Hypertension, unspecified type

## 2021-02-01 NOTE — PROGRESS NOTE ADULT - ASSESSMENT
71 yo RH F with HTN, HLD, DM stroke 2016 with slurred speech and R weakness found to have COVID 19 PNA as well as UTI.  sent to [facility] as CTA demonstrates severe BA focal stenosis.  A1c 10.1%, LDL 150mg/dL. likely large artery athero  - trend inflammatory markers.  - DAPT with asa 81 and plavix 75mg x 3 months then monotherapy  - high dose statin, lipitor 80mg daily  - treat infection  - on decadron  - MRI brain  - MR NOVA can be deferred to after treatment of covid infection  - avoid hypotension -180 .   - PT/OT  - check FS, glucose control <180  - GI/DVT ppx  - Counseling on diet, exercise, and medication adherence was done  - Counseling on smoking cessation and alcohol consumption offered when appropriate.  - Pain assessed and judicious use of narcotics when appropriate was discussed.    - Stroke education given when appropriate.  - Importance of fall prevention discussed.   - Differential diagnosis and plan of care discussed with patient and/or family and primary team  - Thank you for allowing me to participate in the care of this patient. Call with questions.   Naga De Leon MD  Vascular Neurology  Office: [phone]

## 2021-02-01 NOTE — PROGRESS NOTE ADULT - PROBLEM SELECTOR PLAN 6
- U/A positive  - C/w ceftriaxone, f/u urine culture - HgA1C 10.1. Discussed with daughter- the patient has significant dietary indiscretions.   - On insulin at home  - Endocrine consulted to help with hyperglycemia management (worse now that she's on decadron)  - Trend fingersticks

## 2021-02-01 NOTE — PROGRESS NOTE ADULT - PROBLEM SELECTOR PLAN 10
- DVT PPX: heparin SQ  - Diet: NPO, awaiting S/S and decision on whether pt will get procedure  - Dispo: pending  - Medication management: daughter unclear of home meds, need to do med rec via pharmacy in the AM

## 2021-02-01 NOTE — PROGRESS NOTE ADULT - PROBLEM SELECTOR PLAN 4
- Tachypneic now on 3L NC  - Likely in the setting of COVID  - Wean oxygen as tolerated  - Monitor POX - Currently on 3L NC, sats 90%  - Likely in the setting of COVID  - Wean oxygen as tolerated

## 2021-02-01 NOTE — PROGRESS NOTE ADULT - ASSESSMENT
70F with HTN, HLD, DMT2, sciatica, and history of stroke in 2015 resulting in slurred speech, difficulty walking and weakness on her right side, presenting as a transfer from [facility] (MR [id number]) for basilar artery stenosis in the setting of slurred speech and R-sided weakness. Transferred for possible endovascular intervention. Also admitted for sepsis 2/2 UTI and acute hypoxic respiratory failure 2/2 COVID.

## 2021-02-01 NOTE — PROGRESS NOTE ADULT - PROBLEM SELECTOR PLAN 3
- T 103.5 F   - COVID +, U/A +, lactate 1.3   - S/p ceftriaxone 1 g IV, NS 1L bolus in the ED   - Will c/w ceftriaxone   - F/u CXR, blood cxs, urine cx - T 103.5 F   - COVID +, U/A +, lactate 1.3   - Completed ceftriaxone IV x 3 days for UTI  - Urine cultures with E.coli (sensitivities pending)

## 2021-02-01 NOTE — PROGRESS NOTE ADULT - PROBLEM SELECTOR PROBLEM 6
Urinary tract infection without hematuria, site unspecified Type 2 diabetes mellitus without complication, unspecified whether long term insulin use

## 2021-02-02 NOTE — DIETITIAN INITIAL EVALUATION ADULT. - PROBLEM SELECTOR PLAN 5
- Diagnosed 1/30  - Will start decadron and remdesivir   - Oxygen as above  - Monitor LFTs while on remdesivir   - Trend inflammatory markers

## 2021-02-02 NOTE — CONSULT NOTE ADULT - SUBJECTIVE AND OBJECTIVE BOX
HPI:  71 yo female with PMHx of HTN, HLD, DM2 who was transferred from [facility] for basilar artery stenosis. While being evaluated at [facility] patient was noted to have a UTI as well as a fever. COVID screen returned positive. Patient currently being treated for UTI and COVID PNA, on steroids.   Endocrine Team consulted for inpatient T2DM management.     Endocrine History:  Patient diagnosed with DM2 ~10 years ago.  Currently on Basaglar 24 units qhs, Novolog 13 units TID, Metformin 500mg BID, and Tradjenta 5mg daily.  Patient compliant with regimen.  + neuropathy  Fingersticks above goal at home due to diet.    Interval History:  Patient started on decadron for COVID.  BG ~300-400s on basal/bolus regimen.  Patient tolerating PO. No reports of n/v/d, abdominal pain.    PAST MEDICAL & SURGICAL HISTORY:  Cerebrovascular accident (CVA)  2015  Chronic low back pain, unspecified back pain laterality, unspecified whether sciatica present  sciatica  Diabetes mellitus  HLD (hyperlipidemia)  HTN (hypertension)    FAMILY HISTORY:  DM2: mother    Social History:  Denies EtOH, tobacco, or illicit drug use    Outpatient Medications:  Aspirin 81 oral delayed release tablet: 1 tab(s) orally once a day  Basaglar KwikPen 100 units/mL subcutaneous solution: 24 unit(s) subcutaneous once a day (at bedtime)  lisinopril 20 mg oral tablet: 1 tab(s) orally 2 times a day  metFORMIN 500 mg oral tablet: 1 tab(s) orally 2 times a day  Norvasc 5 mg oral tablet: 1 tab(s) orally once a day  NovoLOG FlexPen 100 units/mL injectable solution: 13 unit(s) subcutaneous 3 times a day  Tradjenta 5 mg oral tablet: 1 tab(s) orally once a day    MEDICATIONS  (STANDING):  atorvastatin 80 milliGRAM(s) Oral at bedtime  dexAMETHasone  Injectable 6 milliGRAM(s) IV Push daily  dextrose 40% Gel 15 Gram(s) Oral once  dextrose 5%. 1000 milliLiter(s) (50 mL/Hr) IV Continuous <Continuous>  dextrose 5%. 1000 milliLiter(s) (100 mL/Hr) IV Continuous <Continuous>  dextrose 50% Injectable 25 Gram(s) IV Push once  dextrose 50% Injectable 12.5 Gram(s) IV Push once  dextrose 50% Injectable 25 Gram(s) IV Push once  enoxaparin Injectable 100 milliGRAM(s) SubCutaneous two times a day  ergocalciferol 26194 Unit(s) Oral once  glucagon  Injectable 1 milliGRAM(s) IntraMuscular once  insulin glargine Injectable (LANTUS) 26 Unit(s) SubCutaneous at bedtime  insulin lispro (ADMELOG) corrective regimen sliding scale   SubCutaneous three times a day before meals  insulin lispro (ADMELOG) corrective regimen sliding scale   SubCutaneous at bedtime  insulin lispro Injectable (ADMELOG) 20 Unit(s) SubCutaneous three times a day before meals  polyethylene glycol 3350 17 Gram(s) Oral daily  remdesivir  IVPB 100 milliGRAM(s) IV Intermittent every 24 hours  remdesivir  IVPB   IV Intermittent     MEDICATIONS  (PRN):    Allergies  No Known Allergies    Review of Systems:  General: no fevers, chills, poor appetite, or weight loss  HEENT: no blurry vision, no headache  CV: no chest pain, no palpitations  Pulm: + SOB, no cough  GI: no n/v/d, no abdominal pain  : no dysuria, no hematuria  Skin: no rash or ulcers  Endocrine: no polyuria, polydipsia  Neuro: no tremors  Psych: no depressed mood    Physical exam  VITALS: T(C): 36.8 (02-02-21 @ 11:28)  T(F): 98.2 (02-02-21 @ 11:28), Max: 98.7 (02-02-21 @ 03:32)  HR: 92 (02-02-21 @ 11:28) (82 - 92)  BP: 160/99 (02-02-21 @ 11:28) (129/88 - 160/99)  RR:  (18 - 18)  SpO2:  (90% - 94%)  Wt(kg): 99.8  General: NAD, well, developed  Eyes: no proptosis, anicteric  Pulm: On NC, not in respiratory distress  Abd: non-distended  Neuro: A&Ox3  Psych: reactive affect, euthymic mood    POCT Blood Glucose.: 484 mg/dL (02-02-21 @ 13:31)  POCT Blood Glucose.: 470 mg/dL (02-02-21 @ 13:29)  POCT Blood Glucose.: 465 mg/dL (02-02-21 @ 11:47)  POCT Blood Glucose.: 459 mg/dL (02-02-21 @ 11:46)  POCT Blood Glucose.: 302 mg/dL (02-02-21 @ 08:08)  POCT Blood Glucose.: 435 mg/dL (02-01-21 @ 21:22)  POCT Blood Glucose.: 449 mg/dL (02-01-21 @ 21:22)  POCT Blood Glucose.: 403 mg/dL (02-01-21 @ 16:41)  POCT Blood Glucose.: 399 mg/dL (02-01-21 @ 16:41)  POCT Blood Glucose.: 341 mg/dL (02-01-21 @ 11:43)  POCT Blood Glucose.: 212 mg/dL (02-01-21 @ 07:47)  POCT Blood Glucose.: 467 mg/dL (01-31-21 @ 21:36)  POCT Blood Glucose.: 491 mg/dL (01-31-21 @ 16:35)  POCT Blood Glucose.: 385 mg/dL (01-31-21 @ 14:44)  POCT Blood Glucose.: 324 mg/dL (01-31-21 @ 07:35)  POCT Blood Glucose.: 278 mg/dL (01-31-21 @ 05:16)                            14.3   8.86  )-----------( 179      ( 02 Feb 2021 06:22 )             44.0       02-02    141  |  107  |  16  ----------------------------<  357<H>  4.2   |  23  |  0.83    EGFR if : 83  EGFR if non : 71    Ca    8.8      02-02  Mg     2.2     01-31  Phos  3.4     01-31    TPro  6.3  /  Alb  3.4  /  TBili  0.2  /  DBili  x   /  AST  42<H>  /  ALT  56<H>  /  AlkPhos  65  02-02    HbA1c 10.1                     HPI:  69 yo female with PMHx of HTN, HLD, DM2 who was transferred from [facility] for basilar artery stenosis. While being evaluated at [facility] patient was noted to have a UTI as well as a fever. COVID screen returned positive. Patient currently being treated for UTI and COVID PNA, on steroids.   Endocrine Team consulted for inpatient T2DM management.     Endocrine History:  Patient diagnosed with DM2 ~10 years ago.  Currently on Basaglar 24 units qhs, Novolog 13 units TID, Metformin 500mg BID, and Tradjenta 5mg daily.  Patient compliant with regimen.  + neuropathy  Fingersticks above goal at home due to diet.    Interval History:  Patient started on decadron for COVID.  BG ~300-400s on basal/bolus regimen.  Patient tolerating PO. No reports of n/v/d, abdominal pain.    PAST MEDICAL & SURGICAL HISTORY:  Cerebrovascular accident (CVA)  2015  Chronic low back pain, unspecified back pain laterality, unspecified whether sciatica present  sciatica  Diabetes mellitus  HLD (hyperlipidemia)  HTN (hypertension)    FAMILY HISTORY:  DM2: mother    Social History:  Denies EtOH, tobacco, or illicit drug use    Outpatient Medications:  Aspirin 81 oral delayed release tablet: 1 tab(s) orally once a day  Basaglar KwikPen 100 units/mL subcutaneous solution: 24 unit(s) subcutaneous once a day (at bedtime)  lisinopril 20 mg oral tablet: 1 tab(s) orally 2 times a day  metFORMIN 500 mg oral tablet: 1 tab(s) orally 2 times a day  Norvasc 5 mg oral tablet: 1 tab(s) orally once a day  NovoLOG FlexPen 100 units/mL injectable solution: 13 unit(s) subcutaneous 3 times a day  Tradjenta 5 mg oral tablet: 1 tab(s) orally once a day    MEDICATIONS  (STANDING):  atorvastatin 80 milliGRAM(s) Oral at bedtime  dexAMETHasone  Injectable 6 milliGRAM(s) IV Push daily  dextrose 40% Gel 15 Gram(s) Oral once  dextrose 5%. 1000 milliLiter(s) (50 mL/Hr) IV Continuous <Continuous>  dextrose 5%. 1000 milliLiter(s) (100 mL/Hr) IV Continuous <Continuous>  dextrose 50% Injectable 25 Gram(s) IV Push once  dextrose 50% Injectable 12.5 Gram(s) IV Push once  dextrose 50% Injectable 25 Gram(s) IV Push once  enoxaparin Injectable 100 milliGRAM(s) SubCutaneous two times a day  ergocalciferol 27099 Unit(s) Oral once  glucagon  Injectable 1 milliGRAM(s) IntraMuscular once  insulin glargine Injectable (LANTUS) 26 Unit(s) SubCutaneous at bedtime  insulin lispro (ADMELOG) corrective regimen sliding scale   SubCutaneous three times a day before meals  insulin lispro (ADMELOG) corrective regimen sliding scale   SubCutaneous at bedtime  insulin lispro Injectable (ADMELOG) 20 Unit(s) SubCutaneous three times a day before meals  polyethylene glycol 3350 17 Gram(s) Oral daily  remdesivir  IVPB 100 milliGRAM(s) IV Intermittent every 24 hours  remdesivir  IVPB   IV Intermittent     MEDICATIONS  (PRN):    Allergies  No Known Allergies    Review of Systems:  General: no fevers, chills, poor appetite, or weight loss  HEENT: no blurry vision, no headache  CV: no chest pain, no palpitations  Pulm: + SOB, no cough  GI: no n/v/d, no abdominal pain  : no dysuria, no hematuria  Skin: no rash or ulcers  Endocrine: no polyuria, polydipsia  Neuro: no tremors  Psych: no depressed mood    Physical exam  VITALS: T(C): 36.8 (02-02-21 @ 11:28)  T(F): 98.2 (02-02-21 @ 11:28), Max: 98.7 (02-02-21 @ 03:32)  HR: 92 (02-02-21 @ 11:28) (82 - 92)  BP: 160/99 (02-02-21 @ 11:28) (129/88 - 160/99)  RR:  (18 - 18)  SpO2:  (90% - 94%)  Wt(kg): 99.8  General: NAD, well, developed  Eyes: no proptosis, anicteric  Neck: no cervical or supraclavicular AYAH  Pulm: On NC, not in respiratory distress  Cards: no peripheral edema of legs  Abd: soft, non-distended  Neuro: A&Ox3  Psych: reactive affect, euthymic mood  Skin: no lesions on feet b/l    POCT Blood Glucose.: 484 mg/dL (02-02-21 @ 13:31)  POCT Blood Glucose.: 470 mg/dL (02-02-21 @ 13:29)  POCT Blood Glucose.: 465 mg/dL (02-02-21 @ 11:47)  POCT Blood Glucose.: 459 mg/dL (02-02-21 @ 11:46)  POCT Blood Glucose.: 302 mg/dL (02-02-21 @ 08:08)  POCT Blood Glucose.: 435 mg/dL (02-01-21 @ 21:22)  POCT Blood Glucose.: 449 mg/dL (02-01-21 @ 21:22)  POCT Blood Glucose.: 403 mg/dL (02-01-21 @ 16:41)  POCT Blood Glucose.: 399 mg/dL (02-01-21 @ 16:41)  POCT Blood Glucose.: 341 mg/dL (02-01-21 @ 11:43)  POCT Blood Glucose.: 212 mg/dL (02-01-21 @ 07:47)  POCT Blood Glucose.: 467 mg/dL (01-31-21 @ 21:36)  POCT Blood Glucose.: 491 mg/dL (01-31-21 @ 16:35)  POCT Blood Glucose.: 385 mg/dL (01-31-21 @ 14:44)  POCT Blood Glucose.: 324 mg/dL (01-31-21 @ 07:35)  POCT Blood Glucose.: 278 mg/dL (01-31-21 @ 05:16)                            14.3   8.86  )-----------( 179      ( 02 Feb 2021 06:22 )             44.0       02-02    141  |  107  |  16  ----------------------------<  357<H>  4.2   |  23  |  0.83    EGFR if : 83  EGFR if non : 71    Ca    8.8      02-02  Mg     2.2     01-31  Phos  3.4     01-31    TPro  6.3  /  Alb  3.4  /  TBili  0.2  /  DBili  x   /  AST  42<H>  /  ALT  56<H>  /  AlkPhos  65  02-02    HbA1c 10.1

## 2021-02-02 NOTE — DIETITIAN INITIAL EVALUATION ADULT. - LITERATURE/VIDEOS GIVEN
T2DM Nutrition therapy, Heart Healthy low sodium nutrition by [employer]

## 2021-02-02 NOTE — DIETITIAN INITIAL EVALUATION ADULT. - PERTINENT MEDS FT
MEDICATIONS  (STANDING):  atorvastatin 80 milliGRAM(s) Oral at bedtime  dexAMETHasone  Injectable 6 milliGRAM(s) IV Push daily  dextrose 40% Gel 15 Gram(s) Oral once  dextrose 5%. 1000 milliLiter(s) (50 mL/Hr) IV Continuous <Continuous>  dextrose 5%. 1000 milliLiter(s) (100 mL/Hr) IV Continuous <Continuous>  dextrose 50% Injectable 25 Gram(s) IV Push once  dextrose 50% Injectable 12.5 Gram(s) IV Push once  dextrose 50% Injectable 25 Gram(s) IV Push once  enoxaparin Injectable 100 milliGRAM(s) SubCutaneous two times a day  ergocalciferol 61346 Unit(s) Oral once  glucagon  Injectable 1 milliGRAM(s) IntraMuscular once  insulin glargine Injectable (LANTUS) 34 Unit(s) SubCutaneous at bedtime  insulin lispro (ADMELOG) corrective regimen sliding scale   SubCutaneous three times a day before meals  insulin lispro (ADMELOG) corrective regimen sliding scale   SubCutaneous at bedtime  insulin lispro Injectable (ADMELOG) 25 Unit(s) SubCutaneous three times a day before meals  polyethylene glycol 3350 17 Gram(s) Oral daily  remdesivir  IVPB 100 milliGRAM(s) IV Intermittent every 24 hours  remdesivir  IVPB   IV Intermittent

## 2021-02-02 NOTE — DIETITIAN INITIAL EVALUATION ADULT. - OTHER INFO
This admission: Pt with good PO intake, good appetite, >75% intake per flow sheets. S/p swallow evaluation on 1/31 with recommendations for regular textures and thin liquids. Noted pt does complain food "gets stuck" ~3x per week. Will notify provider.    Pt with hyperglycemia also related to steroids, now managed with lantus and preprandial insulin. Noted pt was receiving high protein apple juice, now removed from tray considering elevated BG.     Pt confirms NKFA, no nausea/vomiting, no difficulty chewing, no GI distress, no micronutrient supplementation PTA, last BM 1/31.    Weight Hx: Reports UBW ~230 pounds, denies recent wt changes. Dosing wt is 220 pounds which is a stated weight.

## 2021-02-02 NOTE — PROGRESS NOTE ADULT - ASSESSMENT
71 yo RH F with HTN, HLD, DM stroke 2016 with slurred speech and R weakness found to have COVID 19 PNA as well as UTI.  sent to [facility] as CTA demonstrates severe BA focal stenosis.  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  - DAPT with asa 81 and plavix 75mg x 3 months then monotherapy. given stroke and covid infection. would start full dose lovenox  at this point and can d/c antiplatelets from stroke standpoint.  full dose lovenox x 1 month then place back on DAPT.   - suggest diagnostic angio with INR Dr. Hernandez - multifocal stenosis, blister aneurysm.   - high dose statin, lipitor 80mg daily  - treat infection  - on decadron  - avoid hypotension -180 .   - PT/OT  - check FS, glucose control <180  - GI/DVT ppx  - Counseling on diet, exercise, and medication adherence was done  - Counseling on smoking cessation and alcohol consumption offered when appropriate.  - Pain assessed and judicious use of narcotics when appropriate was discussed.    - Stroke education given when appropriate.  - Importance of fall prevention discussed.   - Differential diagnosis and plan of care discussed with patient and/or family and primary team  - Thank you for allowing me to participate in the care of this patient. Call with questions.   Naga De Leon MD  Vascular Neurology  Office: [phone]  69 yo RH F with HTN, HLD, DM stroke 2016 with slurred speech and R weakness found to have COVID 19 PNA as well as UTI.  sent to [facility] as CTA demonstrates severe BA focal stenosis.  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  - DAPT with asa 81 and plavix 75mg x 3 months then monotherapy. given stroke and covid infection. would start full dose lovenox  at this point and can d/c antiplatelets from stroke standpoint.  full dose lovenox x 1 month then place back on DAPT.   - suggest diagnostic angio with INR Dr. Hernandez - multifocal stenosis including severe BA stenosis, blister aneurysm.   - high dose statin, lipitor 80mg daily  - treat infection  - on decadron  - avoid hypotension -180 .   - PT/OT  - check FS, glucose control <180  - GI/DVT ppx  - Counseling on diet, exercise, and medication adherence was done  - Counseling on smoking cessation and alcohol consumption offered when appropriate.  - Pain assessed and judicious use of narcotics when appropriate was discussed.    - Stroke education given when appropriate.  - Importance of fall prevention discussed.   - Differential diagnosis and plan of care discussed with patient and/or family and primary team  - Thank you for allowing me to participate in the care of this patient. Call with questions.   Naga De Leon MD  Vascular Neurology  Office: [phone]  No

## 2021-02-02 NOTE — PROGRESS NOTE ADULT - PROBLEM SELECTOR PLAN 2
- Baseline AOx3, ambulates with cane   - Likely multifactorial in the setting of basilar a. stenosis, sepsis, COVID   - Answering questions appropriately

## 2021-02-02 NOTE — PROGRESS NOTE ADULT - PROBLEM SELECTOR PLAN 1
- Hx of stroke in 2015 resulting in slurred speech, difficulty walking weakness on her right presenting with basilar stenosis with symptoms reminiscent of 2015 stroke (slurred speech and R sided weakness)  - Neurology follow up appreciated- suspected embolic stroke per neuro attending.  - Sending antiphospholipid work up tomorrow morning. Check TTE with bubble study.   - MRA consisted with 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 Dr. Hernandez from Neuro-radiology to comment on possible angiogram.   - Per neuro, stopped DAPT (ASA and plavix) - started full dose lovenox 100mg BID  - Continue Atorvastatin 80mg qHS, goal LDL<70 (currently 150)  - Monitor on telemetry  - Neuro checks q4 on the floor  - Permissive HTN (goal -180) Has been at goal.  - PT/OT eval - recommended for NAYAN (daughters agreeable)  - Tolerating diet

## 2021-02-02 NOTE — DIETITIAN INITIAL EVALUATION ADULT. - PROBLEM SELECTOR PLAN 1
- Hx of stroke in 2015 resulting in slurred speech, difficulty walking weakness on her right presenting with basilar stenosis with symptoms reminiscent of 2015 stroke (slurred speech and R sided weakness)  - Neurology consulted, may represent recrudescence of old stroke symptoms rather than new stroke  - CTA severe stenosis of the proximal basilar artery  - May benefit from endovascular intervention. Pending evaluation by interventional team tomorrow  - Aspirin 81 and Plavix 300mg loading dose then [id number] for 3 weeks followed by ASA 81 alone, Atorvastatin 80, titrate to LDL<70, DVT prophylaxis  - MRI brain w/o contrast for determination of the presence of stroke   - TTE with bubble study and telemetry to look for a cardiac source of embolism, can defer if MRI negative for stroke  - F/u A1c, lipid panel   - Monitor on telemetry  - Neuro checks q4 on the floor, spoke to Neurology resident regarding q2 neuro checks -- Neuro will come in between the floor neuro checks to do additional neuro checks   - Permissive HTN up to 220/120 mmHg for first 24 hours  - Given degree of basilar stenosis recommend blood pressure maintained above 140/80  - PT/OT eval   - NPO until clears Dysphagia screen, otherwise Swallow evaluation

## 2021-02-02 NOTE — DIETITIAN INITIAL EVALUATION ADULT. - ORAL INTAKE PTA/DIET HISTORY
Pt reports good Po intake PTA, good appetite. Per endocrinology note, the pt's diet was not well controlled, her HbA1c is 10.1% and the pt takes basaglar, novolog, tradjenta, metformin. The pt does not follow a modified diet at home.

## 2021-02-02 NOTE — DIETITIAN INITIAL EVALUATION ADULT. - PERSON TAUGHT/METHOD
Discussed type 2 DM nutrition education, carb sources, carb portions, pairing protein with carb. Briefly discussed foods high in saturated fat./verbal instruction/written material/patient instructed

## 2021-02-02 NOTE — DIETITIAN INITIAL EVALUATION ADULT. - ADD RECOMMEND
Pt is aware RD remains available to review nutrition education. Monitor PO intake/need for nutrition supplementation.

## 2021-02-02 NOTE — DIETITIAN INITIAL EVALUATION ADULT. - CHIEF COMPLAINT
70F with HTN, HLD, DMT2, sciatica, and history of stroke in 2015 resulting in slurred speech, difficulty walking and weakness on her right side, presenting as a transfer from [facility] (MR [id number]) for basilar artery stenosis in the setting of slurred speech and R-sided weakness. Transferred for possible endovascular intervention. Also admitted for sepsis 2/2 UTI and acute hypoxic respiratory failure 2/2 COVID.

## 2021-02-02 NOTE — DIETITIAN INITIAL EVALUATION ADULT. - REASON INDICATOR FOR ASSESSMENT
Nutrition consult received for education.  Unable to conduct a face to face interview or nutrition-focused physical exam due to limited contact restrictions related to Pt's medical condition and isolation precautions. Information obtained via phone call with pt and from EMR.

## 2021-02-02 NOTE — DIETITIAN INITIAL EVALUATION ADULT. - NSPROEDAREADYLEARN_GEN_A_NUR
Extensive education deferred at this time. Focused discussion on DM nutrition education. Pt still requires Heart Healthy diet education./acuteness of illness

## 2021-02-02 NOTE — DIETITIAN INITIAL EVALUATION ADULT. - PROBLEM SELECTOR PLAN 3
- T 103.5 F   - COVID +, U/A +, lactate 1.3   - S/p ceftriaxone 1 g IV, NS 1L bolus in the ED   - Will c/w ceftriaxone   - F/u CXR, blood cxs, urine cx

## 2021-02-02 NOTE — DIETITIAN INITIAL EVALUATION ADULT. - PROBLEM SELECTOR PLAN 7
- On insulin at home, unclear doses, need to clarify meds   - F/u A1c  - Will start weight based lantus 16 U, low dose ISS while NPO   - Trend fingersticks  - Will likely need to up titrate given will also be on steroids

## 2021-02-02 NOTE — DIETITIAN INITIAL EVALUATION ADULT. - PROBLEM SELECTOR PLAN 4
- Tachypneic now on 3L NC  - Likely in the setting of COVID  - Wean oxygen as tolerated  - Monitor POX

## 2021-02-02 NOTE — CONSULT NOTE ADULT - ASSESSMENT
71 yo female with PMHx of HTN, HLD, Uncontrolled DM2 (HbA1c 10.1 c/b neuropathy) who was transferred from [facility] for basilar artery stenosis. While being evaluated at [facility] patient was noted to have a UTI as well as a fever. COVID screen returned positive. Patient currently being treated for UTI and COVID PNA, on steroids.   Endocrine Team consulted for inpatient T2DM management.     Problem 1: Uncontrolled T2DM (HbA1c 10.1 c/b neuropathy) with concomitant steroid induced hyperglycemia:  Home Regimen: Basaglar 24 units qhs, Novolog 13 units TID, Metformin 500mg BID, and Tradjenta 5mg daily  - Increase Lantus to 34 units sq qhs  - Increase Admelog to 25 units AC meals (Hold if patient is NPO)  - Moderate correctional scale before meals and qhs  - Monitor FS before meals and qhs (Inpatient Goal 100-180 mg/dl)  - Consistent Carb Diet    Discharge Plan:   - Patient may be able to be discharged on home regimen pending inpatient course and steroid taper. Doses TBD.  - Patient can f/u with Endocrine outpatient at the location provided below:  [address], [address]  [place] [address]  [phone]    Problem 2: HTN  - Currently above goal (Goal <130/80)  - Management per primary team    Problem 3: HLD  - Continue statin    Bere Muhammad M.D  Endocrine Fellow  Pager #[phone]

## 2021-02-02 NOTE — DIETITIAN INITIAL EVALUATION ADULT. - PERTINENT LABORATORY DATA
02-02 Na141 mmol/L Glu 357 mg/dL<H> K+ 4.2 mmol/L Cr  0.83 mg/dL BUN 16 mg/dL Phos n/a   Alb 3.4 g/dL PAB n/a    Cholesterol 227 mg/dL; Direct LDL --; HDL Cholesterol, Serum 44 mg/dL; HDL/ Total Cholesterol Ratio Measurement --; Total Cholesterol/ HDL Ratio Measurement --; Triglycerides, Serum 164 mg/dL    POCT Blood Glucose.: 484 mg/dL (02 Feb 2021 13:31)  POCT Blood Glucose.: 470 mg/dL (02 Feb 2021 13:29)  POCT Blood Glucose.: 465 mg/dL (02 Feb 2021 11:47)  POCT Blood Glucose.: 459 mg/dL (02 Feb 2021 11:46)  POCT Blood Glucose.: 302 mg/dL (02 Feb 2021 08:08)

## 2021-02-02 NOTE — PROGRESS NOTE ADULT - PROBLEM SELECTOR PLAN 5
- Diagnosed 1/30  - C/w decadron and remdesivir (day 3)  - Oxygen as above  - Monitor LFTs while on remdesivir   - Trend inflammatory markers

## 2021-02-02 NOTE — PROGRESS NOTE ADULT - PROBLEM SELECTOR PLAN 6
- HgA1C 10.1. Discussed with daughter- the patient has significant dietary indiscretions.   - On insulin at home  - Endocrine consulted to help with hyperglycemia management (worse now that she's on decadron)  - Trend fingersticks

## 2021-02-02 NOTE — PROGRESS NOTE ADULT - PROBLEM SELECTOR PLAN 3
- Due to COVID+, UTI -   - Completed ceftriaxone IV x 3 days for UTI (urine cultures with E.coli)  - Remdesivir day 3 of 5

## 2021-02-02 NOTE — PROGRESS NOTE ADULT - ASSESSMENT
70F with HTN, HLD, DMT2, sciatica, and history of stroke in 2015 resulting in slurred speech, difficulty walking and weakness on her right side, presenting as a transfer from [facility] (MR [id number]) for basilar artery stenosis in the setting of slurred speech and R-sided weakness. Transferred for possible endovascular intervention. Also admitted for sepsis 2/2 UTI and acute respiratory failure with hypoxia 2/2 COVID-19 infection.

## 2021-02-02 NOTE — PROGRESS NOTE ADULT - SUBJECTIVE AND OBJECTIVE BOX
[facility] Division of Hospital Medicine  Yovanny Almonte MD, ZELALEM  Pager (OLIVERIO-F, 8A-5P): [phone]  Other Times:  [phone]    Patient is a 70y old  Female who presents with a chief complaint of CVA (02 Feb 2021 15:47)      SUBJECTIVE / OVERNIGHT EVENTS:  No events overnight. No new complaints.     MEDICATIONS  (STANDING):  atorvastatin 80 milliGRAM(s) Oral at bedtime  dexAMETHasone  Injectable 6 milliGRAM(s) IV Push daily  enoxaparin Injectable 100 milliGRAM(s) SubCutaneous two times a day  ergocalciferol 27800 Unit(s) Oral once  glucagon  Injectable 1 milliGRAM(s) IntraMuscular once  insulin glargine Injectable (LANTUS) 34 Unit(s) SubCutaneous at bedtime  insulin lispro (ADMELOG) corrective regimen sliding scale   SubCutaneous three times a day before meals  insulin lispro (ADMELOG) corrective regimen sliding scale   SubCutaneous at bedtime  insulin lispro Injectable (ADMELOG) 25 Unit(s) SubCutaneous three times a day before meals  polyethylene glycol 3350 17 Gram(s) Oral daily  remdesivir  IVPB 100 milliGRAM(s) IV Intermittent every 24 hours  remdesivir  IVPB   IV Intermittent     CAPILLARY BLOOD GLUCOSE  POCT Blood Glucose.: 437 mg/dL (02 Feb 2021 16:38)  POCT Blood Glucose.: 458 mg/dL (02 Feb 2021 16:37)  POCT Blood Glucose.: 484 mg/dL (02 Feb 2021 13:31)  POCT Blood Glucose.: 470 mg/dL (02 Feb 2021 13:29)  POCT Blood Glucose.: 465 mg/dL (02 Feb 2021 11:47)  POCT Blood Glucose.: 459 mg/dL (02 Feb 2021 11:46)  POCT Blood Glucose.: 302 mg/dL (02 Feb 2021 08:08)  POCT Blood Glucose.: 435 mg/dL (01 Feb 2021 21:22)  POCT Blood Glucose.: 449 mg/dL (01 Feb 2021 21:22)    I&O's Summary    01 Feb 2021 07:01  -  02 Feb 2021 07:00  --------------------------------------------------------  IN: 600 mL / OUT: 2150 mL / NET: -1550 mL        PHYSICAL EXAM:  Vital Signs Last 24 Hrs  T(C): 36.8 (02 Feb 2021 11:28), Max: 37.1 (02 Feb 2021 03:32)  T(F): 98.2 (02 Feb 2021 11:28), Max: 98.7 (02 Feb 2021 03:32)  HR: 92 (02 Feb 2021 11:28) (82 - 92)  BP: 160/99 (02 Feb 2021 11:28) (129/88 - 160/99)  BP(mean): --  RR: 18 (02 Feb 2021 11:28) (18 - 18)  SpO2: 90% (02 Feb 2021 11:28) (90% - 94%)    GENERAL: NAD, overweight  HEAD:  Atraumatic, Normocephalic  EYES: EOMI, conjunctiva and sclera clear  NECK: Supple, No JVD  CHEST/LUNG: Clear to auscultation bilaterally; No rales  HEART: rrr, S1, S2, no murmurs   ABDOMEN: Bowel sounds present; Soft, NT, ND.   EXTREMITIES:  No edema  NERVOUS SYSTEM:  AOx3, slower to answer with mild dysarthria noted, follows commands, 4/5 strength RUE and 3+/5 RLE, 5/5 strength LUE/LLE  MSK: no joint swelling       LABS:                        14.3   8.86  )-----------( 179      ( 02 Feb 2021 06:22 )             44.0     02-02    141  |  107  |  16  ----------------------------<  357<H>  4.2   |  23  |  0.83    Ca    8.8      02 Feb 2021 06:21    TPro  6.3  /  Alb  3.4  /  TBili  0.2  /  DBili  x   /  AST  42<H>  /  ALT  56<H>  /  AlkPhos  65  02-02              Culture - Blood (collected 31 Jan 2021 03:47)  Source: .Blood Blood-Peripheral  Preliminary Report (01 Feb 2021 04:01):    No growth to date.    Culture - Blood (collected 31 Jan 2021 03:47)  Source: .Blood Blood-Peripheral  Preliminary Report (01 Feb 2021 04:01):    No growth to date.    Culture - Urine (collected 31 Jan 2021 03:44)  Source: .Urine Clean Catch (Midstream)  Final Report (02 Feb 2021 13:27):    >100,000 CFU/ml Escherichia coli  Organism: Escherichia coli (02 Feb 2021 13:27)  Organism: Escherichia coli (02 Feb 2021 13:27)        RADIOLOGY & ADDITIONAL TESTS:    Lab Results Reviewed: hyperglycemia 400s    Imaging Personally Reviewed:   MRA brain and neck    COORDINATION OF CARE:  Care Discussed with Consultants/Other Providers:  Neurologist Dr. De Leon re: MRA findings, plan of care

## 2021-02-02 NOTE — DIETITIAN INITIAL EVALUATION ADULT. - PROBLEM SELECTOR PLAN 2
- Baseline AOx3, ambulates with cane   - Likely multifactorial in the setting of basilar a. stenosis, sepsis, COVID   - Currently AOx2, answering questions appropriately   - Tx of contributing etiologies as described

## 2021-02-02 NOTE — PROGRESS NOTE ADULT - SUBJECTIVE AND OBJECTIVE BOX
Neurology Progress Note    S: Patient seen and examined in covid unit on airborn and contact isolation. patient denied CP, SOB, HA or pain. in bed on NC    Medication:  MEDICATIONS  (STANDING):  aspirin  chewable 81 milliGRAM(s) Oral daily  atorvastatin 80 milliGRAM(s) Oral at bedtime  clopidogrel Tablet 75 milliGRAM(s) Oral daily  dexAMETHasone  Injectable 6 milliGRAM(s) IV Push daily  dextrose 40% Gel 15 Gram(s) Oral once  dextrose 5%. 1000 milliLiter(s) (50 mL/Hr) IV Continuous <Continuous>  dextrose 5%. 1000 milliLiter(s) (100 mL/Hr) IV Continuous <Continuous>  dextrose 50% Injectable 25 Gram(s) IV Push once  dextrose 50% Injectable 12.5 Gram(s) IV Push once  dextrose 50% Injectable 25 Gram(s) IV Push once  glucagon  Injectable 1 milliGRAM(s) IntraMuscular once  heparin   Injectable 5000 Unit(s) SubCutaneous every 8 hours  insulin glargine Injectable (LANTUS) 26 Unit(s) SubCutaneous at bedtime  insulin lispro (ADMELOG) corrective regimen sliding scale   SubCutaneous three times a day before meals  insulin lispro (ADMELOG) corrective regimen sliding scale   SubCutaneous at bedtime  insulin lispro Injectable (ADMELOG) 20 Unit(s) SubCutaneous three times a day before meals  remdesivir  IVPB 100 milliGRAM(s) IV Intermittent every 24 hours  remdesivir  IVPB   IV Intermittent     MEDICATIONS  (PRN):      Vitals:  ICU Vital Signs Last 24 Hrs  T(C): 36.8 (2021 11:28), Max: 37.1 (2021 03:32)  T(F): 98.2 (2021 11:28), Max: 98.7 (2021 03:32)  HR: 92 (2021 11:28) (82 - 92)  BP: 160/99 (2021 11:28) (129/88 - 160/99)  BP(mean): --  ABP: --  ABP(mean): --  RR: 18 (2021 11:28) (18 - 18)  SpO2: 90% (2021 11:28) (90% - 94%)    General Exam:   General Appearance: Appropriately dressed and in no acute distress       Head: Normocephalic, atraumatic and no dysmorphic features  Ear, Nose, and Throat: Moist mucous membranes  CVS: S1S2+  Resp: No SOB, no wheeze or rhonchi  Abd: soft NTND  Extremities: No edema, no cyanosis  Skin: No bruises, no rashes     Neurological Exam:  Mental Status: Awake, alert and oriented x 2-3.  Able to follow simple and complex verbal commands. Able to name and repeat. fluent speech. No obvious aphasia or dysarthria noted.   Cranial Nerves: PERRL, EOMI, VFFC, sensation V1-V3 intact,  mild R facial asymmetry , equal elevation of palate, scm/trap 5/5, tongue is midline on protrusion. no obvious papilledema on fundoscopic exam. Hearing is grossly intact.   Motor: RUE and RLE 4/5, LUE/LLE 5/5   Sensation: Intact to light touch and pinprick throughout.   Reflexes: 1+ throughout at biceps, brachioradialis, triceps, patellars and ankles bilaterally and equal. No clonus. R toe and L toe were both downgoing.  Coordination: No dysmetria on FNF   Gait: deferred     I personally reviewed the below data/images/labs:    CBC Full  -  ( 2021 06:22 )  WBC Count : 8.86 K/uL  RBC Count : 5.04 M/uL  Hemoglobin : 14.3 g/dL  Hematocrit : 44.0 %  Platelet Count - Automated : 179 K/uL  Mean Cell Volume : 87.3 fl  Mean Cell Hemoglobin : 28.4 pg  Mean Cell Hemoglobin Concentration : 32.5 gm/dL  Auto Neutrophil # : 6.72 K/uL  Auto Lymphocyte # : 1.54 K/uL  Auto Monocyte # : 0.55 K/uL  Auto Eosinophil # : 0.00 K/uL  Auto Basophil # : 0.02 K/uL  Auto Neutrophil % : 75.9 %  Auto Lymphocyte % : 17.4 %  Auto Monocyte % : 6.2 %  Auto Eosinophil % : 0.0 %  Auto Basophil % : 0.2 %          141  |  107  |  16  ----------------------------<  357<H>  4.2   |  23  |  0.83    Ca    8.8      2021 06:21    TPro  6.3  /  Alb  3.4  /  TBili  0.2  /  DBili  x   /  AST  42<H>  /  ALT  56<H>  /  AlkPhos  65      Urinalysis Basic - ( 2021 21:54 )    Color: Light Yellow / Appearance: Clear / S.036 / pH: x  Gluc: x / Ketone: Small  / Bili: Negative / Urobili: Negative   Blood: x / Protein: 30 mg/dL / Nitrite: Positive   Leuk Esterase: Small / RBC: 3 /hpf / WBC 10 /HPF   Sq Epi: x / Non Sq Epi: 1 /hpf / Bacteria: Many    Radiology:  -CT Head - unremarkable  -CTA head and neck 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    < from: MR Angio Neck w/wo IV Cont (21 @ 00:16) >    EXAM:  MR ANGIO BRAIN                          EXAM:  MR ANGIO NECK WAW IC                          EXAM:  MR BRAIN                            PROCEDURE DATE:  2021            INTERPRETATION:  CLINICAL INDICATION: COVID Positive, basilar stenosis, 70-year-old female with stroke, right-sided weakness and speech for 3 days, altered mental status,    Technique: Contrast brain MRI, non-contrast brain MRA and contrast neck MRA was performed.    Through the brain, sagittal and axial T1, axial T2, FLAIR, diffusion weighted images and an ADC map were obtained. Axial T1 post-contrast images were obtained and reconstructed in sagittal and coronal planes. 10 cc of intravenous gadolinium Gadavist gadolinium was administered and 0 discarded,  for contrast MRI brain and contrast enhanced MR angiography of the extracranial circulation.    MR angiography of intracranial and extracranial circulation was performed with time of flight imaging technique. Maximal intensity projection images were reviewed in multiple planes.    COMPARISON: Head CT, CT perfusion, CTA brain and neck, 2021    FINDINGS:  Brain MRI:  Foci restricted diffusion with hyperintense DWI, T2, FLAIR signal, left parietal lobe just above the occipital lobe (4:20, 400:20) concerning for new ischemia without hemorrhagic transformation. There is moderate enlargement of the ventricles and sulci greater than expected for age consistent with volume loss. There are foci of encephalomalacia including not limited to the corpus callosum genu and body (3:13). There is encephalomalacia with gliosis along the central and left paramedian goran, correlate with any prior sequela of remote ischemia (7:10, 6:9) with Wallerian degeneration  left cerebral peduncle, goran, and medullary pyramid (7:11-7). There is nonspecific foci of T2/FLAIR signal hyperintensity in the hemispheric white matter, likely microvascular disease, infectious and/or inflammatory etiologies in patient with COVID positivity not excluded with faint FLAIR hyperintensity noted along the bilateral olfactory gyri. There is a partially empty sella. No intraparenchymal hematoma, mass, extra-axial collection or midline shift. Basal cisterns remain patent.    Intact calvarium. Orbits and mastoids are unremarkable. Left maxillary mucosal thickening, with retention cyst and polyps, with mild ethmoid, frontal and sphenoid mucosal thickening.      Brain MRA:  Motion limited MRA,, calcified noncalcified plaque, causes multifocal stenosis bilateral cavernous and supraclinoid ICAs with 3.9 x 2.6 x 2.3 mm AP, TR, CC blister aneurysm extending inferiorly  right internal common carotid artery unchanged. There is flow-related signal  bilateral ICA cavernous and supraclinoid segments with  multifocal stenosis and poststenotic dilatation. Stenosis the right proximal right A1 and M1 segments (6:77). Redemonstration, stenosis and narrowing along the distal right M1, decreased right MCA branches, patent left A1, proximal A2 segments with multifocal stenosis anterior cerebral arteries. Stenosis distal left M1, multifocal stenosis distal left MCA branches, assessment limited by motion artifact.    Fetal bilateral PCAs, dominant anterior circulation, right vertebral artery crosses leftward, vertebral arteries are patent to vertebrobasilar junction, severe stenosis proximal basilar artery  above the vertebrobasilar junction with reconstitution distal basilar via P1 segments and fetal PCA's, multifocal stenosis PCA branch vessels.    Neck MRA:  The aortic arch and origins of the great vessels origins are patent..    Common and Internal Carotids: Tortuous bilateral common carotid arteries with retropharyngeal extension consistent with kissing carotids (5:35) no hemodynamically significant stenosis by NASCET criteria along origin of either right or left internal or external carotid artery.    Vertebral arteries:  Tortuous course and caliber of the bilateral vertebral arteries correlate with hypertension vessels are patent to intracranial V4 segments.    IMPRESSION:    Foci  restricted diffusion, DWI hyperintensity left inferior parietal lobe, no hemorrhagic transformation. Multifocal  T2 and FLAIR hyperintensity white matter may reflect microvascular disease,  additional etiologies not excluded with  COVIDpositivity.    Volume loss, multifocal encephalomalacia  corpus callosum body and splenium, Wallerian degeneration, left paramedian goran, medullary pyramid, no new hemorrhage or midline shift.    Severe flow limiting stenosis of the proximal basilar artery above the vertebral basilar junction with fetal origin PCAs, reconstitution of the distal basilar artery via the bilateral P1 segments.    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    Tortuous extracranial vessels likely from hypertension without hemodynamic significant stenosis at ICA origins by NASCET criteria. Tortuous vertebral arteries patent to V4 segments.    Findings discussed with Dr. Butler of neurology, at immediate time of review, 2021 at 9:10 AM.                JUDY NAILS MD; Attending Radiologist  This document has been electronically signed. 2021  9:14AM    < end of copied text >

## 2021-02-03 NOTE — PROGRESS NOTE ADULT - SUBJECTIVE AND OBJECTIVE BOX
Neurology Progress Note    S: Patient seen and examined in covid unit on airborn and contact isolation. patient denied CP, SOB, HA or pain. in bed on NC. no significant change     Medication:  MEDICATIONS  (STANDING):  atorvastatin 80 milliGRAM(s) Oral at bedtime  dexAMETHasone  Injectable 6 milliGRAM(s) IV Push daily  dextrose 40% Gel 15 Gram(s) Oral once  dextrose 5%. 1000 milliLiter(s) (50 mL/Hr) IV Continuous <Continuous>  dextrose 5%. 1000 milliLiter(s) (100 mL/Hr) IV Continuous <Continuous>  dextrose 50% Injectable 25 Gram(s) IV Push once  dextrose 50% Injectable 12.5 Gram(s) IV Push once  dextrose 50% Injectable 25 Gram(s) IV Push once  enoxaparin Injectable 100 milliGRAM(s) SubCutaneous two times a day  glucagon  Injectable 1 milliGRAM(s) IntraMuscular once  insulin glargine Injectable (LANTUS) 34 Unit(s) SubCutaneous at bedtime  insulin lispro (ADMELOG) corrective regimen sliding scale   SubCutaneous three times a day before meals  insulin lispro (ADMELOG) corrective regimen sliding scale   SubCutaneous at bedtime  insulin lispro Injectable (ADMELOG) 25 Unit(s) SubCutaneous three times a day before meals  polyethylene glycol 3350 17 Gram(s) Oral daily  remdesivir  IVPB 100 milliGRAM(s) IV Intermittent every 24 hours  remdesivir  IVPB   IV Intermittent     MEDICATIONS  (PRN):        Vitals:  ICU Vital Signs Last 24 Hrs  T(C): 36.8 (2021 04:44), Max: 37.3 (2021 20:21)  T(F): 98.2 (2021 04:44), Max: 99.2 (2021 20:21)  HR: 86 (2021 04:44) (80 - 92)  BP: 146/92 (2021 05:01) (144/86 - 160/99)  BP(mean): --  ABP: --  ABP(mean): --  RR: 20 (2021 04:44) (18 - 20)  SpO2: 92% (2021 04:44) (90% - 100%)      General Exam:   General Appearance: Appropriately dressed and in no acute distress       Head: Normocephalic, atraumatic and no dysmorphic features  Ear, Nose, and Throat: Moist mucous membranes  CVS: S1S2+  Resp: No SOB, no wheeze or rhonchi  Abd: soft NTND  Extremities: No edema, no cyanosis  Skin: No bruises, no rashes     Neurological Exam:  Mental Status: Awake, alert and oriented x 2-3.  Able to follow simple and complex verbal commands. Able to name and repeat. fluent speech. No obvious aphasia or dysarthria noted.   Cranial Nerves: PERRL, EOMI, VFFC, sensation V1-V3 intact,  mild R facial asymmetry , equal elevation of palate, scm/trap 5/5, tongue is midline on protrusion. no obvious papilledema on fundoscopic exam. Hearing is grossly intact.   Motor: RUE and RLE 4/5, LUE/LLE 5/5   Sensation: Intact to light touch and pinprick throughout.   Reflexes: 1+ throughout at biceps, brachioradialis, triceps, patellars and ankles bilaterally and equal. No clonus. R toe and L toe were both downgoing.  Coordination: No dysmetria on FNF   Gait: deferred     I personally reviewed the below data/images/labs:    CBC Full  -  ( 2021 05:52 )  WBC Count : 10.23 K/uL  RBC Count : 5.09 M/uL  Hemoglobin : 14.2 g/dL  Hematocrit : 44.0 %  Platelet Count - Automated : 185 K/uL  Mean Cell Volume : 86.4 fl  Mean Cell Hemoglobin : 27.9 pg  Mean Cell Hemoglobin Concentration : 32.3 gm/dL  Auto Neutrophil # : x  Auto Lymphocyte # : x  Auto Monocyte # : x  Auto Eosinophil # : x  Auto Basophil # : x  Auto Neutrophil % : x  Auto Lymphocyte % : x  Auto Monocyte % : x  Auto Eosinophil % : x  Auto Basophil % : x        143  |  104  |  19  ----------------------------<  272<H>  4.4   |  26  |  0.94    Ca    9.0      2021 05:52  Mg     2.0         TPro  6.4  /  Alb  3.5  /  TBili  0.3  /  DBili  x   /  AST  38  /  ALT  49<H>  /  AlkPhos  68  -    Urinalysis Basic - ( 2021 21:54 )    Color: Light Yellow / Appearance: Clear / S.036 / pH: x  Gluc: x / Ketone: Small  / Bili: Negative / Urobili: Negative   Blood: x / Protein: 30 mg/dL / Nitrite: Positive   Leuk Esterase: Small / RBC: 3 /hpf / WBC 10 /HPF   Sq Epi: x / Non Sq Epi: 1 /hpf / Bacteria: Many    Radiology:  -CT Head - unremarkable  -CTA head and neck 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    < from: MR Angio Neck w/wo IV Cont (21 @ 00:16) >    EXAM:  MR ANGIO BRAIN                          EXAM:  MR ANGIO NECK WAW IC                          EXAM:  MR BRAIN                            PROCEDURE DATE:  2021            INTERPRETATION:  CLINICAL INDICATION: COVID Positive, basilar stenosis, 70-year-old female with stroke, right-sided weakness and speech for 3 days, altered mental status,    Technique: Contrast brain MRI, non-contrast brain MRA and contrast neck MRA was performed.    Through the brain, sagittal and axial T1, axial T2, FLAIR, diffusion weighted images and an ADC map were obtained. Axial T1 post-contrast images were obtained and reconstructed in sagittal and coronal planes. 10 cc of intravenous gadolinium Gadavist gadolinium was administered and 0 discarded,  for contrast MRI brain and contrast enhanced MR angiography of the extracranial circulation.    MR angiography of intracranial and extracranial circulation was performed with time of flight imaging technique. Maximal intensity projection images were reviewed in multiple planes.    COMPARISON: Head CT, CT perfusion, CTA brain and neck, 2021    FINDINGS:  Brain MRI:  Foci restricted diffusion with hyperintense DWI, T2, FLAIR signal, left parietal lobe just above the occipital lobe (4:20, 400:20) concerning for new ischemia without hemorrhagic transformation. There is moderate enlargement of the ventricles and sulci greater than expected for age consistent with volume loss. There are foci of encephalomalacia including not limited to the corpus callosum genu and body (3:13). There is encephalomalacia with gliosis along the central and left paramedian goran, correlate with any prior sequela of remote ischemia (7:10, 6:9) with Wallerian degeneration  left cerebral peduncle, goran, and medullary pyramid (7:11-7). There is nonspecific foci of T2/FLAIR signal hyperintensity in the hemispheric white matter, likely microvascular disease, infectious and/or inflammatory etiologies in patient with COVID positivity not excluded with faint FLAIR hyperintensity noted along the bilateral olfactory gyri. There is a partially empty sella. No intraparenchymal hematoma, mass, extra-axial collection or midline shift. Basal cisterns remain patent.    Intact calvarium. Orbits and mastoids are unremarkable. Left maxillary mucosal thickening, with retention cyst and polyps, with mild ethmoid, frontal and sphenoid mucosal thickening.      Brain MRA:  Motion limited MRA,, calcified noncalcified plaque, causes multifocal stenosis bilateral cavernous and supraclinoid ICAs with 3.9 x 2.6 x 2.3 mm AP, TR, CC blister aneurysm extending inferiorly  right internal common carotid artery unchanged. There is flow-related signal  bilateral ICA cavernous and supraclinoid segments with  multifocal stenosis and poststenotic dilatation. Stenosis the right proximal right A1 and M1 segments (6:77). Redemonstration, stenosis and narrowing along the distal right M1, decreased right MCA branches, patent left A1, proximal A2 segments with multifocal stenosis anterior cerebral arteries. Stenosis distal left M1, multifocal stenosis distal left MCA branches, assessment limited by motion artifact.    Fetal bilateral PCAs, dominant anterior circulation, right vertebral artery crosses leftward, vertebral arteries are patent to vertebrobasilar junction, severe stenosis proximal basilar artery  above the vertebrobasilar junction with reconstitution distal basilar via P1 segments and fetal PCA's, multifocal stenosis PCA branch vessels.    Neck MRA:  The aortic arch and origins of the great vessels origins are patent..    Common and Internal Carotids: Tortuous bilateral common carotid arteries with retropharyngeal extension consistent with kissing carotids (5:35) no hemodynamically significant stenosis by NASCET criteria along origin of either right or left internal or external carotid artery.    Vertebral arteries:  Tortuous course and caliber of the bilateral vertebral arteries correlate with hypertension vessels are patent to intracranial V4 segments.    IMPRESSION:    Foci  restricted diffusion, DWI hyperintensity left inferior parietal lobe, no hemorrhagic transformation. Multifocal  T2 and FLAIR hyperintensity white matter may reflect microvascular disease,  additional etiologies not excluded with  COVIDpositivity.    Volume loss, multifocal encephalomalacia  corpus callosum body and splenium, Wallerian degeneration, left paramedian goran, medullary pyramid, no new hemorrhage or midline shift.    Severe flow limiting stenosis of the proximal basilar artery above the vertebral basilar junction with fetal origin PCAs, reconstitution of the distal basilar artery via the bilateral P1 segments.    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    Tortuous extracranial vessels likely from hypertension without hemodynamic significant stenosis at ICA origins by NASCET criteria. Tortuous vertebral arteries patent to V4 segments.    Findings discussed with Dr. Butler of neurology, at immediate time of review, 2021 at 9:10 AM.                JUDY NAILS MD; Attending Radiologist  This document has been electronically signed. 2021  9:14AM    < end of copied text >

## 2021-02-03 NOTE — PROGRESS NOTE ADULT - PROBLEM SELECTOR PLAN 2
- Currently above goal (Goal <130/80)  - Management per primary team    Problem 3: HLD  - Continue statin  -F/u lipids as out pt

## 2021-02-03 NOTE — PROGRESS NOTE ADULT - PROBLEM SELECTOR PLAN 1
- Hx of stroke in 2015 resulting in slurred speech, difficulty walking weakness on her right presenting with basilar stenosis with symptoms reminiscent of 2015 stroke (slurred speech and R sided weakness)  - Neurology follow up appreciated- suspected embolic stroke per neuro attending.  - Today sent antiphospholipid work up. Check TTE with bubble study.   - MRA consisted with 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 Plan to follow up with neuro outpatient- may benefit from angiogram in the future (outpatient).   - Per neuro, stopped DAPT (ASA and plavix) - started full dose lovenox 100mg BID; depending on APL work up may start NOAC.   - Continue Atorvastatin 80mg qHS, goal LDL<70 (currently 150)  - Monitor on telemetry  - Neuro checks q4 on the floor  - Permissive HTN (goal -180) Has been at goal.  - PT/OT eval - recommended for NAYAN (daughters agreeable)- would need to wait for 10 days of COVID to elapse (2/6/2021)  - Tolerating diet

## 2021-02-03 NOTE — PROGRESS NOTE ADULT - SUBJECTIVE AND OBJECTIVE BOX
[facility] Division of Hospital Medicine  Yovanny Almonte MD, ZELALEM  Pager (M-F, 8A-5P): [phone]  Other Times:  [phone]    Patient is a 70y old  Female who presents with a chief complaint of CVA (03 Feb 2021 13:34)      SUBJECTIVE / OVERNIGHT EVENTS:  No events overnight. No new complaints.     MEDICATIONS  (STANDING):  atorvastatin 80 milliGRAM(s) Oral at bedtime  dexAMETHasone  Injectable 6 milliGRAM(s) IV Push daily  enoxaparin Injectable 100 milliGRAM(s) SubCutaneous two times a day  glucagon  Injectable 1 milliGRAM(s) IntraMuscular once  insulin glargine Injectable (LANTUS) 44 Unit(s) SubCutaneous at bedtime  insulin lispro (ADMELOG) corrective regimen sliding scale   SubCutaneous <User Schedule>  insulin lispro (ADMELOG) corrective regimen sliding scale   SubCutaneous three times a day before meals  insulin lispro Injectable (ADMELOG) 35 Unit(s) SubCutaneous three times a day before meals  polyethylene glycol 3350 17 Gram(s) Oral daily  remdesivir  IVPB 100 milliGRAM(s) IV Intermittent every 24 hours  remdesivir  IVPB   IV Intermittent     MEDICATIONS  (PRN):  bisacodyl Suppository 10 milliGRAM(s) Rectal daily PRN Constipation    CAPILLARY BLOOD GLUCOSE  POCT Blood Glucose.: 473 mg/dL (03 Feb 2021 16:31)  POCT Blood Glucose.: 429 mg/dL (03 Feb 2021 16:30)  POCT Blood Glucose.: 484 mg/dL (03 Feb 2021 12:16)  POCT Blood Glucose.: 461 mg/dL (03 Feb 2021 12:15)  POCT Blood Glucose.: 286 mg/dL (03 Feb 2021 07:56)  POCT Blood Glucose.: 321 mg/dL (03 Feb 2021 00:23)  POCT Blood Glucose.: 430 mg/dL (02 Feb 2021 21:00)  POCT Blood Glucose.: 446 mg/dL (02 Feb 2021 21:00)    I&O's Summary    02 Feb 2021 07:01  -  03 Feb 2021 07:00  --------------------------------------------------------  IN: 300 mL / OUT: 1750 mL / NET: -1450 mL    03 Feb 2021 07:01  -  03 Feb 2021 19:10  --------------------------------------------------------  IN: 0 mL / OUT: 800 mL / NET: -800 mL        PHYSICAL EXAM:  Vital Signs Last 24 Hrs  T(C): 36.4 (03 Feb 2021 11:20), Max: 37.3 (02 Feb 2021 20:21)  T(F): 97.6 (03 Feb 2021 11:20), Max: 99.2 (02 Feb 2021 20:21)  HR: 90 (03 Feb 2021 12:28) (80 - 90)  BP: 148/91 (03 Feb 2021 12:28) (146/92 - 157/99)  BP(mean): --  RR: 20 (03 Feb 2021 12:28) (20 - 20)  SpO2: 90% (03 Feb 2021 12:28) (90% - 100%)    GENERAL: NAD, overweight  HEAD:  Atraumatic, Normocephalic  EYES: EOMI, conjunctiva and sclera clear  NECK: Supple, No JVD  CHEST/LUNG: Clear to auscultation bilaterally; No rales  HEART: rrr, S1, S2, no murmurs   ABDOMEN: Bowel sounds present; Soft, NT, ND.   EXTREMITIES:  No edema  NERVOUS SYSTEM:  AOx3, slower to answer with mild dysarthria noted, follows commands, 4/5 strength RUE and 3+/5 RLE, 5/5 strength LUE/LLE  MSK: no joint swelling         LABS:                        14.2   10.23 )-----------( 185      ( 03 Feb 2021 05:52 )             44.0     02-03    143  |  104  |  19  ----------------------------<  272<H>  4.4   |  26  |  0.94    Ca    9.0      03 Feb 2021 05:52  Mg     2.0     02-03    TPro  6.4  /  Alb  3.5  /  TBili  0.3  /  DBili  x   /  AST  38  /  ALT  49<H>  /  AlkPhos  68  02-03    PT/INR - ( 03 Feb 2021 07:31 )   PT: 14.3 sec;   INR: 1.21 ratio    PTT - ( 03 Feb 2021 07:31 )  PTT:41.2 sec        RADIOLOGY & ADDITIONAL TESTS:    Lab Results Reviewed   [facility] Division of Hospital Medicine  Yovanny Almonte MD, ZELALEM  Pager (M-F, 8A-5P): [phone]  Other Times:  [phone]    Patient is a 70y old  Female who presents with a chief complaint of CVA (03 Feb 2021 13:34)      SUBJECTIVE / OVERNIGHT EVENTS:  No events overnight. No new complaints.     MEDICATIONS  (STANDING):  atorvastatin 80 milliGRAM(s) Oral at bedtime  dexAMETHasone  Injectable 6 milliGRAM(s) IV Push daily  enoxaparin Injectable 100 milliGRAM(s) SubCutaneous two times a day  glucagon  Injectable 1 milliGRAM(s) IntraMuscular once  insulin glargine Injectable (LANTUS) 44 Unit(s) SubCutaneous at bedtime  insulin lispro (ADMELOG) corrective regimen sliding scale   SubCutaneous <User Schedule>  insulin lispro (ADMELOG) corrective regimen sliding scale   SubCutaneous three times a day before meals  insulin lispro Injectable (ADMELOG) 35 Unit(s) SubCutaneous three times a day before meals  polyethylene glycol 3350 17 Gram(s) Oral daily  remdesivir  IVPB 100 milliGRAM(s) IV Intermittent every 24 hours  remdesivir  IVPB   IV Intermittent     MEDICATIONS  (PRN):  bisacodyl Suppository 10 milliGRAM(s) Rectal daily PRN Constipation    CAPILLARY BLOOD GLUCOSE  POCT Blood Glucose.: 473 mg/dL (03 Feb 2021 16:31)  POCT Blood Glucose.: 429 mg/dL (03 Feb 2021 16:30)  POCT Blood Glucose.: 484 mg/dL (03 Feb 2021 12:16)  POCT Blood Glucose.: 461 mg/dL (03 Feb 2021 12:15)  POCT Blood Glucose.: 286 mg/dL (03 Feb 2021 07:56)  POCT Blood Glucose.: 321 mg/dL (03 Feb 2021 00:23)  POCT Blood Glucose.: 430 mg/dL (02 Feb 2021 21:00)  POCT Blood Glucose.: 446 mg/dL (02 Feb 2021 21:00)    I&O's Summary    02 Feb 2021 07:01  -  03 Feb 2021 07:00  --------------------------------------------------------  IN: 300 mL / OUT: 1750 mL / NET: -1450 mL    03 Feb 2021 07:01  -  03 Feb 2021 19:10  --------------------------------------------------------  IN: 0 mL / OUT: 800 mL / NET: -800 mL        PHYSICAL EXAM:  Vital Signs Last 24 Hrs  T(C): 36.4 (03 Feb 2021 11:20), Max: 37.3 (02 Feb 2021 20:21)  T(F): 97.6 (03 Feb 2021 11:20), Max: 99.2 (02 Feb 2021 20:21)  HR: 90 (03 Feb 2021 12:28) (80 - 90)  BP: 148/91 (03 Feb 2021 12:28) (146/92 - 157/99)  BP(mean): --  RR: 20 (03 Feb 2021 12:28) (20 - 20)  SpO2: 90% (03 Feb 2021 12:28) (90% - 100%)    GENERAL: NAD, overweight  HEAD:  Atraumatic, Normocephalic  EYES: EOMI, conjunctiva and sclera clear  NECK: Supple, No JVD  CHEST/LUNG: Clear to auscultation bilaterally; No rales  HEART: rrr, S1, S2, no murmurs   ABDOMEN: Bowel sounds present; Soft, NT, ND.   EXTREMITIES:  RLE 1+ pitting edema, LLE no edema  NERVOUS SYSTEM:  AOx3, slower to answer with mild dysarthria noted, follows commands, 4/5 strength RUE and 3+/5 RLE, 5/5 strength LUE/LLE  MSK: no joint swelling         LABS:                        14.2   10.23 )-----------( 185      ( 03 Feb 2021 05:52 )             44.0     02-03    143  |  104  |  19  ----------------------------<  272<H>  4.4   |  26  |  0.94    Ca    9.0      03 Feb 2021 05:52  Mg     2.0     02-03    TPro  6.4  /  Alb  3.5  /  TBili  0.3  /  DBili  x   /  AST  38  /  ALT  49<H>  /  AlkPhos  68  02-03    PT/INR - ( 03 Feb 2021 07:31 )   PT: 14.3 sec;   INR: 1.21 ratio    PTT - ( 03 Feb 2021 07:31 )  PTT:41.2 sec        RADIOLOGY & ADDITIONAL TESTS:    Lab Results Reviewed

## 2021-02-03 NOTE — PROGRESS NOTE ADULT - ASSESSMENT
69 yo F w/h/o uncontrolled DM2 (HbA1c 10.1). DM c/b neuropathy, CVA'15. Also HTN, HLD, chronic low back pain and sciatica. Transferred from [facility] for basilar artery stenosis, while in hospital found to have UTI and COVID-19. On Dexa for COVID  tx. Endocrine Team consulted for inpatient T2DM management. Reports tolerating POs with BG 200s to 400s in the last 24 hours while on present insulin doses. No hypoglycemia. Will increase all insulin doses to BG goal 100 to 200 while on steroids.     Spent 25 minutes assessing pt/labs/meds and discussing plan of care with primary team. Moderate complexity encounter on pt with uncontrolled T2DM with complications and comorbidities now with acute infection and steroids worsening glycemic control. Adjusting insulin.

## 2021-02-03 NOTE — PROGRESS NOTE ADULT - ASSESSMENT
70F with HTN, HLD, DMT2, sciatica, and history of stroke in 2015 resulting in slurred speech, difficulty walking and weakness on her right side, presenting as a transfer from [facility] (MR [id number]) for basilar artery stenosis in the setting of slurred speech and R-sided weakness. Transferred for possible endovascular intervention. Also admitted for sepsis 2/2 UTI and acute respiratory failure with hypoxia 2/2 COVID-19 infection.

## 2021-02-03 NOTE — PROGRESS NOTE ADULT - PROBLEM SELECTOR PLAN 3
- Due to COVID+, UTI -   - Completed ceftriaxone IV x 3 days for UTI (urine cultures with E.coli)  - Remdesivir day 4 of 5

## 2021-02-03 NOTE — CHART NOTE - NSCHARTNOTEFT_GEN_A_CORE
Seen for diet monitor ; regular texture / thin liquids.     Pt encountered awake/alert in bed; alert to person, situation, time and place. +3L NC. Repositioned upright for po intake. Offered pt thin liquids via cup and straw via single and serial sips in addition to regular texture.   Orientation/reception and ability to generate negative pressure for bolus extraction via straw was present. Adequate mastication/manipulation present however mildly prolonged. Pharyngeal phase c/b suspected mild delay in swallow function, however appearing functional with no s/s of aspiration with 4oz of liquids consumed. Pt encouraged to employ cup sips vs straw as pt with progressive WOB with straw use suspect 2/2 respiratory requirement, at this time. Pt presents with a mild benjy-pharyngeal dysphagia, however functional for continuation of regular textures and thin liquids w/ adherence to aspiration precautions. Purposeful proactive rounding reinforced and 5 Ps addressed. Pt left in no distress.     Recommendations:  1. Regular texture/ thin liquids  2. Slow rate   3. Encourage single sips and bites   4. Encourage sips via cup vs straw due to respiratory status   5. Monitor tolerance   6. Speech-Language Evaluation     Will sign off for swallowing     Dunia Narayanan MS CCC-SLP   Speech-Language Pathologist    pager [phone]

## 2021-02-03 NOTE — PROGRESS NOTE ADULT - ASSESSMENT
71 yo RH F with HTN, HLD, DM stroke 2016 with slurred speech and R weakness found to have COVID 19 PNA as well as UTI.  sent to [facility] as CTA demonstrates severe BA focal stenosis.  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  - given stroke and covid infection. would start full dose lovenox full dose lovenox x 1 month. send APLS labs including beta 2 glycoprotein, lupus anticoagulant and cardiolipin Abs.  If APLS labs negative can place on Eliquis 5mg BID x 1 month followed by ASA 81 and plavix 75mg daily.   - suggest diagnostic angio with INR Dr. Hernandez - multifocal stenosis including severe BA stenosis, blister aneurysm.  will likely defer given COVID status and unlikely to change current management. will need to schedule in future. f/u with neurosx  - high dose statin, lipitor 80mg daily  - treat infection  - on decadron  - avoid hypotension -180 .   - PT/OT  - check FS, glucose control <180  - GI/DVT ppx  - Counseling on diet, exercise, and medication adherence was done  - Counseling on smoking cessation and alcohol consumption offered when appropriate.  - Pain assessed and judicious use of narcotics when appropriate was discussed.    - Stroke education given when appropriate.  - Importance of fall prevention discussed.   - Differential diagnosis and plan of care discussed with patient and/or family and primary team  - Thank you for allowing me to participate in the care of this patient. Call with questions.   Naga De Leon MD  Vascular Neurology  Office: [phone]  71 yo RH F with HTN, HLD, DM stroke 2016 with slurred speech and R weakness found to have COVID 19 PNA as well as UTI.  sent to [facility] as CTA demonstrates severe BA focal stenosis.  A1c 10.1%, LDL 150mg/dL, MRI with L parietal infarct, MRA with severe BA stenosis and fetal PCAs and supraclinoid ICA blister aneurysm, also multifocal stenosis in  likely large artery athero. doubt vasculitis. no HA. Note Parietal infarct is anterior circulation not related to BA stenosis.   - trend inflammatory markers.  - given stroke and covid infection. would start full dose lovenox full dose lovenox x 1 month. send APLS labs including beta 2 glycoprotein, lupus anticoagulant and cardiolipin Abs.  If APLS labs negative can place on Eliquis 5mg BID x 1 month followed by ASA 81 and plavix 75mg daily.   - suggest diagnostic angio with INR Dr. Hernandez - multifocal stenosis including severe BA stenosis, blister aneurysm.  will likely defer given COVID status and unlikely to change current management. will need to schedule in future. f/u with neurosx  - high dose statin, lipitor 80mg daily  - treat infection  - on decadron  - avoid hypotension -180 .   - PT/OT  - check FS, glucose control <180  - GI/DVT ppx  - Counseling on diet, exercise, and medication adherence was done  - Counseling on smoking cessation and alcohol consumption offered when appropriate.  - Pain assessed and judicious use of narcotics when appropriate was discussed.    - Stroke education given when appropriate.  - Importance of fall prevention discussed.   - Differential diagnosis and plan of care discussed with patient and/or family and primary team  - Thank you for allowing me to participate in the care of this patient. Call with questions.   Naga De Leon MD  Vascular Neurology  Office: [phone]

## 2021-02-03 NOTE — PROGRESS NOTE ADULT - SUBJECTIVE AND OBJECTIVE BOX
DIABETES FOLLOW UP NOTE: Saw pt earlier today  INTERVAL HX: 71 yo F w/h/o uncontrolled DM2 (HbA1c 10.1). DM c/b neuropathy, CVA'15. Also HTN, HLD, chronic low back pain and sciatica. Transferred from [facility] for basilar artery stenosis, while in hospital found to have UTI and COVID-19. On Dexa for COVID  tx. Endocrine Team consulted for inpatient T2DM management. Reports tolerating POs with BG 200s to 400s in the last 24 hours while on present insulin doses. No hypoglycemia. States having some SOB, no cough. On O2.    Review of Systems:  General: As above  Cardiovascular: No chest pain, palpitations  Respiratory: + SOB, no cough  GI: No nausea, vomiting, abdominal pain  Endocrine: No polyuria, polydipsia or S&Sx of hypoglycemia    Allergies    No Known Allergies    Intolerances      MEDICATIONS:  atorvastatin 80 milliGRAM(s) Oral at bedtime  dexAMETHasone  Injectable 6 milliGRAM(s) IV Push daily  insulin glargine Injectable (LANTUS) 34 Unit(s) SubCutaneous at bedtime  insulin lispro (ADMELOG) corrective regimen sliding scale   SubCutaneous three times a day before meals  insulin lispro (ADMELOG) corrective regimen sliding scale   SubCutaneous at bedtime  insulin lispro Injectable (ADMELOG) 25 Unit(s) SubCutaneous three times a day before meals  remdesivir  IVPB 100 milliGRAM(s) IV Intermittent every 24 hours        PHYSICAL EXAM:  VITALS: T(C): 36.4 (02-03-21 @ 11:20)  T(F): 97.6 (02-03-21 @ 11:20), Max: 99.2 (02-02-21 @ 20:21)  HR: 90 (02-03-21 @ 12:28) (80 - 90)  BP: 148/91 (02-03-21 @ 12:28) (144/86 - 157/99)  RR:  (18 - 20)  SpO2:  (90% - 100%)  Wt(kg): --  GENERAL: Female sitting at edge of bed in NAD. PT at bedside  Abdomen: Soft, nontender, non distended, obese  Extremities: Warm, no edema in all 4 exts  NEURO: A&O X3    LABS:  POCT Blood Glucose.: 484 mg/dL (02-03-21 @ 12:16)  POCT Blood Glucose.: 461 mg/dL (02-03-21 @ 12:15)  POCT Blood Glucose.: 286 mg/dL (02-03-21 @ 07:56)  POCT Blood Glucose.: 321 mg/dL (02-03-21 @ 00:23)  POCT Blood Glucose.: 430 mg/dL (02-02-21 @ 21:00)  POCT Blood Glucose.: 446 mg/dL (02-02-21 @ 21:00)  POCT Blood Glucose.: 437 mg/dL (02-02-21 @ 16:38)  POCT Blood Glucose.: 458 mg/dL (02-02-21 @ 16:37)  POCT Blood Glucose.: 484 mg/dL (02-02-21 @ 13:31)  POCT Blood Glucose.: 470 mg/dL (02-02-21 @ 13:29)  POCT Blood Glucose.: 465 mg/dL (02-02-21 @ 11:47)  POCT Blood Glucose.: 459 mg/dL (02-02-21 @ 11:46)  POCT Blood Glucose.: 302 mg/dL (02-02-21 @ 08:08)  POCT Blood Glucose.: 435 mg/dL (02-01-21 @ 21:22)  POCT Blood Glucose.: 449 mg/dL (02-01-21 @ 21:22)  POCT Blood Glucose.: 403 mg/dL (02-01-21 @ 16:41)  POCT Blood Glucose.: 399 mg/dL (02-01-21 @ 16:41)  POCT Blood Glucose.: 341 mg/dL (02-01-21 @ 11:43)  POCT Blood Glucose.: 212 mg/dL (02-01-21 @ 07:47)  POCT Blood Glucose.: 467 mg/dL (01-31-21 @ 21:36)  POCT Blood Glucose.: 491 mg/dL (01-31-21 @ 16:35)  POCT Blood Glucose.: 385 mg/dL (01-31-21 @ 14:44)                            14.2   10.23 )-----------( 185      ( 03 Feb 2021 05:52 )             44.0       02-03    143  |  104  |  19  ----------------------------<  272<H>  4.4   |  26  |  0.94    EGFR if : 71      Ca    9.0      02-03  Mg     2.0     02-03    TPro  6.4  /  Alb  3.5  /  TBili  0.3  /  DBili  x   /  AST  38  /  ALT  49<H>  /  AlkPhos  68  02-03       A1C with Estimated Average Glucose Result: 10.1 % (01-31-21 @ 09:13)      Estimated Average Glucose: 243 mg/dL (01-31-21 @ 09:13)      01-31 Chol 227<H> LDL -- HDL 44<L> Trig 164<H>

## 2021-02-03 NOTE — PROGRESS NOTE ADULT - PROBLEM SELECTOR PLAN 1
- Test BG ac and hs  - Increase Lantus to 44  units sq qhs  - Increase Admelog to 35 units AC meals (Hold if patient is NPO) Will adjust as needed  - Moderate correctional scale before meals and qhs and 2am  - Appreciated RD consult   -Please contac endo team with changes on steropid therapy/PO  Discharge Plan:   - Patient may be able to be discharged on home regimen pending inpatient course and steroid taper. Doses TBD.  - Patient can f/u with Endocrine practice as follows  [address], [address]  [place] [address]  [phone]  -Needs Optho follow up  -Plan discussed with pt/team.  Contact info: [phone] (24/7). pager [phone]

## 2021-02-04 NOTE — PROGRESS NOTE ADULT - ASSESSMENT
70F with HTN, HLD, DMT2, sciatica, and history of stroke in 2015 resulting in slurred speech, difficulty walking and weakness on her right side, presenting as a transfer from [facility] (MR [id number]) for basilar artery stenosis in the setting of slurred speech and R-sided weakness. Transferred for possible endovascular intervention. Also admitted for sepsis 2/2 UTI and acute respiratory failure with hypoxia 2/2 COVID-19 infection.

## 2021-02-04 NOTE — PROGRESS NOTE ADULT - PROBLEM SELECTOR PLAN 1
- Hx of stroke in 2015 resulting in slurred speech, difficulty walking weakness on her right presenting with basilar stenosis with symptoms reminiscent of 2015 stroke (slurred speech and R sided weakness)  - Neurology follow up appreciated- suspected embolic stroke per neuro attending.  - Yesterday sent antiphospholipid work up. Check TTE with bubble study (ordered, pending).   - MRA consisted with 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 Plan to follow up with neuro outpatient- may benefit from angiogram in the future (outpatient).   - Per neuro, stopped DAPT (ASA and plavix) - started full dose lovenox 100mg BID; depending on APL work up may start NOAC.   - Continue Atorvastatin 80mg qHS, goal LDL<70 (currently 150)  - Monitor on telemetry  - Neuro checks q4 on the floor  - Permissive HTN (goal -180) Has been at goal.  - PT/OT eval - recommended for NAYAN (daughters agreeable)- would need to wait for 10 days of COVID to elapse (2/6/2021)  - Tolerating diet

## 2021-02-04 NOTE — PROGRESS NOTE ADULT - ASSESSMENT
71 yo RH F with HTN, HLD, DM stroke 2016 with slurred speech and R weakness found to have COVID 19 PNA as well as UTI.  sent to [facility] as CTA demonstrates severe BA focal stenosis.  A1c 10.1%, LDL 150mg/dL, MRI with L parietal infarct, MRA with severe BA stenosis and fetal PCAs and supraclinoid ICA blister aneurysm, also multifocal stenosis in  likely large artery athero. LA neg,  doubt vasculitis. no HA. Note Parietal infarct is anterior circulation not related to BA stenosis.   - given stroke and covid infection. c/w full dose lovenox full dose lovenox x 1 month.   - f/u APLS labs including beta 2 glycoprotein, lupus anticoagulant (negative) and cardiolipin Abs.  If APLS labs negative can place on Eliquis 5mg BID x 1 month followed by ASA 81 and plavix 75mg daily.  - suggest diagnostic angio with INR Dr. Hernandez - multifocal stenosis including severe BA stenosis, blister aneurysm.  will likely defer given COVID status and unlikely to change current management. will need to schedule in future. f/u with neurosx  - spoke with Nsx resident Dr. León requesting consult   - high dose statin, lipitor 80mg daily  - treat infection on remdesiver and decadron for covid infection  - monitor inflammatory markers.   - on decadron  - avoid hypotension -180 .   - PT/OT  - check FS, glucose control <180  - GI/DVT ppx  - Counseling on diet, exercise, and medication adherence was done  - Counseling on smoking cessation and alcohol consumption offered when appropriate.  - Pain assessed and judicious use of narcotics when appropriate was discussed.    - Stroke education given when appropriate.  - Importance of fall prevention discussed.   - Differential diagnosis and plan of care discussed with patient and/or family and primary team  - Thank you for allowing me to participate in the care of this patient. Call with questions.   Naga De Leon MD  Vascular Neurology  Office: [phone]

## 2021-02-04 NOTE — CONSULT NOTE ADULT - ASSESSMENT
70F PMH HTN, HLD, DM2 (A1C 10), history of CVA with residual R-sided hemiparesis, and sciatica who initially presented with several days of slurred speech and R-sided weakness increased from baseline, found to have COVID-19 + E. coli UTI. Now with worsening hypoxemia due to progressive COVID-19 pneumonia.    - Currently with saturation 92% on 100% NRB + NC@8LPM  - Consider changing to 50% venti mask. If remains hypoxemic, would start high flow nasal cannula 60 LPM 60% FiO2  - Goal SpO2>90%  - S/p 5-day course of remdesevir  - Complete 10-day course of dexamethasone  - On therapeutic enoxaparin given recent stroke + COVID-19 infection  - Please check D-dimer, CRP, ferritin, BNP, procalcitonin  - Screening sputum cultures, blood cultures, & nasal MRSA PCR  - Prognosis guarded, full code

## 2021-02-04 NOTE — PROGRESS NOTE ADULT - PROBLEM SELECTOR PLAN 3
- Due to COVID+, UTI -   - Completed ceftriaxone IV x 3 days for UTI (urine cultures with E.coli)  - Remdesivir day 5 of 5

## 2021-02-04 NOTE — PROGRESS NOTE ADULT - PROBLEM SELECTOR PLAN 4
- Currently on 15L NRB - consulted pulm to help with work up and management  - Likely in the setting of worsening COVID infection  - CXR opacities worsening. Unlikely PE (low d-dimer). Unlikely bacterial infection (low procalcitonin). Unlikely CHF (low proBNP).  - Give trial of lasix 20mg IV x1

## 2021-02-04 NOTE — PROGRESS NOTE ADULT - SUBJECTIVE AND OBJECTIVE BOX
[facility] Division of Hospital Medicine  Yovanny Almonte MD, ZELALEM  Pager (M-F, 8A-5P): [phone]  Other Times:  [phone]    Patient is a 70y old  Female who presents with a chief complaint of CVA (04 Feb 2021 15:04)      SUBJECTIVE / OVERNIGHT EVENTS:  The patient last night because acute more hypoxic. Otherwise the patient has no new complaints.     MEDICATIONS  (STANDING):  atorvastatin 80 milliGRAM(s) Oral at bedtime  dexAMETHasone  Injectable 6 milliGRAM(s) IV Push daily  dextrose 40% Gel 15 Gram(s) Oral once  dextrose 5%. 1000 milliLiter(s) (50 mL/Hr) IV Continuous <Continuous>  dextrose 5%. 1000 milliLiter(s) (100 mL/Hr) IV Continuous <Continuous>  dextrose 50% Injectable 25 Gram(s) IV Push once  dextrose 50% Injectable 12.5 Gram(s) IV Push once  dextrose 50% Injectable 25 Gram(s) IV Push once  enoxaparin Injectable 100 milliGRAM(s) SubCutaneous two times a day  glucagon  Injectable 1 milliGRAM(s) IntraMuscular once  insulin glargine Injectable (LANTUS) 48 Unit(s) SubCutaneous at bedtime  insulin lispro (ADMELOG) corrective regimen sliding scale   SubCutaneous three times a day before meals  insulin lispro (ADMELOG) corrective regimen sliding scale   SubCutaneous <User Schedule>  insulin lispro Injectable (ADMELOG) 42 Unit(s) SubCutaneous three times a day before meals  pantoprazole    Tablet 40 milliGRAM(s) Oral before breakfast  polyethylene glycol 3350 17 Gram(s) Oral daily    MEDICATIONS  (PRN):  bisacodyl Suppository 10 milliGRAM(s) Rectal daily PRN Constipation    CAPILLARY BLOOD GLUCOSE      POCT Blood Glucose.: 391 mg/dL (04 Feb 2021 16:38)  POCT Blood Glucose.: 471 mg/dL (04 Feb 2021 12:09)  POCT Blood Glucose.: 459 mg/dL (04 Feb 2021 12:08)  POCT Blood Glucose.: 195 mg/dL (04 Feb 2021 07:44)  POCT Blood Glucose.: 222 mg/dL (04 Feb 2021 02:02)  POCT Blood Glucose.: 412 mg/dL (03 Feb 2021 21:33)    I&O's Summary    03 Feb 2021 07:01  -  04 Feb 2021 07:00  --------------------------------------------------------  IN: 0 mL / OUT: 2250 mL / NET: -2250 mL    04 Feb 2021 07:01  -  04 Feb 2021 17:17  --------------------------------------------------------  IN: 0 mL / OUT: 1000 mL / NET: -1000 mL        PHYSICAL EXAM:  Vital Signs Last 24 Hrs  T(C): 36.3 (04 Feb 2021 12:40), Max: 36.4 (03 Feb 2021 20:14)  T(F): 97.3 (04 Feb 2021 12:40), Max: 97.5 (03 Feb 2021 20:14)  HR: 101 (04 Feb 2021 12:40) (75 - 101)  BP: 149/97 (04 Feb 2021 12:40) (144/88 - 151/100)  BP(mean): --  RR: 20 (04 Feb 2021 12:40) (20 - 22)  SpO2: 92% (04 Feb 2021 12:40) (91% - 92%)    GENERAL: NAD, comfortable on NRB, overweight  HEAD:  Atraumatic, Normocephalic  EYES: EOMI, conjunctiva and sclera clear  NECK: Supple, No JVD  CHEST/LUNG: Clear to auscultation bilaterally; No rales  HEART: rrr, S1, S2, no murmurs   ABDOMEN: Bowel sounds present; Soft, NT, ND.   EXTREMITIES:  no edema  NERVOUS SYSTEM:  AOx2 (not name of hospital), slower to answer with mild dysarthria noted, follows commands, 4/5 strength RUE and 3+/5 RLE, 5/5 strength LUE/LLE  MSK: no joint swelling       LABS:                        15.4   12.04 )-----------( 225      ( 04 Feb 2021 05:44 )             46.8     02-04    146<H>  |  105  |  22  ----------------------------<  167<H>  4.2   |  29  |  1.03    Ca    9.6      04 Feb 2021 05:44  Mg     2.0     02-03    TPro  6.7  /  Alb  3.5  /  TBili  0.4  /  DBili  x   /  AST  37  /  ALT  46<H>  /  AlkPhos  73  02-04    PT/INR - ( 03 Feb 2021 07:31 )   PT: 14.3 sec;   INR: 1.21 ratio         PTT - ( 03 Feb 2021 07:31 )  PTT:41.2 sec            RADIOLOGY & ADDITIONAL TESTS:    Lab Results Reviewed: proBNP normal, proCalcitonin not elevated, d-dimer low, CRP downtrending    Imaging Personally Reviewed: CXR- worsening pulmonary opacities.     COORDINATION OF CARE:  Care Discussed with Consultants/Other Providers:  Pulmonology re: worsening hypoxia

## 2021-02-04 NOTE — PROGRESS NOTE ADULT - SUBJECTIVE AND OBJECTIVE BOX
Neurology Progress Note    S: Patient seen and examined in covid unit on airborn and contact isolation. patient denied CP, SOB, HA or pain. in bed on NC. no significant change     Medication:  MEDICATIONS  (STANDING):  atorvastatin 80 milliGRAM(s) Oral at bedtime  dexAMETHasone  Injectable 6 milliGRAM(s) IV Push daily  dextrose 40% Gel 15 Gram(s) Oral once  dextrose 5%. 1000 milliLiter(s) (50 mL/Hr) IV Continuous <Continuous>  dextrose 5%. 1000 milliLiter(s) (100 mL/Hr) IV Continuous <Continuous>  dextrose 50% Injectable 25 Gram(s) IV Push once  dextrose 50% Injectable 12.5 Gram(s) IV Push once  dextrose 50% Injectable 25 Gram(s) IV Push once  enoxaparin Injectable 100 milliGRAM(s) SubCutaneous two times a day  glucagon  Injectable 1 milliGRAM(s) IntraMuscular once  insulin glargine Injectable (LANTUS) 44 Unit(s) SubCutaneous at bedtime  insulin lispro (ADMELOG) corrective regimen sliding scale   SubCutaneous three times a day before meals  insulin lispro (ADMELOG) corrective regimen sliding scale   SubCutaneous <User Schedule>  insulin lispro Injectable (ADMELOG) 35 Unit(s) SubCutaneous three times a day before meals  pantoprazole    Tablet 40 milliGRAM(s) Oral before breakfast  polyethylene glycol 3350 17 Gram(s) Oral daily  remdesivir  IVPB 100 milliGRAM(s) IV Intermittent every 24 hours  remdesivir  IVPB   IV Intermittent     MEDICATIONS  (PRN):  bisacodyl Suppository 10 milliGRAM(s) Rectal daily PRN Constipation          Vitals:  ICU Vital Signs Last 24 Hrs  T(C): 36.3 (04 Feb 2021 04:01), Max: 36.4 (03 Feb 2021 20:14)  T(F): 97.4 (04 Feb 2021 04:01), Max: 97.5 (03 Feb 2021 20:14)  HR: 89 (04 Feb 2021 04:01) (75 - 90)  BP: 151/100 (04 Feb 2021 04:01) (144/88 - 151/100)  BP(mean): --  ABP: --  ABP(mean): --  RR: 20 (04 Feb 2021 04:01) (20 - 22)  SpO2: 92% (04 Feb 2021 04:01) (90% - 92%)      General Exam:   General Appearance: Appropriately dressed and in no acute distress       Head: Normocephalic, atraumatic and no dysmorphic features  Ear, Nose, and Throat: Moist mucous membranes  CVS: S1S2+  Resp: No SOB, no wheeze or rhonchi  Abd: soft NTND  Extremities: No edema, no cyanosis  Skin: No bruises, no rashes     Neurological Exam:  Mental Status: Awake, alert and oriented x 2-3.  Able to follow simple and complex verbal commands. Able to name and repeat. fluent speech. No obvious aphasia or dysarthria noted.   Cranial Nerves: PERRL, EOMI, VFFC, sensation V1-V3 intact,  mild R facial asymmetry , equal elevation of palate, scm/trap 5/5, tongue is midline on protrusion. no obvious papilledema on fundoscopic exam. Hearing is grossly intact.   Motor: RUE and RLE 4/5, LUE/LLE 5/5   Sensation: Intact to light touch and pinprick throughout.   Reflexes: 1+ throughout at biceps, brachioradialis, triceps, patellars and ankles bilaterally and equal. No clonus. R toe and L toe were both downgoing.  Coordination: No dysmetria on FNF   Gait: deferred     I personally reviewed the below data/images/labs:    CBC Full  -  ( 04 Feb 2021 05:44 )  WBC Count : 12.04 K/uL  RBC Count : 5.42 M/uL  Hemoglobin : 15.4 g/dL  Hematocrit : 46.8 %  Platelet Count - Automated : 225 K/uL  Mean Cell Volume : 86.3 fl  Mean Cell Hemoglobin : 28.4 pg  Mean Cell Hemoglobin Concentration : 32.9 gm/dL  Auto Neutrophil # : x  Auto Lymphocyte # : x  Auto Monocyte # : x  Auto Eosinophil # : x  Auto Basophil # : x  Auto Neutrophil % : x  Auto Lymphocyte % : x  Auto Monocyte % : x  Auto Eosinophil % : x  Auto Basophil % : x    02-04    146<H>  |  105  |  22  ----------------------------<  167<H>  4.2   |  29  |  1.03    Ca    9.6      04 Feb 2021 05:44  Mg     2.0     02-03    TPro  6.7  /  Alb  3.5  /  TBili  0.4  /  DBili  x   /  AST  37  /  ALT  46<H>  /  AlkPhos  73  02-04      Radiology:  -CT Head - unremarkable  -CTA head and neck 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    < from: MR Angio Neck w/wo IV Cont (02.02.21 @ 00:16) >    EXAM:  MR ANGIO BRAIN                          EXAM:  MR ANGIO NECK WAW IC                          EXAM:  MR BRAIN                            PROCEDURE DATE:  02/02/2021            INTERPRETATION:  CLINICAL INDICATION: COVID Positive, basilar stenosis, 70-year-old female with stroke, right-sided weakness and speech for 3 days, altered mental status,    Technique: Contrast brain MRI, non-contrast brain MRA and contrast neck MRA was performed.    Through the brain, sagittal and axial T1, axial T2, FLAIR, diffusion weighted images and an ADC map were obtained. Axial T1 post-contrast images were obtained and reconstructed in sagittal and coronal planes. 10 cc of intravenous gadolinium Gadavist gadolinium was administered and 0 discarded,  for contrast MRI brain and contrast enhanced MR angiography of the extracranial circulation.    MR angiography of intracranial and extracranial circulation was performed with time of flight imaging technique. Maximal intensity projection images were reviewed in multiple planes.    COMPARISON: Head CT, CT perfusion, CTA brain and neck, 1/30/2021    FINDINGS:  Brain MRI:  Foci restricted diffusion with hyperintense DWI, T2, FLAIR signal, left parietal lobe just above the occipital lobe (4:20, 400:20) concerning for new ischemia without hemorrhagic transformation. There is moderate enlargement of the ventricles and sulci greater than expected for age consistent with volume loss. There are foci of encephalomalacia including not limited to the corpus callosum genu and body (3:13). There is encephalomalacia with gliosis along the central and left paramedian goran, correlate with any prior sequela of remote ischemia (7:10, 6:9) with Wallerian degeneration  left cerebral peduncle, goran, and medullary pyramid (7:11-7). There is nonspecific foci of T2/FLAIR signal hyperintensity in the hemispheric white matter, likely microvascular disease, infectious and/or inflammatory etiologies in patient with COVID positivity not excluded with faint FLAIR hyperintensity noted along the bilateral olfactory gyri. There is a partially empty sella. No intraparenchymal hematoma, mass, extra-axial collection or midline shift. Basal cisterns remain patent.    Intact calvarium. Orbits and mastoids are unremarkable. Left maxillary mucosal thickening, with retention cyst and polyps, with mild ethmoid, frontal and sphenoid mucosal thickening.      Brain MRA:  Motion limited MRA,, calcified noncalcified plaque, causes multifocal stenosis bilateral cavernous and supraclinoid ICAs with 3.9 x 2.6 x 2.3 mm AP, TR, CC blister aneurysm extending inferiorly  right internal common carotid artery unchanged. There is flow-related signal  bilateral ICA cavernous and supraclinoid segments with  multifocal stenosis and poststenotic dilatation. Stenosis the right proximal right A1 and M1 segments (6:77). Redemonstration, stenosis and narrowing along the distal right M1, decreased right MCA branches, patent left A1, proximal A2 segments with multifocal stenosis anterior cerebral arteries. Stenosis distal left M1, multifocal stenosis distal left MCA branches, assessment limited by motion artifact.    Fetal bilateral PCAs, dominant anterior circulation, right vertebral artery crosses leftward, vertebral arteries are patent to vertebrobasilar junction, severe stenosis proximal basilar artery  above the vertebrobasilar junction with reconstitution distal basilar via P1 segments and fetal PCA's, multifocal stenosis PCA branch vessels.    Neck MRA:  The aortic arch and origins of the great vessels origins are patent..    Common and Internal Carotids: Tortuous bilateral common carotid arteries with retropharyngeal extension consistent with kissing carotids (5:35) no hemodynamically significant stenosis by NASCET criteria along origin of either right or left internal or external carotid artery.    Vertebral arteries:  Tortuous course and caliber of the bilateral vertebral arteries correlate with hypertension vessels are patent to intracranial V4 segments.    IMPRESSION:    Foci  restricted diffusion, DWI hyperintensity left inferior parietal lobe, no hemorrhagic transformation. Multifocal  T2 and FLAIR hyperintensity white matter may reflect microvascular disease,  additional etiologies not excluded with  COVIDpositivity.    Volume loss, multifocal encephalomalacia  corpus callosum body and splenium, Wallerian degeneration, left paramedian goran, medullary pyramid, no new hemorrhage or midline shift.    Severe flow limiting stenosis of the proximal basilar artery above the vertebral basilar junction with fetal origin PCAs, reconstitution of the distal basilar artery via the bilateral P1 segments.    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    Tortuous extracranial vessels likely from hypertension without hemodynamic significant stenosis at ICA origins by NASCET criteria. Tortuous vertebral arteries patent to V4 segments.    Findings discussed with Dr. Butler of neurology, at immediate time of review, 2/2/2021 at 9:10 AM.                JUDY NAILS MD; Attending Radiologist  This document has been electronically signed. Feb 2 2021  9:14AM    < end of copied text >   Neurology Progress Note    S: Patient seen and examined in covid unit on airborn and contact isolation. patient denied CP, SOB, HA or pain. in bed on NC. no significant change  worsening resp status now on AM    Medication:  MEDICATIONS  (STANDING):  atorvastatin 80 milliGRAM(s) Oral at bedtime  dexAMETHasone  Injectable 6 milliGRAM(s) IV Push daily  dextrose 40% Gel 15 Gram(s) Oral once  dextrose 5%. 1000 milliLiter(s) (50 mL/Hr) IV Continuous <Continuous>  dextrose 5%. 1000 milliLiter(s) (100 mL/Hr) IV Continuous <Continuous>  dextrose 50% Injectable 25 Gram(s) IV Push once  dextrose 50% Injectable 12.5 Gram(s) IV Push once  dextrose 50% Injectable 25 Gram(s) IV Push once  enoxaparin Injectable 100 milliGRAM(s) SubCutaneous two times a day  glucagon  Injectable 1 milliGRAM(s) IntraMuscular once  insulin glargine Injectable (LANTUS) 44 Unit(s) SubCutaneous at bedtime  insulin lispro (ADMELOG) corrective regimen sliding scale   SubCutaneous three times a day before meals  insulin lispro (ADMELOG) corrective regimen sliding scale   SubCutaneous <User Schedule>  insulin lispro Injectable (ADMELOG) 35 Unit(s) SubCutaneous three times a day before meals  pantoprazole    Tablet 40 milliGRAM(s) Oral before breakfast  polyethylene glycol 3350 17 Gram(s) Oral daily  remdesivir  IVPB 100 milliGRAM(s) IV Intermittent every 24 hours  remdesivir  IVPB   IV Intermittent     MEDICATIONS  (PRN):  bisacodyl Suppository 10 milliGRAM(s) Rectal daily PRN Constipation          Vitals:  ICU Vital Signs Last 24 Hrs  T(C): 36.3 (04 Feb 2021 04:01), Max: 36.4 (03 Feb 2021 20:14)  T(F): 97.4 (04 Feb 2021 04:01), Max: 97.5 (03 Feb 2021 20:14)  HR: 89 (04 Feb 2021 04:01) (75 - 90)  BP: 151/100 (04 Feb 2021 04:01) (144/88 - 151/100)  BP(mean): --  ABP: --  ABP(mean): --  RR: 20 (04 Feb 2021 04:01) (20 - 22)  SpO2: 92% (04 Feb 2021 04:01) (90% - 92%)      General Exam:   General Appearance: Appropriately dressed and in no acute distress       Head: Normocephalic, atraumatic and no dysmorphic features  Ear, Nose, and Throat: Moist mucous membranes  CVS: S1S2+  Resp: No SOB, no wheeze or rhonchi  Abd: soft NTND  Extremities: No edema, no cyanosis  Skin: No bruises, no rashes     Neurological Exam:  Mental Status: Awake, alert and oriented x 2-3.  Able to follow simple and complex verbal commands. Able to name and repeat. fluent speech. No obvious aphasia or dysarthria noted.   Cranial Nerves: PERRL, EOMI, VFFC, sensation V1-V3 intact,  mild R facial asymmetry , equal elevation of palate, scm/trap 5/5, tongue is midline on protrusion. no obvious papilledema on fundoscopic exam. Hearing is grossly intact.   Motor: RUE and RLE 4/5, LUE/LLE 5/5   Sensation: Intact to light touch and pinprick throughout.   Reflexes: 1+ throughout at biceps, brachioradialis, triceps, patellars and ankles bilaterally and equal. No clonus. R toe and L toe were both downgoing.  Coordination: No dysmetria on FNF   Gait: deferred     I personally reviewed the below data/images/labs:    CBC Full  -  ( 04 Feb 2021 05:44 )  WBC Count : 12.04 K/uL  RBC Count : 5.42 M/uL  Hemoglobin : 15.4 g/dL  Hematocrit : 46.8 %  Platelet Count - Automated : 225 K/uL  Mean Cell Volume : 86.3 fl  Mean Cell Hemoglobin : 28.4 pg  Mean Cell Hemoglobin Concentration : 32.9 gm/dL  Auto Neutrophil # : x  Auto Lymphocyte # : x  Auto Monocyte # : x  Auto Eosinophil # : x  Auto Basophil # : x  Auto Neutrophil % : x  Auto Lymphocyte % : x  Auto Monocyte % : x  Auto Eosinophil % : x  Auto Basophil % : x    02-04    146<H>  |  105  |  22  ----------------------------<  167<H>  4.2   |  29  |  1.03    Ca    9.6      04 Feb 2021 05:44  Mg     2.0     02-03    TPro  6.7  /  Alb  3.5  /  TBili  0.4  /  DBili  x   /  AST  37  /  ALT  46<H>  /  AlkPhos  73  02-04      Radiology:  -CT Head - unremarkable  -CTA head and neck 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    < from: MR Angio Neck w/wo IV Cont (02.02.21 @ 00:16) >    EXAM:  MR ANGIO BRAIN                          EXAM:  MR ANGIO NECK WAW IC                          EXAM:  MR BRAIN                            PROCEDURE DATE:  02/02/2021            INTERPRETATION:  CLINICAL INDICATION: COVID Positive, basilar stenosis, 70-year-old female with stroke, right-sided weakness and speech for 3 days, altered mental status,    Technique: Contrast brain MRI, non-contrast brain MRA and contrast neck MRA was performed.    Through the brain, sagittal and axial T1, axial T2, FLAIR, diffusion weighted images and an ADC map were obtained. Axial T1 post-contrast images were obtained and reconstructed in sagittal and coronal planes. 10 cc of intravenous gadolinium Gadavist gadolinium was administered and 0 discarded,  for contrast MRI brain and contrast enhanced MR angiography of the extracranial circulation.    MR angiography of intracranial and extracranial circulation was performed with time of flight imaging technique. Maximal intensity projection images were reviewed in multiple planes.    COMPARISON: Head CT, CT perfusion, CTA brain and neck, 1/30/2021    FINDINGS:  Brain MRI:  Foci restricted diffusion with hyperintense DWI, T2, FLAIR signal, left parietal lobe just above the occipital lobe (4:20, 400:20) concerning for new ischemia without hemorrhagic transformation. There is moderate enlargement of the ventricles and sulci greater than expected for age consistent with volume loss. There are foci of encephalomalacia including not limited to the corpus callosum genu and body (3:13). There is encephalomalacia with gliosis along the central and left paramedian goran, correlate with any prior sequela of remote ischemia (7:10, 6:9) with Wallerian degeneration  left cerebral peduncle, goran, and medullary pyramid (7:11-7). There is nonspecific foci of T2/FLAIR signal hyperintensity in the hemispheric white matter, likely microvascular disease, infectious and/or inflammatory etiologies in patient with COVID positivity not excluded with faint FLAIR hyperintensity noted along the bilateral olfactory gyri. There is a partially empty sella. No intraparenchymal hematoma, mass, extra-axial collection or midline shift. Basal cisterns remain patent.    Intact calvarium. Orbits and mastoids are unremarkable. Left maxillary mucosal thickening, with retention cyst and polyps, with mild ethmoid, frontal and sphenoid mucosal thickening.      Brain MRA:  Motion limited MRA,, calcified noncalcified plaque, causes multifocal stenosis bilateral cavernous and supraclinoid ICAs with 3.9 x 2.6 x 2.3 mm AP, TR, CC blister aneurysm extending inferiorly  right internal common carotid artery unchanged. There is flow-related signal  bilateral ICA cavernous and supraclinoid segments with  multifocal stenosis and poststenotic dilatation. Stenosis the right proximal right A1 and M1 segments (6:77). Redemonstration, stenosis and narrowing along the distal right M1, decreased right MCA branches, patent left A1, proximal A2 segments with multifocal stenosis anterior cerebral arteries. Stenosis distal left M1, multifocal stenosis distal left MCA branches, assessment limited by motion artifact.    Fetal bilateral PCAs, dominant anterior circulation, right vertebral artery crosses leftward, vertebral arteries are patent to vertebrobasilar junction, severe stenosis proximal basilar artery  above the vertebrobasilar junction with reconstitution distal basilar via P1 segments and fetal PCA's, multifocal stenosis PCA branch vessels.    Neck MRA:  The aortic arch and origins of the great vessels origins are patent..    Common and Internal Carotids: Tortuous bilateral common carotid arteries with retropharyngeal extension consistent with kissing carotids (5:35) no hemodynamically significant stenosis by NASCET criteria along origin of either right or left internal or external carotid artery.    Vertebral arteries:  Tortuous course and caliber of the bilateral vertebral arteries correlate with hypertension vessels are patent to intracranial V4 segments.    IMPRESSION:    Foci  restricted diffusion, DWI hyperintensity left inferior parietal lobe, no hemorrhagic transformation. Multifocal  T2 and FLAIR hyperintensity white matter may reflect microvascular disease,  additional etiologies not excluded with  COVIDpositivity.    Volume loss, multifocal encephalomalacia  corpus callosum body and splenium, Wallerian degeneration, left paramedian goran, medullary pyramid, no new hemorrhage or midline shift.    Severe flow limiting stenosis of the proximal basilar artery above the vertebral basilar junction with fetal origin PCAs, reconstitution of the distal basilar artery via the bilateral P1 segments.    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    Tortuous extracranial vessels likely from hypertension without hemodynamic significant stenosis at ICA origins by NASCET criteria. Tortuous vertebral arteries patent to V4 segments.    Findings discussed with Dr. Butler of neurology, at immediate time of review, 2/2/2021 at 9:10 AM.                JUDY NAILS MD; Attending Radiologist  This document has been electronically signed. Feb 2 2021  9:14AM    < end of copied text >

## 2021-02-04 NOTE — PROGRESS NOTE ADULT - ASSESSMENT
69 yo F w/h/o uncontrolled DM2 (HbA1c 10.1). DM c/b neuropathy, CVA'15. Also HTN, HLD, chronic low back pain and sciatica. Transferred from [facility] for basilar artery stenosis, while in hospital found to have UTI and COVID-19. On Dexa for COVID  tx. Endocrine Team consulted for inpatient T2DM management. Reports tolerating POs with BG 200s to 400s in the last 24 hours while on present insulin doses. No hypoglycemia. Will increase all insulin doses to BG goal 100 to 200 while on steroids.     Spent 25 minutes assessing pt/labs/meds and discussing plan of care with primary team. Moderate complexity encounter on pt with uncontrolled T2DM with complications and comorbidities now with acute infection and steroids worsening glycemic control. Adjusting insulin.     1. Type 2 diabetes mellitus with hyperglycemia, with long-term current use of insulin.  Plan: - Test BG ac and hs  - Increase Lantus to 48 units sq qhs  - Increase Admelog to 35 --> 42 units AC meals (Hold if patient is NPO) Will adjust as needed  - Moderate correctional scale before meals and qhs and 2am  - Appreciated RD consult   -Please contact endo team with changes on steroid therapy/PO  -Discussed adjustment with nursing staff on team.     Discharge Plan:   - Patient may be able to be discharged on home regimen pending inpatient course and steroid taper. Doses TBD.  - Patient can f/u with Endocrine practice as follows  [address], [address]  [place] [address]  [phone]  -Needs Optho follow up  -Plan discussed with pt/team.  Contact info: [phone] (24/7). pager [phone].     2. Essential hypertension.   - Currently above goal (Goal <130/80)  - Management per primary team    3. HLD  - Continue statin, atorvastatin 80mg daily.

## 2021-02-04 NOTE — PROGRESS NOTE ADULT - SUBJECTIVE AND OBJECTIVE BOX
Contact info:   Raj Villela MD  pager [phone], please provide 10 digit call back number.   Please note that this patient may be followed by another provider tomorrow.   If no answer or after hours, please contact [phone]    History:    Interval History/Subjective:    MEDICATIONS  (STANDING):  atorvastatin 80 milliGRAM(s) Oral at bedtime  dexAMETHasone  Injectable 6 milliGRAM(s) IV Push daily  dextrose 40% Gel 15 Gram(s) Oral once  dextrose 5%. 1000 milliLiter(s) (50 mL/Hr) IV Continuous <Continuous>  dextrose 5%. 1000 milliLiter(s) (100 mL/Hr) IV Continuous <Continuous>  dextrose 50% Injectable 25 Gram(s) IV Push once  dextrose 50% Injectable 12.5 Gram(s) IV Push once  dextrose 50% Injectable 25 Gram(s) IV Push once  enoxaparin Injectable 100 milliGRAM(s) SubCutaneous two times a day  glucagon  Injectable 1 milliGRAM(s) IntraMuscular once  insulin glargine Injectable (LANTUS) 44 Unit(s) SubCutaneous at bedtime  insulin lispro (ADMELOG) corrective regimen sliding scale   SubCutaneous three times a day before meals  insulin lispro (ADMELOG) corrective regimen sliding scale   SubCutaneous <User Schedule>  insulin lispro Injectable (ADMELOG) 35 Unit(s) SubCutaneous three times a day before meals  pantoprazole    Tablet 40 milliGRAM(s) Oral before breakfast  polyethylene glycol 3350 17 Gram(s) Oral daily    MEDICATIONS  (PRN):  bisacodyl Suppository 10 milliGRAM(s) Rectal daily PRN Constipation      Allergies    No Known Allergies    Intolerances      Review of Systems:  Constitutional: No fever  Eyes: No blurry vision  Neuro: No tremors  HEENT: No pain  Cardiovascular: No chest pain, palpitations  Respiratory: No SOB, no cough  GI: No nausea, vomiting, abdominal pain  : No dysuria  Skin: no rash  Psych: no depression  Endocrine: no polyuria, polydipsia  Hem/lymph: no swelling    ALL OTHER SYSTEMS REVIEWED AND NEGATIVE    PHYSICAL EXAM:  VITALS: T(C): 36.3 (02-04-21 @ 12:40)  T(F): 97.3 (02-04-21 @ 12:40), Max: 97.5 (02-03-21 @ 20:14)  HR: 101 (02-04-21 @ 12:40) (75 - 101)  BP: 149/97 (02-04-21 @ 12:40) (144/88 - 151/100)  RR:  (20 - 22)  SpO2:  (91% - 92%)  Wt(kg): --  GENERAL: NAD, well-groomed, well-developed  EYES: No proptosis, no lid lag, anicteric  HEENT:  Atraumatic, Normocephalic, moist mucous membranes  THYROID: Normal size, no palpable nodules  RESPIRATORY: Clear to auscultation bilaterally; No rales, rhonchi, wheezing, or rubs  CARDIOVASCULAR: Regular rate and rhythm; No murmurs; no peripheral edema  GI: Soft, nontender, non distended, normal bowel sounds  SKIN: Dry, intact, No rashes or lesions  MUSCULOSKELETAL: Full range of motion, normal strength  NEURO: sensation intact, extraocular movements intact, no tremor, normal reflexes  PSYCH: Alert and oriented x 3, normal affect, normal mood  CUSHING'S SIGNS: no striae    POCT Blood Glucose.: 471 mg/dL (02-04-21 @ 12:09)  POCT Blood Glucose.: 459 mg/dL (02-04-21 @ 12:08)  POCT Blood Glucose.: 195 mg/dL (02-04-21 @ 07:44)  POCT Blood Glucose.: 222 mg/dL (02-04-21 @ 02:02)  POCT Blood Glucose.: 412 mg/dL (02-03-21 @ 21:33)  POCT Blood Glucose.: 473 mg/dL (02-03-21 @ 16:31)  POCT Blood Glucose.: 429 mg/dL (02-03-21 @ 16:30)  POCT Blood Glucose.: 484 mg/dL (02-03-21 @ 12:16)  POCT Blood Glucose.: 461 mg/dL (02-03-21 @ 12:15)  POCT Blood Glucose.: 286 mg/dL (02-03-21 @ 07:56)  POCT Blood Glucose.: 321 mg/dL (02-03-21 @ 00:23)  POCT Blood Glucose.: 430 mg/dL (02-02-21 @ 21:00)  POCT Blood Glucose.: 446 mg/dL (02-02-21 @ 21:00)  POCT Blood Glucose.: 437 mg/dL (02-02-21 @ 16:38)  POCT Blood Glucose.: 458 mg/dL (02-02-21 @ 16:37)  POCT Blood Glucose.: 484 mg/dL (02-02-21 @ 13:31)  POCT Blood Glucose.: 470 mg/dL (02-02-21 @ 13:29)  POCT Blood Glucose.: 465 mg/dL (02-02-21 @ 11:47)  POCT Blood Glucose.: 459 mg/dL (02-02-21 @ 11:46)  POCT Blood Glucose.: 302 mg/dL (02-02-21 @ 08:08)  POCT Blood Glucose.: 435 mg/dL (02-01-21 @ 21:22)  POCT Blood Glucose.: 449 mg/dL (02-01-21 @ 21:22)  POCT Blood Glucose.: 403 mg/dL (02-01-21 @ 16:41)  POCT Blood Glucose.: 399 mg/dL (02-01-21 @ 16:41)      02-04    146<H>  |  105  |  22  ----------------------------<  167<H>  4.2   |  29  |  1.03    EGFR if : 64  EGFR if non : 55<L>    Ca    9.6      02-04  Mg     2.0     02-03    TPro  6.7  /  Alb  3.5  /  TBili  0.4  /  DBili  x   /  AST  37  /  ALT  46<H>  /  AlkPhos  73  02-04        Thyroid Function Tests:                     Raj Villela MD  Pager [phone], short range [phone]  Please provide 10 digit call back number if any questions.    For after hours or weekends please call [phone] or page fellow on call.      Interval History/Subjective: Patient is on non-rebreather, feels well.  Reports good appetite.  Glucose has been significantly elevated since she's on dexamethasone (started Feb 1st, 2021)      MEDICATIONS  (STANDING):  atorvastatin 80 milliGRAM(s) Oral at bedtime  dexAMETHasone  Injectable 6 milliGRAM(s) IV Push daily  dextrose 40% Gel 15 Gram(s) Oral once  dextrose 5%. 1000 milliLiter(s) (50 mL/Hr) IV Continuous <Continuous>  dextrose 5%. 1000 milliLiter(s) (100 mL/Hr) IV Continuous <Continuous>  dextrose 50% Injectable 25 Gram(s) IV Push once  dextrose 50% Injectable 12.5 Gram(s) IV Push once  dextrose 50% Injectable 25 Gram(s) IV Push once  enoxaparin Injectable 100 milliGRAM(s) SubCutaneous two times a day  glucagon  Injectable 1 milliGRAM(s) IntraMuscular once  insulin glargine Injectable (LANTUS) 44 Unit(s) SubCutaneous at bedtime  insulin lispro (ADMELOG) corrective regimen sliding scale   SubCutaneous three times a day before meals  insulin lispro (ADMELOG) corrective regimen sliding scale   SubCutaneous <User Schedule>  insulin lispro Injectable (ADMELOG) 35 Unit(s) SubCutaneous three times a day before meals  pantoprazole    Tablet 40 milliGRAM(s) Oral before breakfast  polyethylene glycol 3350 17 Gram(s) Oral daily    MEDICATIONS  (PRN):  bisacodyl Suppository 10 milliGRAM(s) Rectal daily PRN Constipation      Allergies    No Known Allergies    Intolerances      Review of Systems:  Constitutional: No fever  Eyes: No blurry vision  Neuro: No tremors  HEENT: No pain  Cardiovascular: No chest pain, palpitations  Respiratory: +SOB, no cough  GI: No nausea, vomiting, abdominal pain  : No dysuria  Skin: no rash  Psych: no depression  Endocrine: no polyuria, polydipsia  Hem/lymph: no swelling    ALL OTHER SYSTEMS REVIEWED AND NEGATIVE    PHYSICAL EXAM:  VITALS: T(C): 36.3 (02-04-21 @ 12:40)  T(F): 97.3 (02-04-21 @ 12:40), Max: 97.5 (02-03-21 @ 20:14)  HR: 101 (02-04-21 @ 12:40) (75 - 101)  BP: 149/97 (02-04-21 @ 12:40) (144/88 - 151/100)  RR:  (20 - 22)  SpO2:  (91% - 92%)  Wt(kg): --  GENERAL: NAD, well-groomed, well-developed  EYES: No proptosis, no lid lag, anicteric  HEENT:  On non-rebreather.   GI: Soft, nontender, non distended, normal bowel sounds  SKIN: Dry, intact, No rashes or lesions  MUSCULOSKELETAL: Full range of motion, normal strength  NEURO: sensation intact, extraocular movements intact, no tremor, normal reflexes  PSYCH: Alert and oriented x 3, normal affect, normal mood  CUSHING'S SIGNS: no striae    POCT Blood Glucose.: 471 mg/dL (02-04-21 @ 12:09)  POCT Blood Glucose.: 459 mg/dL (02-04-21 @ 12:08)  POCT Blood Glucose.: 195 mg/dL (02-04-21 @ 07:44)  POCT Blood Glucose.: 222 mg/dL (02-04-21 @ 02:02)  POCT Blood Glucose.: 412 mg/dL (02-03-21 @ 21:33)  POCT Blood Glucose.: 473 mg/dL (02-03-21 @ 16:31)  POCT Blood Glucose.: 429 mg/dL (02-03-21 @ 16:30)  POCT Blood Glucose.: 484 mg/dL (02-03-21 @ 12:16)  POCT Blood Glucose.: 461 mg/dL (02-03-21 @ 12:15)  POCT Blood Glucose.: 286 mg/dL (02-03-21 @ 07:56)  POCT Blood Glucose.: 321 mg/dL (02-03-21 @ 00:23)  POCT Blood Glucose.: 430 mg/dL (02-02-21 @ 21:00)  POCT Blood Glucose.: 446 mg/dL (02-02-21 @ 21:00)  POCT Blood Glucose.: 437 mg/dL (02-02-21 @ 16:38)  POCT Blood Glucose.: 458 mg/dL (02-02-21 @ 16:37)  POCT Blood Glucose.: 484 mg/dL (02-02-21 @ 13:31)  POCT Blood Glucose.: 470 mg/dL (02-02-21 @ 13:29)  POCT Blood Glucose.: 465 mg/dL (02-02-21 @ 11:47)  POCT Blood Glucose.: 459 mg/dL (02-02-21 @ 11:46)  POCT Blood Glucose.: 302 mg/dL (02-02-21 @ 08:08)  POCT Blood Glucose.: 435 mg/dL (02-01-21 @ 21:22)  POCT Blood Glucose.: 449 mg/dL (02-01-21 @ 21:22)  POCT Blood Glucose.: 403 mg/dL (02-01-21 @ 16:41)  POCT Blood Glucose.: 399 mg/dL (02-01-21 @ 16:41)      02-04    146<H>  |  105  |  22  ----------------------------<  167<H>  4.2   |  29  |  1.03    EGFR if : 64  EGFR if non : 55<L>    Ca    9.6      02-04  Mg     2.0     02-03    TPro  6.7  /  Alb  3.5  /  TBili  0.4  /  DBili  x   /  AST  37  /  ALT  46<H>  /  AlkPhos  73  02-04        Thyroid Function Tests:

## 2021-02-04 NOTE — CONSULT NOTE ADULT - SUBJECTIVE AND OBJECTIVE BOX
[facility] - Division of Pulmonary, Critical Care and Sleep Medicine   Please call [phone] between 8am-pm weekdays, [phone] after hours and weekends      CHIEF COMPLAINT: stroke-like symptoms    HPI:  70F PMH HTN, HLD, DM2 (A1C 10), history of CVA with residual R-sided hemiparesis, and sciatica who initially presented with several days of slurred speech and R-sided weakness increased from baseline. She was found to be COVID positive. Imaging revealing for basilar artery stenosis. She was further found to have E. coli UTI s/p ceftriaxone. She was also treated with Remdesevir and is completing a 10-day course of dexamethasone. Her aspirin and clopidogrel were discontinued and she was started on enoxaparin given recurrent stroke and COVID infection.    Today, she has worsening hypoxemia. Currently she is on 8L NC + 100% NRB with saturation of 92%. She denies any dyspnea, fevers, or chills, but does report a cough occasionally productive of yellow-green phlegm.     PAST MEDICAL & SURGICAL HISTORY:  Cerebrovascular accident (CVA) 2015  Chronic low back pain, unspecified back pain laterality, with sciatica  Diabetes mellitus  HLD (hyperlipidemia)  HTN (hypertension)  Sciatica, unspecified laterality  Type 2 diabetes mellitus with other specified complication, unspecified whether long term insulin use  No significant past surgical history    FAMILY HISTORY:  Family history of blood clots    SOCIAL HISTORY: former smoker, quit 10 years ago, reports smoking 1/2 ppd for 10 years. Former marijuana use, but no current illicit drug use. No alcohol use    Allergies: No Known Allergies    HOME MEDICATIONS: aspirin, Basaglar, Novolog, metformin, amlodipine, tradjenta    REVIEW OF SYSTEMS:  Constitutional: [ ] fevers [ ] chills [ ] weight loss [ ] weight gain  HEENT: [ ] dry eyes [ ] eye irritation [ ] postnasal drip [ ] nasal congestion  CV: [ ] chest pain [ ] orthopnea [ ] palpitations [ ] murmur  Resp: [x ] cough [ ] shortness of breath [ ] wheezing [ ] sputum [ ] hemoptysis  GI: [ ] nausea [ ] vomiting [ ] diarrhea [ ] constipation [ ] abd pain [ ] dysphagia   : [ ] dysuria [ ] nocturia [ ] hematuria [ ] increased urinary frequency  Musculoskeletal: [ ] myalgias [ ] arthralgias   Skin: [ ] rash [ ] itch  Neurological: [ ] headache [ ] dizziness [ ] syncope [x ] weakness [ ] numbness  Psychiatric: [ ] anxiety [ ] depression  Endocrine: [x ] diabetes [ ] thyroid problem  Hematologic/Lymphatic: [ ] anemia [ ] bleeding problem  Allergic/Immunologic: [ ] itchy eyes [ ] nasal discharge [ ] hives [ ] angioedema    OBJECTIVE:  ICU Vital Signs Last 24 Hrs  T(C): 36.3 (04 Feb 2021 12:40), Max: 36.4 (03 Feb 2021 20:14)  T(F): 97.3 (04 Feb 2021 12:40), Max: 97.5 (03 Feb 2021 20:14)  HR: 101 (04 Feb 2021 12:40) (75 - 101)  BP: 149/97 (04 Feb 2021 12:40) (144/88 - 151/100)  RR: 20 (04 Feb 2021 12:40) (20 - 22)  SpO2: 92% (04 Feb 2021 12:40) (91% - 92%)    02-03 @ 07:01  -  02-04 @ 07:00  --------------------------------------------------------  IN: 0 mL / OUT: 2250 mL / NET: -2250 mL    POCT Blood Glucose.: 471 mg/dL (04 Feb 2021 12:09)    PHYSICAL EXAM:  General:  NAD; AAOx3  Neuro: decreased strength in RUE/RLE; strength intact on left  HEENT: NC/AT; EOMI; MMM  CV: normal S1 & S2; regular rate and rhythm   Pulm: inspiratory rales bilaterally  GI: soft; NT/ND  Ext: no edema; pulses intact  Skin: warm, well perfused    HOSPITAL MEDICATIONS:  MEDICATIONS  (STANDING):  atorvastatin 80 milliGRAM(s) Oral at bedtime  dexAMETHasone  Injectable 6 milliGRAM(s) IV Push daily  enoxaparin Injectable 100 milliGRAM(s) SubCutaneous two times a day  glucagon  Injectable 1 milliGRAM(s) IntraMuscular once  insulin glargine Injectable (LANTUS) 48 Unit(s) SubCutaneous at bedtime  insulin lispro (ADMELOG) corrective regimen sliding scale   SubCutaneous three times a day before meals  insulin lispro (ADMELOG) corrective regimen sliding scale   SubCutaneous <User Schedule>  insulin lispro Injectable (ADMELOG) 42 Unit(s) SubCutaneous three times a day before meals  pantoprazole    Tablet 40 milliGRAM(s) Oral before breakfast  polyethylene glycol 3350 17 Gram(s) Oral daily    MEDICATIONS  (PRN):  bisacodyl Suppository 10 milliGRAM(s) Rectal daily PRN Constipation      LABS:                        15.4   12.04 )-----------( 225      ( 04 Feb 2021 05:44 )             46.8     Hgb Trend: 15.4<--, 14.2<--, 14.3<--, 14.3<--, 13.8<--  02-04    146<H>  |  105  |  22  ----------------------------<  167<H>  4.2   |  29  |  1.03    Ca    9.6      04 Feb 2021 05:44  Mg     2.0     02-03    TPro  6.7  /  Alb  3.5  /  TBili  0.4  /  DBili  x   /  AST  37  /  ALT  46<H>  /  AlkPhos  73  02-04    Creatinine Trend: 1.03<--, 0.94<--, 0.83<--, 1.03<--, 0.98<--, 0.91<--  PT/INR - ( 03 Feb 2021 07:31 )   PT: 14.3 sec;   INR: 1.21 ratio         PTT - ( 03 Feb 2021 07:31 )  PTT:41.2 sec    Arterial Blood Gas:  02-03 @ 21:44  7.42/41/84/26/95/1.9  ABG lactate: --    Blood cultures (1/31): no growth in all 4 bottles    Urine culture (1/31): E. coli, sensitive to CTX    CXR (2/4): bilateral pulmonary opacities increased from prior study with stable elevation of right hemidiaphragm. No pleural effusion or pneumothorax    Right Lower Extremity Duplex: no evidence of DVT

## 2021-02-04 NOTE — PROGRESS NOTE ADULT - PROBLEM SELECTOR PLAN 5
- Diagnosed 1/30  - C/w decadron and remdesivir (day 4)  - Oxygen as above  - Monitor LFTs while on remdesivir   - Trend inflammatory markers (improving)

## 2021-02-05 NOTE — PROGRESS NOTE ADULT - ASSESSMENT
69 yo RH F with HTN, HLD, DM stroke 2016 with slurred speech and R weakness found to have COVID 19 PNA as well as UTI.  sent to [facility] as CTA demonstrates severe BA focal stenosis.  A1c 10.1%, LDL 150mg/dL, MRI with L parietal infarct, MRA with severe BA stenosis and fetal PCAs and supraclinoid ICA blister aneurysm, also multifocal stenosis in  likely large artery athero. LA neg,  doubt vasculitis. no HA. Note Parietal infarct is anterior circulation not related to BA stenosis. 90% on NRBM D dimer low.   - consider r/o PE  - given stroke and covid infection. c/w full dose lovenox full dose lovenox x 1 month.   - f/u APLS labs including beta 2 glycoprotein, lupus anticoagulant (negative) and cardiolipin Abs.  If APLS labs negative can place on Eliquis 5mg BID x 1 month followed by ASA 81 and plavix 75mg daily.  - suggest diagnostic angio with INR Dr. Hernandez - multifocal stenosis including severe BA stenosis, blister aneurysm.  will likely defer given COVID status and unlikely to change current management. will need to schedule in future. f/u with neurosx  - spoke with N[facility] resident Dr. León requesting consult   - high dose statin, lipitor 80mg daily  - treat infection on remdesiver and decadron for covid infection  - monitor inflammatory markers.   - on decadron  - avoid hypotension -180 .   - PT/OT  - check FS, glucose control <180  - GI/DVT ppx  - Counseling on diet, exercise, and medication adherence was done  - Counseling on smoking cessation and alcohol consumption offered when appropriate.  - Pain assessed and judicious use of narcotics when appropriate was discussed.    - Stroke education given when appropriate.  - Importance of fall prevention discussed.   - Differential diagnosis and plan of care discussed with patient and/or family and primary team  - Thank you for allowing me to participate in the care of this patient. Call with questions.   Naga De Leon MD  Vascular Neurology  Office: [phone]

## 2021-02-05 NOTE — PROGRESS NOTE ADULT - ASSESSMENT
70F with HTN, HLD, DMT2, sciatica, and history of stroke in 2015 resulting in slurred speech, difficulty walking and weakness on her right side, presenting as a transfer from [facility] (MR [id number]) for basilar artery stenosis in the setting of slurred speech and R-sided weakness. Transferred for possible endovascular intervention. Also admitted for sepsis 2/2 UTI and acute respiratory failure with hypoxia 2/2 COVID-19 infection.

## 2021-02-05 NOTE — PROGRESS NOTE ADULT - SUBJECTIVE AND OBJECTIVE BOX
[facility] - Division of Pulmonary, Critical Care and Sleep Medicine   Please call [phone] between 8am-pm weekdays, [phone] after hours and weekends    Interval Events: developed worsening hypoxemia this morning on 100% NRB, now on high flow nasal cannula 60 LPM, 100% FiO2. Denies any dyspnea, chest pain, fevers, chills, or cough.    REVIEW OF SYSTEMS:  Constitutional: no fever, chills, fatigue  Neuro: no headache, numbness, weakness  Resp: denies cough, wheezing, shortness of breath  CVS: no chest pain, palpitations, leg swelling  GI: no abdominal pain, nausea, vomiting, diarrhea   : no dysuria, frequency, incontinence  Skin: no itching, burning, rashes, or lesions   Msk: no joint pain or swelling  Psych: no depression, anxiety    OBJECTIVE:  ICU Vital Signs Last 24 Hrs  T(C): 36.6 (05 Feb 2021 07:30), Max: 36.6 (05 Feb 2021 07:30)  T(F): 97.9 (05 Feb 2021 07:30), Max: 97.9 (05 Feb 2021 07:30)  HR: 92 (05 Feb 2021 10:18) (92 - 101)  BP: 135/87 (05 Feb 2021 07:30) (135/87 - 158/98)  RR: 32 (05 Feb 2021 07:49) (18 - 32)  SpO2: 91% (05 Feb 2021 10:18) (87% - 92%)    02-04 @ 07:01  -  02-05 @ 07:00  --------------------------------------------------------  IN: 0 mL / OUT: 2850 mL / NET: -2850 mL    POCT Blood Glucose.: 152 mg/dL (05 Feb 2021 07:51)    PHYSICAL EXAM:  Gen: NAD; AAOx3  Neuro: CN II-XII grossly intact; moving all extremities but with decreased strength in RUE/RLE; strength intact on left  HEENT: NC/AT; EOMI; MMM  CV: normal S1 & S2; regular rate and rhythm   Pulm: inspiratory rales bilaterally  GI: soft; NT/ND  Ext: no edema; pulses intact  Skin: warm, well perfused    HOSPITAL MEDICATIONS:  MEDICATIONS  (STANDING):  atorvastatin 80 milliGRAM(s) Oral at bedtime  dexAMETHasone  Injectable 6 milliGRAM(s) IV Push daily  enoxaparin Injectable 100 milliGRAM(s) SubCutaneous two times a day  glucagon  Injectable 1 milliGRAM(s) IntraMuscular once  insulin glargine Injectable (LANTUS) 48 Unit(s) SubCutaneous at bedtime  insulin lispro (ADMELOG) corrective regimen sliding scale   SubCutaneous three times a day before meals  insulin lispro (ADMELOG) corrective regimen sliding scale   SubCutaneous <User Schedule>  insulin lispro Injectable (ADMELOG) 42 Unit(s) SubCutaneous three times a day before meals  pantoprazole    Tablet 40 milliGRAM(s) Oral before breakfast  polyethylene glycol 3350 17 Gram(s) Oral daily    MEDICATIONS  (PRN):  bisacodyl Suppository 10 milliGRAM(s) Rectal daily PRN Constipation      LABS:                        15.7   12.76 )-----------( 257      ( 05 Feb 2021 06:12 )             47.0     Hgb Trend: 15.7<--, 15.4<--, 14.2<--, 14.3<--, 14.3<--  02-05    148<H>  |  107  |  23  ----------------------------<  94  3.6   |  28  |  0.95    Ca    9.5      05 Feb 2021 06:12    TPro  7.0  /  Alb  3.5  /  TBili  0.4  /  DBili  x   /  AST  40  /  ALT  48<H>  /  AlkPhos  79  02-05    Creatinine Trend: 0.95<--, 1.03<--, 0.94<--, 0.83<--, 1.03<--, 0.98<--      Arterial Blood Gas:  02-05 @ 09:32  7.47/42/54/30/87/6.4  ABG lactate: --  Arterial Blood Gas:  02-03 @ 21:44  7.42/41/84/26/95/1.9  ABG lactate: --    D-dimer 2/4: <150  CRP 2/4: 1.44 <-- 2.77  Ferritin 2/4: 530 <-- 574  BNP 2/4: 41  Procalcitonin 2/4: 0.2    Blood cultures (1/31): no growth in all 4 bottles    Urine culture (1/31): E. coli, sensitive to CTX    CXR (2/4): bilateral pulmonary opacities increased from prior study with stable elevation of right hemidiaphragm. No pleural effusion or pneumothorax    Right Lower Extremity Duplex: no evidence of DVT

## 2021-02-05 NOTE — CONSULT NOTE ADULT - SUBJECTIVE AND OBJECTIVE BOX
HPI:    70F with HTN, HLD, DMT2, sciatica, and history of stroke in 2015 resulting in slurred speech, difficulty walking and weakness on her right side, presenting as a transfer from [facility] (MR [id number]) for basilar artery stenosis in the setting of slurred speech and R-sided weakness. Transferred for possible endovascular intervention. Also admitted for sepsis 2/2 UTI and acute respiratory failure with hypoxia 2/2 COVID-19 infection. MICU consulted for hypoxia on NRB.       PAST MEDICAL & SURGICAL HISTORY:  Cerebrovascular accident (CVA)  2015    Chronic low back pain, unspecified back pain laterality, unspecified whether sciatica present  sciatica    Diabetes mellitus    HLD (hyperlipidemia)    HTN (hypertension)    Sciatica, unspecified laterality    Type 2 diabetes mellitus with other specified complication, unspecified whether long term insulin use    No significant past surgical history    No significant past surgical history        FAMILY HISTORY:  Family history of blood clots        SOCIAL HISTORY:      Allergies    No Known Allergies    Intolerances        HOME MEDICATIONS:    REVIEW OF SYSTEMS:    CONSTITUTIONAL: No weakness, fevers or chills  EYES/ENT: No visual changes;  No vertigo or throat pain   NECK: No pain or stiffness  RESPIRATORY: No cough, wheezing, hemoptysis; No shortness of breath  CARDIOVASCULAR: No chest pain or palpitations  GASTROINTESTINAL: No abdominal or epigastric pain. No nausea, vomiting, or hematemesis; No diarrhea or constipation. No melena or hematochezia.  BACK: No CVA tenderness  GENITOURINARY: No dysuria, frequency or hematuria  NEUROLOGICAL: No numbness or weakness  SKIN: No itching, rashes      OBJECTIVE:  ICU Vital Signs Last 24 Hrs  T(C): 36.6 (05 Feb 2021 07:30), Max: 36.6 (05 Feb 2021 07:30)  T(F): 97.9 (05 Feb 2021 07:30), Max: 97.9 (05 Feb 2021 07:30)  HR: 97 (05 Feb 2021 07:30) (95 - 101)  BP: 135/87 (05 Feb 2021 07:30) (135/87 - 158/98)  BP(mean): --  ABP: --  ABP(mean): --  RR: 32 (05 Feb 2021 07:49) (18 - 32)  SpO2: 90% (05 Feb 2021 07:49) (87% - 92%)        02-04 @ 07:01  -  02-05 @ 07:00  --------------------------------------------------------  IN: 0 mL / OUT: 2850 mL / NET: -2850 mL      CAPILLARY BLOOD GLUCOSE      POCT Blood Glucose.: 152 mg/dL (05 Feb 2021 07:51)      PHYSICAL EXAM:  GENERAL: No acute distress, well-developed, comfortable on HFNC  HEAD:  Atraumatic, Normocephalic  EYES: EOMI, PERRLA, conjunctiva and sclera clear  NECK: Supple, no lymphadenopathy, no JVD  CHEST/LUNG: CTAB; No wheezes, rales, or rhonchi  HEART: Regular rate and rhythm; No murmurs, rubs, or gallops  ABDOMEN: Soft, non-tender, non-distended; normal bowel sounds, no organomegaly  EXTREMITIES:  2+ peripheral pulses b/l, No clubbing, cyanosis, or edema  NEUROLOGY: A&O x 2,   SKIN: No rashes or lesions    HOSPITAL MEDICATIONS:  MEDICATIONS  (STANDING):  atorvastatin 80 milliGRAM(s) Oral at bedtime  dexAMETHasone  Injectable 6 milliGRAM(s) IV Push daily  dextrose 40% Gel 15 Gram(s) Oral once  dextrose 5%. 1000 milliLiter(s) (50 mL/Hr) IV Continuous <Continuous>  dextrose 5%. 1000 milliLiter(s) (100 mL/Hr) IV Continuous <Continuous>  dextrose 50% Injectable 25 Gram(s) IV Push once  dextrose 50% Injectable 12.5 Gram(s) IV Push once  dextrose 50% Injectable 25 Gram(s) IV Push once  enoxaparin Injectable 100 milliGRAM(s) SubCutaneous two times a day  glucagon  Injectable 1 milliGRAM(s) IntraMuscular once  insulin glargine Injectable (LANTUS) 48 Unit(s) SubCutaneous at bedtime  insulin lispro (ADMELOG) corrective regimen sliding scale   SubCutaneous three times a day before meals  insulin lispro (ADMELOG) corrective regimen sliding scale   SubCutaneous <User Schedule>  insulin lispro Injectable (ADMELOG) 42 Unit(s) SubCutaneous three times a day before meals  pantoprazole    Tablet 40 milliGRAM(s) Oral before breakfast  polyethylene glycol 3350 17 Gram(s) Oral daily    MEDICATIONS  (PRN):  bisacodyl Suppository 10 milliGRAM(s) Rectal daily PRN Constipation      LABS:                        15.7   12.76 )-----------( 257      ( 05 Feb 2021 06:12 )             47.0     02-05    148<H>  |  107  |  23  ----------------------------<  94  3.6   |  28  |  0.95    Ca    9.5      05 Feb 2021 06:12    TPro  7.0  /  Alb  3.5  /  TBili  0.4  /  DBili  x   /  AST  40  /  ALT  48<H>  /  AlkPhos  79  02-05        Arterial Blood Gas:  02-05 @ 09:32  7.47/42/54/30/87/6.4  ABG lactate: --  Arterial Blood Gas:  02-03 @ 21:44  7.42/41/84/26/95/1.9  ABG lactate: --        MICROBIOLOGY:     RADIOLOGY:  [ ] Reviewed and interpreted by me    EKG:    ASSESSMENT AND PLAN

## 2021-02-05 NOTE — CHART NOTE - NSCHARTNOTEFT_GEN_A_CORE
Called by RN to report pt is desaturating on NRB (15L). Pt seen at bedside, tachypneic in the low 30's. HFNC ordered. MICU consulted and pt remained on 4 walt (refer to MICU consult). Pt appears more comfortable now on HFNC. Pt is a full code as per daughters. Will continue to closely monitor. Hospitalist Dr. Almonte aware.     Erika Marcus, NP-c  [id number]

## 2021-02-05 NOTE — PROGRESS NOTE ADULT - PROBLEM SELECTOR PLAN 4
[facility]m and [facility] eval appreciated.  - Likely in the setting of worsening COVID infection  - CXR opacities worsening. Unlikely PE (low d-dimer). Unlikely bacterial infection (low procalcitonin). Unlikely CHF (low proBNP).

## 2021-02-05 NOTE — PROGRESS NOTE ADULT - PROBLEM SELECTOR PLAN 1
- Test BG ac and hs  - Decrease Lantus back to 44  units sq qhs  - Decrease Admelog to 38 units AC meals (Hold if patient is NPO) Will adjust as needed  - Moderate correctional scale before meals and qhs and 2am  - Appreciated RD consult   -Please contact endo team with changes on steroid therapy/PO  Discharge Plan:   - Patient may be able to be discharged on home regimen pending inpatient course and steroid taper. Doses TBD.  - Patient can f/u with Endocrine practice as follows  [address], [address]  [place] [address]  [phone]  -Needs Optho follow up  -Plan discussed with pt/team.  Contact info: [phone] (24/7). pager [phone]

## 2021-02-05 NOTE — PROGRESS NOTE ADULT - PROBLEM SELECTOR PLAN 6
- HgA1C 10.1. Discussed with daughter- the patient has significant dietary indiscretions.   - On insulin at home  - Endocrine helping with hyperglycemia management  - Trend fingersticks

## 2021-02-05 NOTE — PROGRESS NOTE ADULT - ASSESSMENT
69 yo F w/h/o uncontrolled DM2 (HbA1c 10.1). DM c/b neuropathy, CVA'15. Also HTN, HLD, chronic low back pain and sciatica. Transferred from [facility] for basilar artery stenosis, while in hospital found to have UTI and COVID-19. On Dexa for COVID  tx. Endocrine Team consulted for inpatient T2DM management. Reports tolerating POs but didn't have breakfast this am due to O2 desaturation to 80s> MICU consult done but pt not a candidate for transfer at this time.  BG 100s to 400s in the last 24 hours requiring large amounts of insulin. Noted FBG in BMP <100s. No hypoglycemia symptoms. Will preventively decrease insulin doses to BG goal 100 to 200 while on steroids. Decreased PO intake when pt O2 goes down. On high flow 02    Spent 25 minutes assessing pt/labs/meds and discussing plan of care with primary team. Moderate complexity encounter on pt with uncontrolled T2DM with complications and comorbidities now with acute infection and steroids worsening glycemic control. Adjusting insulin.

## 2021-02-05 NOTE — CONSULT NOTE ADULT - ATTENDING COMMENTS
I have examined pt and agree with above exam and plan.   71 yo female with Covid pneumonia ARDS with acute hypoxemic respiratory failure. Agree with Hi Flow 02 and proning. I fpt fails Hi flow would intubate and not try BiPAP.
seen in covid unit or airborne and contact jacquelyn RAMIREZ MRN [id number].  transferred to [facility] for higher level of care.   71 yo RH F with HTN, HLD, DM stroke 2016 with slurred speech and R weakness found to have COVID 19 PNA as well as UTI.  sent to [facility] as CTA demonstrates severe BA focal stenosis.  A1c 10.1%, LDL 150mg/dL. likely large artery athero  trend inflammatory markers.  DAPT with asa 81 and plavix 75mg x 3 months then monotherapy  high dose statin  treat infection  MRI brain/MRA nova  avoid hypotension -180
Patient seen and examined. Agree with note as above with addendum: the patient lives in [facility] so we agreed that f/u at [facility] would be best for her. She does not think that her PCP is managing her diabetes well.  Unclear how she got COVID. Her aide and bf don't have it and they are the only ppl in the house with her.  Indu Pretty MD  Pager: [phone], between 9am-6pm  Nights and Weekends: [phone]

## 2021-02-05 NOTE — CONSULT NOTE ADULT - CONSULT REASON
hypoxemia due to COVID pneumonia
Hypoxia
Hyperglycemia
Stroke Patient transferred from outside facility

## 2021-02-05 NOTE — PROGRESS NOTE ADULT - SUBJECTIVE AND OBJECTIVE BOX
Neurology Progress Note    S: Patient seen and examined in covid unit on airborn and contact isolation. on NRBM sat 90% worsening resp status     Medication:  MEDICATIONS  (STANDING):  atorvastatin 80 milliGRAM(s) Oral at bedtime  dexAMETHasone  Injectable 6 milliGRAM(s) IV Push daily  dextrose 40% Gel 15 Gram(s) Oral once  dextrose 5%. 1000 milliLiter(s) (50 mL/Hr) IV Continuous <Continuous>  dextrose 5%. 1000 milliLiter(s) (100 mL/Hr) IV Continuous <Continuous>  dextrose 50% Injectable 25 Gram(s) IV Push once  dextrose 50% Injectable 12.5 Gram(s) IV Push once  dextrose 50% Injectable 25 Gram(s) IV Push once  enoxaparin Injectable 100 milliGRAM(s) SubCutaneous two times a day  glucagon  Injectable 1 milliGRAM(s) IntraMuscular once  insulin glargine Injectable (LANTUS) 44 Unit(s) SubCutaneous at bedtime  insulin lispro (ADMELOG) corrective regimen sliding scale   SubCutaneous three times a day before meals  insulin lispro (ADMELOG) corrective regimen sliding scale   SubCutaneous <User Schedule>  insulin lispro Injectable (ADMELOG) 35 Unit(s) SubCutaneous three times a day before meals  pantoprazole    Tablet 40 milliGRAM(s) Oral before breakfast  polyethylene glycol 3350 17 Gram(s) Oral daily  remdesivir  IVPB 100 milliGRAM(s) IV Intermittent every 24 hours  remdesivir  IVPB   IV Intermittent     MEDICATIONS  (PRN):  bisacodyl Suppository 10 milliGRAM(s) Rectal daily PRN Constipation          Vitals:  ICU Vital Signs Last 24 Hrs  T(C): 36.6 (05 Feb 2021 07:30), Max: 36.6 (05 Feb 2021 07:30)  T(F): 97.9 (05 Feb 2021 07:30), Max: 97.9 (05 Feb 2021 07:30)  HR: 97 (05 Feb 2021 07:30) (95 - 101)  BP: 135/87 (05 Feb 2021 07:30) (135/87 - 158/98)  BP(mean): --  ABP: --  ABP(mean): --  RR: 32 (05 Feb 2021 07:49) (18 - 32)  SpO2: 90% (05 Feb 2021 07:49) (87% - 92%)      General Exam:   General Appearance: Appropriately dressed and in no acute distress       Head: Normocephalic, atraumatic and no dysmorphic features  Ear, Nose, and Throat: Moist mucous membranes  CVS: S1S2+  Resp: No SOB, no wheeze or rhonchi on NRBM  Abd: soft NTND  Extremities: No edema, no cyanosis  Skin: No bruises, no rashes     Neurological Exam:  Mental Status: Awake, alert and oriented x 2-3.  Able to follow simple and complex verbal commands. Able to name and repeat. fluent speech. No obvious aphasia or dysarthria noted.   Cranial Nerves: PERRL, EOMI, VFFC, sensation V1-V3 intact,  mild R facial asymmetry , equal elevation of palate, scm/trap 5/5, tongue is midline on protrusion. no obvious papilledema on fundoscopic exam. Hearing is grossly intact.   Motor: RUE and RLE 4/5, LUE/LLE 5/5   Sensation: Intact to light touch and pinprick throughout.   Reflexes: 1+ throughout at biceps, brachioradialis, triceps, patellars and ankles bilaterally and equal. No clonus. R toe and L toe were both downgoing.  Coordination: No dysmetria on FNF   Gait: deferred     I personally reviewed the below data/images/labs:    CBC Full  -  ( 05 Feb 2021 06:12 )  WBC Count : 12.76 K/uL  RBC Count : 5.46 M/uL  Hemoglobin : 15.7 g/dL  Hematocrit : 47.0 %  Platelet Count - Automated : 257 K/uL  Mean Cell Volume : 86.1 fl  Mean Cell Hemoglobin : 28.8 pg  Mean Cell Hemoglobin Concentration : 33.4 gm/dL  Auto Neutrophil # : x  Auto Lymphocyte # : x  Auto Monocyte # : x  Auto Eosinophil # : x  Auto Basophil # : x  Auto Neutrophil % : x  Auto Lymphocyte % : x  Auto Monocyte % : x  Auto Eosinophil % : x  Auto Basophil % : x    02-05    148<H>  |  107  |  23  ----------------------------<  94  3.6   |  28  |  0.95    Ca    9.5      05 Feb 2021 06:12    TPro  7.0  /  Alb  3.5  /  TBili  0.4  /  DBili  x   /  AST  40  /  ALT  48<H>  /  AlkPhos  79  02-05        Radiology:  -CT Head - unremarkable  -CTA head and neck 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    < from: MR Angio Neck w/wo IV Cont (02.02.21 @ 00:16) >    EXAM:  MR ANGIO BRAIN                          EXAM:  MR ANGIO NECK WAW IC                          EXAM:  MR BRAIN                            PROCEDURE DATE:  02/02/2021            INTERPRETATION:  CLINICAL INDICATION: COVID Positive, basilar stenosis, 70-year-old female with stroke, right-sided weakness and speech for 3 days, altered mental status,    Technique: Contrast brain MRI, non-contrast brain MRA and contrast neck MRA was performed.    Through the brain, sagittal and axial T1, axial T2, FLAIR, diffusion weighted images and an ADC map were obtained. Axial T1 post-contrast images were obtained and reconstructed in sagittal and coronal planes. 10 cc of intravenous gadolinium Gadavist gadolinium was administered and 0 discarded,  for contrast MRI brain and contrast enhanced MR angiography of the extracranial circulation.    MR angiography of intracranial and extracranial circulation was performed with time of flight imaging technique. Maximal intensity projection images were reviewed in multiple planes.    COMPARISON: Head CT, CT perfusion, CTA brain and neck, 1/30/2021    FINDINGS:  Brain MRI:  Foci restricted diffusion with hyperintense DWI, T2, FLAIR signal, left parietal lobe just above the occipital lobe (4:20, 400:20) concerning for new ischemia without hemorrhagic transformation. There is moderate enlargement of the ventricles and sulci greater than expected for age consistent with volume loss. There are foci of encephalomalacia including not limited to the corpus callosum genu and body (3:13). There is encephalomalacia with gliosis along the central and left paramedian goran, correlate with any prior sequela of remote ischemia (7:10, 6:9) with Wallerian degeneration  left cerebral peduncle, goran, and medullary pyramid (7:11-7). There is nonspecific foci of T2/FLAIR signal hyperintensity in the hemispheric white matter, likely microvascular disease, infectious and/or inflammatory etiologies in patient with COVID positivity not excluded with faint FLAIR hyperintensity noted along the bilateral olfactory gyri. There is a partially empty sella. No intraparenchymal hematoma, mass, extra-axial collection or midline shift. Basal cisterns remain patent.    Intact calvarium. Orbits and mastoids are unremarkable. Left maxillary mucosal thickening, with retention cyst and polyps, with mild ethmoid, frontal and sphenoid mucosal thickening.      Brain MRA:  Motion limited MRA,, calcified noncalcified plaque, causes multifocal stenosis bilateral cavernous and supraclinoid ICAs with 3.9 x 2.6 x 2.3 mm AP, TR, CC blister aneurysm extending inferiorly  right internal common carotid artery unchanged. There is flow-related signal  bilateral ICA cavernous and supraclinoid segments with  multifocal stenosis and poststenotic dilatation. Stenosis the right proximal right A1 and M1 segments (6:77). Redemonstration, stenosis and narrowing along the distal right M1, decreased right MCA branches, patent left A1, proximal A2 segments with multifocal stenosis anterior cerebral arteries. Stenosis distal left M1, multifocal stenosis distal left MCA branches, assessment limited by motion artifact.    Fetal bilateral PCAs, dominant anterior circulation, right vertebral artery crosses leftward, vertebral arteries are patent to vertebrobasilar junction, severe stenosis proximal basilar artery  above the vertebrobasilar junction with reconstitution distal basilar via P1 segments and fetal PCA's, multifocal stenosis PCA branch vessels.    Neck MRA:  The aortic arch and origins of the great vessels origins are patent..    Common and Internal Carotids: Tortuous bilateral common carotid arteries with retropharyngeal extension consistent with kissing carotids (5:35) no hemodynamically significant stenosis by NASCET criteria along origin of either right or left internal or external carotid artery.    Vertebral arteries:  Tortuous course and caliber of the bilateral vertebral arteries correlate with hypertension vessels are patent to intracranial V4 segments.    IMPRESSION:    Foci  restricted diffusion, DWI hyperintensity left inferior parietal lobe, no hemorrhagic transformation. Multifocal  T2 and FLAIR hyperintensity white matter may reflect microvascular disease,  additional etiologies not excluded with  COVIDpositivity.    Volume loss, multifocal encephalomalacia  corpus callosum body and splenium, Wallerian degeneration, left paramedian goran, medullary pyramid, no new hemorrhage or midline shift.    Severe flow limiting stenosis of the proximal basilar artery above the vertebral basilar junction with fetal origin PCAs, reconstitution of the distal basilar artery via the bilateral P1 segments.    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    Tortuous extracranial vessels likely from hypertension without hemodynamic significant stenosis at ICA origins by NASCET criteria. Tortuous vertebral arteries patent to V4 segments.    Findings discussed with Dr. Butler of neurology, at immediate time of review, 2/2/2021 at 9:10 AM.                JUDY NAILS MD; Attending Radiologist  This document has been electronically signed. Feb 2 2021  9:14AM    < end of copied text >

## 2021-02-05 NOTE — PROGRESS NOTE ADULT - PROBLEM SELECTOR PLAN 3
- Due to COVID+, UTI -   - Completed ceftriaxone IV x 3 days for UTI (urine cultures with E.coli)  - S/p 5 days of Remdesivir

## 2021-02-05 NOTE — CONSULT NOTE ADULT - ASSESSMENT
70F with HTN, HLD, DMT2, sciatica, and history of stroke in 2015 resulting in slurred speech, difficulty walking and weakness on her right side, presenting as a transfer from [facility] for basilar artery stenosis, course c/b hypoxemic respiratory failure 2/2 COVID PNA. MICU consulted for hypoxia    #Hypoxic Respiratory Failure 2/2 COVID PNA  Currently appears comfortable on HFNC at 50L, 100%. Continue with HFNC and NRB. Avoid BiPAP to prevent high TV. Consider proning and chest PT if desaturation  Continue tx for COVID PNA   GOC with family    Currently appears comfortable, not an ICU candidate. Reconsult as necessary

## 2021-02-05 NOTE — PROGRESS NOTE ADULT - SUBJECTIVE AND OBJECTIVE BOX
[facility] Division of Hospital Medicine  Yovanny Almotne MD, ZELALEM  Pager (M-F, 8A-5P): [phone]  Other Times:  [phone]    Patient is a 70y old  Female who presents with a chief complaint of CVA (05 Feb 2021 16:36)      SUBJECTIVE / OVERNIGHT EVENTS:  Patient's hypoxia worsened overnight. No new complaints.     MEDICATIONS  (STANDING):  atorvastatin 80 milliGRAM(s) Oral at bedtime  dexAMETHasone  Injectable 6 milliGRAM(s) IV Push daily  dextrose 40% Gel 15 Gram(s) Oral once  dextrose 5%. 1000 milliLiter(s) (50 mL/Hr) IV Continuous <Continuous>  dextrose 5%. 1000 milliLiter(s) (100 mL/Hr) IV Continuous <Continuous>  dextrose 50% Injectable 25 Gram(s) IV Push once  dextrose 50% Injectable 12.5 Gram(s) IV Push once  dextrose 50% Injectable 25 Gram(s) IV Push once  enoxaparin Injectable 100 milliGRAM(s) SubCutaneous two times a day  glucagon  Injectable 1 milliGRAM(s) IntraMuscular once  insulin glargine Injectable (LANTUS) 44 Unit(s) SubCutaneous at bedtime  insulin lispro (ADMELOG) corrective regimen sliding scale   SubCutaneous three times a day before meals  insulin lispro (ADMELOG) corrective regimen sliding scale   SubCutaneous <User Schedule>  insulin lispro Injectable (ADMELOG) 38 Unit(s) SubCutaneous three times a day before meals  pantoprazole    Tablet 40 milliGRAM(s) Oral before breakfast  polyethylene glycol 3350 17 Gram(s) Oral daily    MEDICATIONS  (PRN):  bisacodyl Suppository 10 milliGRAM(s) Rectal daily PRN Constipation    CAPILLARY BLOOD GLUCOSE      POCT Blood Glucose.: 276 mg/dL (05 Feb 2021 17:02)  POCT Blood Glucose.: 244 mg/dL (05 Feb 2021 12:24)  POCT Blood Glucose.: 152 mg/dL (05 Feb 2021 07:51)  POCT Blood Glucose.: 110 mg/dL (05 Feb 2021 02:07)  POCT Blood Glucose.: 300 mg/dL (04 Feb 2021 21:27)    I&O's Summary    04 Feb 2021 07:01  -  05 Feb 2021 07:00  --------------------------------------------------------  IN: 0 mL / OUT: 2850 mL / NET: -2850 mL    05 Feb 2021 07:01  -  05 Feb 2021 17:50  --------------------------------------------------------  IN: 0 mL / OUT: 400 mL / NET: -400 mL        PHYSICAL EXAM:  Vital Signs Last 24 Hrs  T(C): 36.9 (05 Feb 2021 11:13), Max: 36.9 (05 Feb 2021 11:13)  T(F): 98.5 (05 Feb 2021 11:13), Max: 98.5 (05 Feb 2021 11:13)  HR: 98 (05 Feb 2021 16:19) (92 - 98)  BP: 144/98 (05 Feb 2021 11:13) (135/87 - 158/98)  BP(mean): --  RR: 25 (05 Feb 2021 16:19) (18 - 32)  SpO2: 90% (05 Feb 2021 16:19) (87% - 92%)    GENERAL: mild respiratory distress on NCHF, overweight  HEAD:  Atraumatic, Normocephalic  EYES: EOMI, conjunctiva and sclera clear  NECK: Supple, No JVD  CHEST/LUNG: Clear to auscultation bilaterally; No rales  HEART: rrr, S1, S2, no murmurs   ABDOMEN: Bowel sounds present; Soft, NT, ND.   EXTREMITIES:  no edema  NERVOUS SYSTEM:  AOx2 (not name of hospital), slower to answer with mild dysarthria noted, follows commands, 4/5 strength RUE and 3+/5 RLE, 5/5 strength LUE/LLE  MSK: no joint swelling       LABS:                        15.7   12.76 )-----------( 257      ( 05 Feb 2021 06:12 )             47.0     02-05    148<H>  |  107  |  23  ----------------------------<  94  3.6   |  28  |  0.95    Ca    9.5      05 Feb 2021 06:12    TPro  7.0  /  Alb  3.5  /  TBili  0.4  /  DBili  x   /  AST  40  /  ALT  48<H>  /  AlkPhos  79  02-05        RADIOLOGY & ADDITIONAL TESTS:    Lab Results Reviewed    COORDINATION OF CARE:  Care Discussed with Consultants/Other Providers:  Pulm re: hypoxia

## 2021-02-05 NOTE — PROGRESS NOTE ADULT - PROBLEM SELECTOR PLAN 1
- Hx of stroke in 2015 resulting in slurred speech, difficulty walking weakness on her right presenting with basilar stenosis with symptoms reminiscent of 2015 stroke (slurred speech and R sided weakness)  - Neurology follow up appreciated- suspected embolic stroke per neuro attending.  - Check TTE with bubble study (ordered, pending).   - MRA consisted with 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 Plan to follow up with neuro outpatient- may benefit from angiogram in the future (outpatient).   - Per neuro, APLS labs negative - can place on Eliquis 5mg BID x 1 month followed by ASA 81 and plavix 75mg daily.  - Continue Atorvastatin 80mg qHS, goal LDL<70 (currently 150)  - Monitor on telemetry  - Neuro checks q4 on the floor  - Permissive HTN (goal -180) Has been at goal.  - PT/OT eval - recommended for NAYAN (daughters agreeable)- would need to wait for 10 days of COVID to elapse (2/6/2021)  - Tolerating diet

## 2021-02-05 NOTE — PROGRESS NOTE ADULT - PROBLEM SELECTOR PLAN 2
- BP variable  (Goal <130/80)  - Management per primary team    Problem 3: HLD  - Continue statin  -F/u lipids as out pt

## 2021-02-05 NOTE — PROGRESS NOTE ADULT - ASSESSMENT
70F PMH HTN, HLD, DM2 (A1C 10), history of CVA with residual R-sided hemiparesis, and sciatica who initially presented with several days of slurred speech and R-sided weakness increased from baseline, found to have COVID-19 + E. coli UTI. Now with worsening hypoxemia due to progressive COVID-19 pneumonia and elevated right hemidiaphragm.    - Worsening hypoxemia overnight requiring high flow nasal cannula, currently on 60 LPM, 100% FiO2 with SpO2 88-92%  - Goal SpO2>87%  - Try to avoid BiPAP given high tidal volumes and risk of volutrauma/barotrauma  - S/p 5-day course of remdesevir  - Complete 10-day course of dexamethasone  - On therapeutic enoxaparin given recent stroke + COVID-19 infection  - Improving inflammatory markers, continue to monitor D-dimer, CRP, ferritin, BNP, procalcitonin  - Screening sputum cultures & nasal MRSA PCR  - Prognosis guarded, full code 70F PMH HTN, HLD, DM2 (A1C 10), history of CVA with residual R-sided hemiparesis, and sciatica who initially presented with several days of slurred speech and R-sided weakness increased from baseline, found to have COVID-19 + E. coli UTI. Now with worsening hypoxemia due to progressive COVID-19 pneumonia and elevated right hemidiaphragm.    - Worsening hypoxemia overnight requiring high flow nasal cannula, currently on 60 LPM, 100% FiO2 with SpO2 88-92%  - Goal SpO2>87%  - No evidence of cardiogenic pulmonary edema given peripheral opacities, no pleural effusion, and normal BNP (41)  - Try to avoid BiPAP given high tidal volumes and risk of volutrauma/barotrauma  - S/p 5-day course of remdesevir  - Complete 10-day course of dexamethasone  - On therapeutic enoxaparin given recent stroke + COVID-19 infection  - Improving inflammatory markers, continue to monitor D-dimer, CRP, ferritin, BNP, procalcitonin  - Screening sputum cultures & nasal MRSA PCR  - Prognosis guarded, full code, high risk for respiratory deterioration

## 2021-02-05 NOTE — PROGRESS NOTE ADULT - SUBJECTIVE AND OBJECTIVE BOX
DIABETES FOLLOW UP NOTE: Saw pt earlier today  INTERVAL HX: 71 yo F w/h/o uncontrolled DM2 (HbA1c 10.1). DM c/b neuropathy, CVA'15. Also HTN, HLD, chronic low back pain and sciatica. Transferred from [facility] for basilar artery stenosis, while in hospital found to have UTI and COVID-19. On Dexa for COVID  tx. Endocrine Team consulted for inpatient T2DM management. Reports tolerating POs but didn't have breakfast this am due to O2 desaturation to 80s> MICU consult done but pt not a candidate for transfer at this time.  BG 100s to 400s in the last 24 hours requiring large amounts of insulin. Noted FBG in BMP <100s. No hypoglycemia symptoms. Pt feeling better at time of visit. Reports no SOB.       Review of Systems:  General: As above  Cardiovascular: No chest pain, palpitations  Respiratory: on/off SOB, on/off cough  GI: No nausea, vomiting, abdominal pain  Endocrine: no polyuria, polydipsia or S&Sx of hypoglycemia    Allergies    No Known Allergies    Intolerances      MEDICATIONS:  atorvastatin 80 milliGRAM(s) Oral at bedtime  dexAMETHasone  Injectable 6 milliGRAM(s) IV Push daily  insulin glargine Injectable (LANTUS) 48 Unit(s) SubCutaneous at bedtime  insulin lispro (ADMELOG) corrective regimen sliding scale   SubCutaneous three times a day before meals  insulin lispro (ADMELOG) corrective regimen sliding scale   SubCutaneous <User Schedule>  insulin lispro Injectable (ADMELOG) 42 Unit(s) SubCutaneous three times a day before meals        PHYSICAL EXAM:  VITALS: T(C): 36.9 (02-05-21 @ 11:13)  T(F): 98.5 (02-05-21 @ 11:13), Max: 98.5 (02-05-21 @ 11:13)  HR: 98 (02-05-21 @ 16:19) (92 - 98)  BP: 144/98 (02-05-21 @ 11:13) (135/87 - 158/98)  RR:  (18 - 32)  SpO2:  (87% - 92%)  Wt(kg): --  GENERAL: Female laying on bed in NAD.   Abdomen: Soft, nontender, non distended, obese  Extremities: Warm, no edema in all 4 exts  NEURO: Alert and able to answer simple questions. Unable to elaborate    LABS:  POCT Blood Glucose.: 244 mg/dL (02-05-21 @ 12:24)  POCT Blood Glucose.: 152 mg/dL (02-05-21 @ 07:51)  POCT Blood Glucose.: 110 mg/dL (02-05-21 @ 02:07)  POCT Blood Glucose.: 300 mg/dL (02-04-21 @ 21:27)  POCT Blood Glucose.: 391 mg/dL (02-04-21 @ 16:38)  POCT Blood Glucose.: 471 mg/dL (02-04-21 @ 12:09)  POCT Blood Glucose.: 459 mg/dL (02-04-21 @ 12:08)  POCT Blood Glucose.: 195 mg/dL (02-04-21 @ 07:44)  POCT Blood Glucose.: 222 mg/dL (02-04-21 @ 02:02)  POCT Blood Glucose.: 412 mg/dL (02-03-21 @ 21:33)  POCT Blood Glucose.: 473 mg/dL (02-03-21 @ 16:31)  POCT Blood Glucose.: 429 mg/dL (02-03-21 @ 16:30)  POCT Blood Glucose.: 484 mg/dL (02-03-21 @ 12:16)  POCT Blood Glucose.: 461 mg/dL (02-03-21 @ 12:15)  POCT Blood Glucose.: 286 mg/dL (02-03-21 @ 07:56)  POCT Blood Glucose.: 321 mg/dL (02-03-21 @ 00:23)  POCT Blood Glucose.: 430 mg/dL (02-02-21 @ 21:00)  POCT Blood Glucose.: 446 mg/dL (02-02-21 @ 21:00)  POCT Blood Glucose.: 437 mg/dL (02-02-21 @ 16:38)  POCT Blood Glucose.: 458 mg/dL (02-02-21 @ 16:37)                            15.7   12.76 )-----------( 257      ( 05 Feb 2021 06:12 )             47.0       02-05    148<H>  |  107  |  23  ----------------------------<  94  3.6   |  28  |  0.95    EGFR if : 70      Ca    9.5      02-05  Mg     2.0     02-03    TPro  7.0  /  Alb  3.5  /  TBili  0.4  /  DBili  x   /  AST  40  /  ALT  48<H>  /  AlkPhos  79  02-05      A1C with Estimated Average Glucose Result: 10.1 % (01-31-21 @ 09:13)      Estimated Average Glucose: 243 mg/dL (01-31-21 @ 09:13)        01-31 Chol 227<H> LDL -- HDL 44<L> Trig 164<H>

## 2021-02-06 NOTE — PROGRESS NOTE ADULT - ATTENDING COMMENTS
Brandon Fowler MD  Division of Hospital Medicine  Pager: [phone]  Office: [phone]    high risk for deterioration from respiratory standpoint. Pulm following. Appreciate neuro assistance.

## 2021-02-06 NOTE — PROGRESS NOTE ADULT - SUBJECTIVE AND OBJECTIVE BOX
[facility] Division of Hospital Medicine  Brandon Fowler MD  Pager (M-F, 2O-6I): [phone]  Other Times:  [phone]    Patient is a 70y old  Female who presents with a chief complaint of CVA (06 Feb 2021 11:49)      SUBJECTIVE / OVERNIGHT EVENTS: Reports sob, LUQ pain. no n/v/d.  ADDITIONAL REVIEW OF SYSTEMS:  no cp or sob    MEDICATIONS  (STANDING):  ALBUTerol    90 MICROgram(s) HFA Inhaler 1 Puff(s) Inhalation every 6 hours  apixaban 5 milliGRAM(s) Oral two times a day  atorvastatin 80 milliGRAM(s) Oral at bedtime  dexAMETHasone  Injectable 6 milliGRAM(s) IV Push daily  dextrose 40% Gel 15 Gram(s) Oral once  dextrose 5%. 1000 milliLiter(s) (50 mL/Hr) IV Continuous <Continuous>  dextrose 5%. 1000 milliLiter(s) (100 mL/Hr) IV Continuous <Continuous>  dextrose 50% Injectable 25 Gram(s) IV Push once  dextrose 50% Injectable 12.5 Gram(s) IV Push once  dextrose 50% Injectable 25 Gram(s) IV Push once  glucagon  Injectable 1 milliGRAM(s) IntraMuscular once  insulin glargine Injectable (LANTUS) 40 Unit(s) SubCutaneous at bedtime  insulin lispro (ADMELOG) corrective regimen sliding scale   SubCutaneous three times a day before meals  insulin lispro (ADMELOG) corrective regimen sliding scale   SubCutaneous <User Schedule>  insulin lispro Injectable (ADMELOG) 44 Unit(s) SubCutaneous three times a day before meals  pantoprazole    Tablet 40 milliGRAM(s) Oral before breakfast  polyethylene glycol 3350 17 Gram(s) Oral daily    MEDICATIONS  (PRN):  bisacodyl Suppository 10 milliGRAM(s) Rectal daily PRN Constipation      CAPILLARY BLOOD GLUCOSE      POCT Blood Glucose.: 377 mg/dL (06 Feb 2021 12:02)  POCT Blood Glucose.: 194 mg/dL (06 Feb 2021 07:47)  POCT Blood Glucose.: 153 mg/dL (06 Feb 2021 02:11)  POCT Blood Glucose.: 385 mg/dL (05 Feb 2021 21:18)  POCT Blood Glucose.: 276 mg/dL (05 Feb 2021 17:02)    I&O's Summary    05 Feb 2021 07:01  -  06 Feb 2021 07:00  --------------------------------------------------------  IN: 960 mL / OUT: 1700 mL / NET: -740 mL    06 Feb 2021 07:01  -  06 Feb 2021 14:09  --------------------------------------------------------  IN: 240 mL / OUT: 0 mL / NET: 240 mL        PHYSICAL EXAM:  Vital Signs Last 24 Hrs  T(C): 36.5 (06 Feb 2021 12:32), Max: 37 (06 Feb 2021 00:14)  T(F): 97.7 (06 Feb 2021 12:32), Max: 98.6 (06 Feb 2021 00:14)  HR: 109 (06 Feb 2021 12:32) (98 - 112)  BP: 138/91 (06 Feb 2021 12:32) (138/91 - 154/102)  BP(mean): --  RR: 30 (06 Feb 2021 12:32) (18 - 30)  SpO2: 88% (06 Feb 2021 12:32) (87% - 97%)  CONSTITUTIONAL: in mild distress, well-developed, obese  EYES: EOMI; conjunctiva and sclera clear  ENMT: Moist oral mucosa, no pharyngeal injection or exudates  RESPIRATORY: increased respiratory effort; lungs w/ scattered crackles  CARDIOVASCULAR: Regular rate and rhythm, normal S1 and S2, no murmur/rub/gallop; No lower extremity edema  ABDOMEN: Nontender to palpation, normoactive bowel sounds, no rebound/guarding; No hepatosplenomegaly  PSYCH: A+O to person, place, and time; affect appropriate  NEUROLOGY: moving all extremities; no gross sensory deficits strength on L > R  SKIN: No rashes; no palpable lesions    LABS:                        15.3   14.23 )-----------( 272      ( 06 Feb 2021 05:33 )             45.8     02-06    145  |  108  |  24<H>  ----------------------------<  123<H>  4.2   |  26  |  0.93    Ca    9.1      06 Feb 2021 05:33    TPro  6.5  /  Alb  3.0<L>  /  TBili  0.5  /  DBili  x   /  AST  46<H>  /  ALT  45  /  AlkPhos  80  02-06                RADIOLOGY & ADDITIONAL TESTS:  Results Reviewed:   Imaging Personally Reviewed:  Electrocardiogram Personally Reviewed:    COORDINATION OF CARE:  Care Discussed with Consultants/Other Providers [Y/N]:  Prior or Outpatient Records Reviewed [Y/N]:

## 2021-02-06 NOTE — CHART NOTE - NSCHARTNOTEFT_GEN_A_CORE
Reviewed lab values/chart notes over past 24 hours. BG values 200-300s last night down to 190s this AM.     Plan:  -test BG AC/HS/2AM  -Decrease Lantus 40 units QHS  -Increase Admelog 44 units AC meals for now>Please hold if not eating. Can reduce dose if BG tightly controlled prior to meal  -c/w Admelog moderate correction scale AC and Mod HS/2AM scale  - Appreciated RD consult   -Please contact endo team with changes on steroid therapy/PO  Discharge Plan:   - Patient may be able to be discharged on home regimen pending inpatient course and steroid taper. Doses TBD.  - Patient can f/u with Endocrine practice as follows  [address], [address]  [place] [address]  [phone]  -Needs Optho follow up      pager: [phone]   office:  [phone] (M-F 9a-5pm)               [phone] (nights/weekends)

## 2021-02-06 NOTE — PROGRESS NOTE ADULT - PROBLEM SELECTOR PLAN 5
- Diagnosed 1/30  - C/w decadron x10 days. Completed remdesivir  - Oxygen supplementation as above  - Trend inflammatory markers (improving)

## 2021-02-06 NOTE — PROGRESS NOTE ADULT - SUBJECTIVE AND OBJECTIVE BOX
Neurology Progress Note    S: Patient seen and examined in covid unit on airborn and contact isolation. now on HiFlo    Medication:  MEDICATIONS  (STANDING):  ALBUTerol    90 MICROgram(s) HFA Inhaler 1 Puff(s) Inhalation every 6 hours  apixaban 5 milliGRAM(s) Oral two times a day  atorvastatin 80 milliGRAM(s) Oral at bedtime  dexAMETHasone  Injectable 6 milliGRAM(s) IV Push daily  dextrose 40% Gel 15 Gram(s) Oral once  dextrose 5%. 1000 milliLiter(s) (50 mL/Hr) IV Continuous <Continuous>  dextrose 5%. 1000 milliLiter(s) (100 mL/Hr) IV Continuous <Continuous>  dextrose 50% Injectable 25 Gram(s) IV Push once  dextrose 50% Injectable 12.5 Gram(s) IV Push once  dextrose 50% Injectable 25 Gram(s) IV Push once  glucagon  Injectable 1 milliGRAM(s) IntraMuscular once  insulin glargine Injectable (LANTUS) 40 Unit(s) SubCutaneous at bedtime  insulin lispro (ADMELOG) corrective regimen sliding scale   SubCutaneous three times a day before meals  insulin lispro (ADMELOG) corrective regimen sliding scale   SubCutaneous <User Schedule>  insulin lispro Injectable (ADMELOG) 44 Unit(s) SubCutaneous three times a day before meals  pantoprazole    Tablet 40 milliGRAM(s) Oral before breakfast  polyethylene glycol 3350 17 Gram(s) Oral daily    MEDICATIONS  (PRN):  bisacodyl Suppository 10 milliGRAM(s) Rectal daily PRN Constipation        Vitals:  ICU Vital Signs Last 24 Hrs  T(C): 36.5 (06 Feb 2021 12:32), Max: 37 (06 Feb 2021 00:14)  T(F): 97.7 (06 Feb 2021 12:32), Max: 98.6 (06 Feb 2021 00:14)  HR: 109 (06 Feb 2021 12:32) (98 - 112)  BP: 138/91 (06 Feb 2021 12:32) (138/91 - 154/102)  BP(mean): --  ABP: --  ABP(mean): --  RR: 30 (06 Feb 2021 12:32) (18 - 30)  SpO2: 88% (06 Feb 2021 12:32) (87% - 97%)        General Exam:   General Appearance: Appropriately dressed and in no acute distress       Head: Normocephalic, atraumatic and no dysmorphic features  Ear, Nose, and Throat: Moist mucous membranes HiFLo  CVS: S1S2+  Resp: No SOB, no wheeze or rhonchi on NRBM  Abd: soft NTND  Extremities: No edema, no cyanosis  Skin: No bruises, no rashes     Neurological Exam:  Mental Status: Awake, alert and oriented x 2-3.  Able to follow simple and complex verbal commands. Able to name and repeat. fluent speech. No obvious aphasia or dysarthria noted.   Cranial Nerves: PERRL, EOMI, VFFC, sensation V1-V3 intact,  mild R facial asymmetry , equal elevation of palate, scm/trap 5/5, tongue is midline on protrusion. no obvious papilledema on fundoscopic exam. Hearing is grossly intact.   Motor: RUE and RLE 4/5, LUE/LLE 5/5   Sensation: Intact to light touch and pinprick throughout.   Reflexes: 1+ throughout at biceps, brachioradialis, triceps, patellars and ankles bilaterally and equal. No clonus. R toe and L toe were both downgoing.  Coordination: No dysmetria on FNF   Gait: deferred     I personally reviewed the below data/images/labs:    CBC Full  -  ( 06 Feb 2021 05:33 )  WBC Count : 14.23 K/uL  RBC Count : 5.31 M/uL  Hemoglobin : 15.3 g/dL  Hematocrit : 45.8 %  Platelet Count - Automated : 272 K/uL  Mean Cell Volume : 86.3 fl  Mean Cell Hemoglobin : 28.8 pg  Mean Cell Hemoglobin Concentration : 33.4 gm/dL  Auto Neutrophil # : x  Auto Lymphocyte # : x  Auto Monocyte # : x  Auto Eosinophil # : x  Auto Basophil # : x  Auto Neutrophil % : x  Auto Lymphocyte % : x  Auto Monocyte % : x  Auto Eosinophil % : x  Auto Basophil % : x    02-06    145  |  108  |  24<H>  ----------------------------<  123<H>  4.2   |  26  |  0.93    Ca    9.1      06 Feb 2021 05:33    TPro  6.5  /  Alb  3.0<L>  /  TBili  0.5  /  DBili  x   /  AST  46<H>  /  ALT  45  /  AlkPhos  80  02-06      Radiology:  -CT Head - unremarkable  -CTA head and neck 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    < from: MR Angio Neck w/wo IV Cont (02.02.21 @ 00:16) >    EXAM:  MR ANGIO BRAIN                          EXAM:  MR ANGIO NECK WAW IC                          EXAM:  MR BRAIN                            PROCEDURE DATE:  02/02/2021            INTERPRETATION:  CLINICAL INDICATION: COVID Positive, basilar stenosis, 70-year-old female with stroke, right-sided weakness and speech for 3 days, altered mental status,    Technique: Contrast brain MRI, non-contrast brain MRA and contrast neck MRA was performed.    Through the brain, sagittal and axial T1, axial T2, FLAIR, diffusion weighted images and an ADC map were obtained. Axial T1 post-contrast images were obtained and reconstructed in sagittal and coronal planes. 10 cc of intravenous gadolinium Gadavist gadolinium was administered and 0 discarded,  for contrast MRI brain and contrast enhanced MR angiography of the extracranial circulation.    MR angiography of intracranial and extracranial circulation was performed with time of flight imaging technique. Maximal intensity projection images were reviewed in multiple planes.    COMPARISON: Head CT, CT perfusion, CTA brain and neck, 1/30/2021    FINDINGS:  Brain MRI:  Foci restricted diffusion with hyperintense DWI, T2, FLAIR signal, left parietal lobe just above the occipital lobe (4:20, 400:20) concerning for new ischemia without hemorrhagic transformation. There is moderate enlargement of the ventricles and sulci greater than expected for age consistent with volume loss. There are foci of encephalomalacia including not limited to the corpus callosum genu and body (3:13). There is encephalomalacia with gliosis along the central and left paramedian goran, correlate with any prior sequela of remote ischemia (7:10, 6:9) with Wallerian degeneration  left cerebral peduncle, goran, and medullary pyramid (7:11-7). There is nonspecific foci of T2/FLAIR signal hyperintensity in the hemispheric white matter, likely microvascular disease, infectious and/or inflammatory etiologies in patient with COVID positivity not excluded with faint FLAIR hyperintensity noted along the bilateral olfactory gyri. There is a partially empty sella. No intraparenchymal hematoma, mass, extra-axial collection or midline shift. Basal cisterns remain patent.    Intact calvarium. Orbits and mastoids are unremarkable. Left maxillary mucosal thickening, with retention cyst and polyps, with mild ethmoid, frontal and sphenoid mucosal thickening.      Brain MRA:  Motion limited MRA,, calcified noncalcified plaque, causes multifocal stenosis bilateral cavernous and supraclinoid ICAs with 3.9 x 2.6 x 2.3 mm AP, TR, CC blister aneurysm extending inferiorly  right internal common carotid artery unchanged. There is flow-related signal  bilateral ICA cavernous and supraclinoid segments with  multifocal stenosis and poststenotic dilatation. Stenosis the right proximal right A1 and M1 segments (6:77). Redemonstration, stenosis and narrowing along the distal right M1, decreased right MCA branches, patent left A1, proximal A2 segments with multifocal stenosis anterior cerebral arteries. Stenosis distal left M1, multifocal stenosis distal left MCA branches, assessment limited by motion artifact.    Fetal bilateral PCAs, dominant anterior circulation, right vertebral artery crosses leftward, vertebral arteries are patent to vertebrobasilar junction, severe stenosis proximal basilar artery  above the vertebrobasilar junction with reconstitution distal basilar via P1 segments and fetal PCA's, multifocal stenosis PCA branch vessels.    Neck MRA:  The aortic arch and origins of the great vessels origins are patent..    Common and Internal Carotids: Tortuous bilateral common carotid arteries with retropharyngeal extension consistent with kissing carotids (5:35) no hemodynamically significant stenosis by NASCET criteria along origin of either right or left internal or external carotid artery.    Vertebral arteries:  Tortuous course and caliber of the bilateral vertebral arteries correlate with hypertension vessels are patent to intracranial V4 segments.    IMPRESSION:    Foci  restricted diffusion, DWI hyperintensity left inferior parietal lobe, no hemorrhagic transformation. Multifocal  T2 and FLAIR hyperintensity white matter may reflect microvascular disease,  additional etiologies not excluded with  COVIDpositivity.    Volume loss, multifocal encephalomalacia  corpus callosum body and splenium, Wallerian degeneration, left paramedian goran, medullary pyramid, no new hemorrhage or midline shift.    Severe flow limiting stenosis of the proximal basilar artery above the vertebral basilar junction with fetal origin PCAs, reconstitution of the distal basilar artery via the bilateral P1 segments.    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    Tortuous extracranial vessels likely from hypertension without hemodynamic significant stenosis at ICA origins by NASCET criteria. Tortuous vertebral arteries patent to V4 segments.    Findings discussed with Dr. Butler of neurology, at immediate time of review, 2/2/2021 at 9:10 AM.                JUDY NAILS MD; Attending Radiologist  This document has been electronically signed. Feb 2 2021  9:14AM    < end of copied text >

## 2021-02-06 NOTE — PROGRESS NOTE ADULT - ASSESSMENT
70F with HTN, HLD, DMT2, sciatica, and history of stroke in 2015 resulting in slurred speech, difficulty walking and weakness on her right side, presenting as a transfer from [facility] (MR [id number]) for basilar artery stenosis in the setting of slurred speech and R-sided weakness. Transferred for possible endovascular intervention. Also admitted for sepsis 2/2 UTI and acute respiratory failure with hypoxia 2/2 COVID-19 infection.

## 2021-02-06 NOTE — PROGRESS NOTE ADULT - ASSESSMENT
69 yo RH F with HTN, HLD, DM stroke 2016 with slurred speech and R weakness found to have COVID 19 PNA as well as UTI.  sent to [facility] as CTA demonstrates severe BA focal stenosis.  A1c 10.1%, LDL 150mg/dL, MRI with L parietal infarct, MRA with severe BA stenosis and fetal PCAs and supraclinoid ICA blister aneurysm, also multifocal stenosis in  likely large artery athero. LA neg,  doubt vasculitis. no HA. Note Parietal infarct is anterior circulation not related to BA stenosis. DRVVT/LA neg. on HiFlo   - consider r/o PE   - f/u APLS labs including beta 2 glycoprotein, lupus anticoagulant (negative) and cardiolipin Abs.  If APLS labs negative can place on Eliquis 5mg BID x 1 month followed by ASA 81 and plavix 75mg daily.  - suggest diagnostic angio with INR Dr. Hernandez - multifocal stenosis including severe BA stenosis, blister aneurysm.  will likely defer given COVID status and unlikely to change current management. will need to schedule in future. f/u with neurosx  - spoke with Nsx resident Dr. León requesting consult   - high dose statin, lipitor 80mg daily  - treat infection on remdesiver and decadron for covid infection  - monitor inflammatory markers.   - on decadron  - avoid hypotension -180 .   - PT/OT  - check FS, glucose control <180  - GI/DVT ppx  - Counseling on diet, exercise, and medication adherence was done  - Counseling on smoking cessation and alcohol consumption offered when appropriate.  - Pain assessed and judicious use of narcotics when appropriate was discussed.    - Stroke education given when appropriate.  - Importance of fall prevention discussed.   - Differential diagnosis and plan of care discussed with patient and/or family and primary team  - Thank you for allowing me to participate in the care of this patient. Call with questions.   Naag De Leon MD  Vascular Neurology  Office: [phone]

## 2021-02-06 NOTE — PROGRESS NOTE ADULT - PROBLEM SELECTOR PLAN 4
- [facility] and [facility] eval appreciated.  - Likely in the setting of worsening COVID infection  - CXR opacities worsening. Unlikely PE (low d-dimer). Unlikely bacterial infection (low procalcitonin). Unlikely CHF (low proBNP).

## 2021-02-07 NOTE — PROGRESS NOTE ADULT - ASSESSMENT
70F with HTN, HLD, DMT2, sciatica, and history of stroke in 2015 resulting in slurred speech, difficulty walking and weakness on her right side, presenting as a transfer from [facility] (MR [id number]) for basilar artery stenosis in the setting of slurred speech and R-sided weakness. Transferred for possible endovascular intervention. Also admitted for sepsis 2/2 UTI and acute respiratory failure with hypoxia 2/2 COVID-19 infection.

## 2021-02-07 NOTE — PROGRESS NOTE ADULT - SUBJECTIVE AND OBJECTIVE BOX
[facility] Division of Hospital Medicine  Brandon Fowler MD  Pager (M-F, 4X-4U): [phone]  Other Times:  [phone]    Patient is a 70y old  Female who presents with a chief complaint of CVA (06 Feb 2021 14:08)      SUBJECTIVE / OVERNIGHT EVENTS: NAEON. Remains on max HFNC. Sob, but feels okay with HFNC. Eating breakfast.    ADDITIONAL REVIEW OF SYSTEMS:  no cp.    MEDICATIONS  (STANDING):  ALBUTerol    90 MICROgram(s) HFA Inhaler 1 Puff(s) Inhalation every 6 hours  apixaban 5 milliGRAM(s) Oral two times a day  atorvastatin 80 milliGRAM(s) Oral at bedtime  dexAMETHasone  Injectable 6 milliGRAM(s) IV Push daily  dextrose 40% Gel 15 Gram(s) Oral once  dextrose 5%. 1000 milliLiter(s) (50 mL/Hr) IV Continuous <Continuous>  dextrose 5%. 1000 milliLiter(s) (100 mL/Hr) IV Continuous <Continuous>  dextrose 50% Injectable 25 Gram(s) IV Push once  dextrose 50% Injectable 12.5 Gram(s) IV Push once  dextrose 50% Injectable 25 Gram(s) IV Push once  glucagon  Injectable 1 milliGRAM(s) IntraMuscular once  insulin glargine Injectable (LANTUS) 36 Unit(s) SubCutaneous at bedtime  insulin lispro (ADMELOG) corrective regimen sliding scale   SubCutaneous three times a day before meals  insulin lispro (ADMELOG) corrective regimen sliding scale   SubCutaneous <User Schedule>  insulin lispro Injectable (ADMELOG) 48 Unit(s) SubCutaneous three times a day before meals  pantoprazole    Tablet 40 milliGRAM(s) Oral before breakfast  polyethylene glycol 3350 17 Gram(s) Oral daily    MEDICATIONS  (PRN):  acetaminophen   Tablet .. 650 milliGRAM(s) Oral every 6 hours PRN Mild Pain (1 - 3)  bisacodyl Suppository 10 milliGRAM(s) Rectal daily PRN Constipation      CAPILLARY BLOOD GLUCOSE      POCT Blood Glucose.: 252 mg/dL (07 Feb 2021 11:43)  POCT Blood Glucose.: 162 mg/dL (07 Feb 2021 07:47)  POCT Blood Glucose.: 105 mg/dL (07 Feb 2021 01:31)  POCT Blood Glucose.: 244 mg/dL (06 Feb 2021 21:05)  POCT Blood Glucose.: 257 mg/dL (06 Feb 2021 16:56)    I&O's Summary    06 Feb 2021 07:01  -  07 Feb 2021 07:00  --------------------------------------------------------  IN: 1060 mL / OUT: 2000 mL / NET: -940 mL        PHYSICAL EXAM:  Vital Signs Last 24 Hrs  T(C): 36.4 (07 Feb 2021 12:18), Max: 37.2 (07 Feb 2021 05:18)  T(F): 97.5 (07 Feb 2021 12:18), Max: 99 (07 Feb 2021 05:18)  HR: 105 (07 Feb 2021 12:18) (105 - 110)  BP: 138/91 (07 Feb 2021 12:18) (135/86 - 138/91)  BP(mean): --  RR: 26 (07 Feb 2021 12:18) (20 - 28)  SpO2: 90% (07 Feb 2021 12:18) (88% - 91%)  CONSTITUTIONAL: in mild distress, well-developed, obese  EYES: EOMI; conjunctiva and sclera clear  ENMT: Moist oral mucosa, no pharyngeal injection or exudates  RESPIRATORY: increased respiratory effort; lungs w/ scattered crackles  CARDIOVASCULAR: Regular rate and rhythm, normal S1 and S2, no murmur/rub/gallop; No lower extremity edema  ABDOMEN: Nontender to palpation, normoactive bowel sounds, no rebound/guarding; No hepatosplenomegaly  PSYCH: A+O to person, place, and time; affect appropriate  NEUROLOGY: moving all extremities; no gross sensory deficits strength on L > R  SKIN: No rashes; no palpable lesions    LABS:                        15.2   17.01 )-----------( 299      ( 07 Feb 2021 06:47 )             45.8     02-07    148<H>  |  106  |  32<H>  ----------------------------<  95  4.5   |  27  |  1.10    Ca    9.7      07 Feb 2021 06:41    TPro  6.5  /  Alb  3.0<L>  /  TBili  0.5  /  DBili  x   /  AST  46<H>  /  ALT  45  /  AlkPhos  80  02-06                RADIOLOGY & ADDITIONAL TESTS:  Results Reviewed:   Imaging Personally Reviewed:  Electrocardiogram Personally Reviewed:    COORDINATION OF CARE:  Care Discussed with Consultants/Other Providers [Y/N]:  Prior or Outpatient Records Reviewed [Y/N]:

## 2021-02-07 NOTE — CHART NOTE - NSCHARTNOTEFT_GEN_A_CORE
BG values mid 100-mid 200s over past 24 hours. Remains on decadron 6mg IV daily. On high flow O2.     Plan:  -test BG AC/HS/2AM  -Decrease Lantus 36 units QHS  -Increase Admelog 48 units SQ TID w/meals (can give half of dose if BG less than 100mg/dl or concern that pt is not eating enough)  -c/w Admelog moderate correction scale AC and Mod HS/2AM scale  -Please contact endo team with changes on steroid therapy/PO  Discharge Plan:   - Patient may be able to be discharged on home regimen pending inpatient course and steroid taper. Doses TBD.  - Patient can f/u with Endocrine practice as follows  [address], [address]  [place] [address]  [phone]  -Needs Optho follow up      pager: [phone]   office:  [phone] (M-F 9a-5pm)               [phone] (nights/weekend

## 2021-02-07 NOTE — PROGRESS NOTE ADULT - PROBLEM SELECTOR PLAN 4
[facility] and [facility] eval appreciated.  - Likely in the setting of worsening COVID infection  - CXR opacities worsening. Unlikely PE (low d-dimer). Unlikely bacterial infection (low procalcitonin). Unlikely CHF (low proBNP).

## 2021-02-08 NOTE — PROGRESS NOTE ADULT - PROBLEM SELECTOR PLAN 1
- Test BG ac and hs  - C/w Lantus 36 units sq qhs  - If eating Admelog 48-44-40 units AC meals (Hold if patient is NPO) Will adjust as needed  - Moderate correctional scale before meals and qhs and 2am  -Please contact endo team with changes on steroid therapy/PO  Discharge Plan:   - Patient may be able to be discharged on home regimen pending inpatient course and steroid taper. Doses TBD.  - Patient can f/u with Endocrine practice as follows  [address], [address]  [place] [address]  [phone]  -Needs Optho follow up  -Plan discussed with pt/team.  Contact info: [phone] (24/7). pager [phone]

## 2021-02-08 NOTE — CHART NOTE - NSCHARTNOTEFT_GEN_A_CORE
-Informed by RN of pt. FS to be 55  -Pt given total of 100 mL D5% in Water per available hypoglycemia order, and FS corrected to 130, and most currently at 126.  -Pt is scheduled for Lantus 36 units at HS, in light of hypoglycemic episode, will give 18 units Lantus tonight  -Will continue to monitor FS q 6 Hrs. as pt. is now NPO  -Will endorse to day team

## 2021-02-08 NOTE — PROGRESS NOTE ADULT - PROBLEM SELECTOR PLAN 4
- currently on BIPAP post RRT in Am- pulm and MICU eval appreciated.  - Likely in the setting of worsening COVID infection  - CXR opacities worsening. Unlikely PE (low d-dimer). Unlikely bacterial infection (low procalcitonin). Unlikely CHF (low proBNP).

## 2021-02-08 NOTE — PROGRESS NOTE ADULT - SUBJECTIVE AND OBJECTIVE BOX
Neurology Progress Note    S: Patient seen and examined in covid unit on airborn and contact isolation. on HiFlo, RRT today for hypoxia.     Medication:  MEDICATIONS  (STANDING):  ALBUTerol    90 MICROgram(s) HFA Inhaler 1 Puff(s) Inhalation every 6 hours  apixaban 5 milliGRAM(s) Oral two times a day  atorvastatin 80 milliGRAM(s) Oral at bedtime  dexAMETHasone  Injectable 6 milliGRAM(s) IV Push daily  dextrose 40% Gel 15 Gram(s) Oral once  dextrose 5%. 1000 milliLiter(s) (50 mL/Hr) IV Continuous <Continuous>  dextrose 5%. 1000 milliLiter(s) (100 mL/Hr) IV Continuous <Continuous>  dextrose 50% Injectable 25 Gram(s) IV Push once  dextrose 50% Injectable 12.5 Gram(s) IV Push once  dextrose 50% Injectable 25 Gram(s) IV Push once  glucagon  Injectable 1 milliGRAM(s) IntraMuscular once  insulin glargine Injectable (LANTUS) 36 Unit(s) SubCutaneous at bedtime  insulin lispro (ADMELOG) corrective regimen sliding scale   SubCutaneous three times a day before meals  insulin lispro (ADMELOG) corrective regimen sliding scale   SubCutaneous <User Schedule>  insulin lispro Injectable (ADMELOG) 48 Unit(s) SubCutaneous three times a day before meals  pantoprazole    Tablet 40 milliGRAM(s) Oral before breakfast  polyethylene glycol 3350 17 Gram(s) Oral daily    MEDICATIONS  (PRN):  acetaminophen   Tablet .. 650 milliGRAM(s) Oral every 6 hours PRN Mild Pain (1 - 3)  bisacodyl Suppository 10 milliGRAM(s) Rectal daily PRN Constipation          Vitals:  ICU Vital Signs Last 24 Hrs  T(C): 37.4 (08 Feb 2021 03:55), Max: 37.4 (08 Feb 2021 03:55)  T(F): 99.3 (08 Feb 2021 03:55), Max: 99.3 (08 Feb 2021 03:55)  HR: 100 (08 Feb 2021 09:35) (100 - 113)  BP: 116/75 (08 Feb 2021 03:55) (116/75 - 138/91)  BP(mean): --  ABP: --  ABP(mean): --  RR: 20 (08 Feb 2021 05:59) (20 - 28)  SpO2: 92% (08 Feb 2021 09:35) (89% - 92%)        General Exam:   General Appearance: Appropriately dressed and in no acute distress       Head: Normocephalic, atraumatic and no dysmorphic features  Ear, Nose, and Throat: Moist mucous membranes HiFLo  CVS: S1S2+  Resp: No SOB, no wheeze or rhonchi on NRBM  Abd: soft NTND  Extremities: No edema, no cyanosis  Skin: No bruises, no rashes     Neurological Exam:  Mental Status: Awake, alert and oriented x 2-3.  Able to follow simple and complex verbal commands. Able to name and repeat. fluent speech. No obvious aphasia or dysarthria noted.   Cranial Nerves: PERRL, EOMI, VFFC, sensation V1-V3 intact,  mild R facial asymmetry , equal elevation of palate, scm/trap 5/5, tongue is midline on protrusion. no obvious papilledema on fundoscopic exam. Hearing is grossly intact.   Motor: RUE and RLE 4/5, LUE/LLE 5/5   Sensation: Intact to light touch and pinprick throughout.   Reflexes: 1+ throughout at biceps, brachioradialis, triceps, patellars and ankles bilaterally and equal. No clonus. R toe and L toe were both downgoing.  Coordination: No dysmetria on FNF   Gait: deferred     I personally reviewed the below data/images/labs:    CBC Full  -  ( 08 Feb 2021 06:43 )  WBC Count : 19.20 K/uL  RBC Count : 4.50 M/uL  Hemoglobin : 12.8 g/dL  Hematocrit : 39.2 %  Platelet Count - Automated : 295 K/uL  Mean Cell Volume : 87.1 fl  Mean Cell Hemoglobin : 28.4 pg  Mean Cell Hemoglobin Concentration : 32.7 gm/dL  Auto Neutrophil # : x  Auto Lymphocyte # : x  Auto Monocyte # : x  Auto Eosinophil # : x  Auto Basophil # : x  Auto Neutrophil % : x  Auto Lymphocyte % : x  Auto Monocyte % : x  Auto Eosinophil % : x  Auto Basophil % : x    02-08    147<H>  |  109<H>  |  31<H>  ----------------------------<  151<H>  4.4   |  28  |  1.05    Ca    8.9      08 Feb 2021 06:43    TPro  6.7  /  Alb  3.1<L>  /  TBili  0.4  /  DBili  x   /  AST  45<H>  /  ALT  46<H>  /  AlkPhos  77  02-08      Radiology:  -CT Head - unremarkable  -CTA head and neck 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    < from: MR Angio Neck w/wo IV Cont (02.02.21 @ 00:16) >    EXAM:  MR ANGIO BRAIN                          EXAM:  MR ANGIO NECK WAW IC                          EXAM:  MR BRAIN                            PROCEDURE DATE:  02/02/2021            INTERPRETATION:  CLINICAL INDICATION: COVID Positive, basilar stenosis, 70-year-old female with stroke, right-sided weakness and speech for 3 days, altered mental status,    Technique: Contrast brain MRI, non-contrast brain MRA and contrast neck MRA was performed.    Through the brain, sagittal and axial T1, axial T2, FLAIR, diffusion weighted images and an ADC map were obtained. Axial T1 post-contrast images were obtained and reconstructed in sagittal and coronal planes. 10 cc of intravenous gadolinium Gadavist gadolinium was administered and 0 discarded,  for contrast MRI brain and contrast enhanced MR angiography of the extracranial circulation.    MR angiography of intracranial and extracranial circulation was performed with time of flight imaging technique. Maximal intensity projection images were reviewed in multiple planes.    COMPARISON: Head CT, CT perfusion, CTA brain and neck, 1/30/2021    FINDINGS:  Brain MRI:  Foci restricted diffusion with hyperintense DWI, T2, FLAIR signal, left parietal lobe just above the occipital lobe (4:20, 400:20) concerning for new ischemia without hemorrhagic transformation. There is moderate enlargement of the ventricles and sulci greater than expected for age consistent with volume loss. There are foci of encephalomalacia including not limited to the corpus callosum genu and body (3:13). There is encephalomalacia with gliosis along the central and left paramedian goran, correlate with any prior sequela of remote ischemia (7:10, 6:9) with Wallerian degeneration  left cerebral peduncle, goran, and medullary pyramid (7:11-7). There is nonspecific foci of T2/FLAIR signal hyperintensity in the hemispheric white matter, likely microvascular disease, infectious and/or inflammatory etiologies in patient with COVID positivity not excluded with faint FLAIR hyperintensity noted along the bilateral olfactory gyri. There is a partially empty sella. No intraparenchymal hematoma, mass, extra-axial collection or midline shift. Basal cisterns remain patent.    Intact calvarium. Orbits and mastoids are unremarkable. Left maxillary mucosal thickening, with retention cyst and polyps, with mild ethmoid, frontal and sphenoid mucosal thickening.      Brain MRA:  Motion limited MRA,, calcified noncalcified plaque, causes multifocal stenosis bilateral cavernous and supraclinoid ICAs with 3.9 x 2.6 x 2.3 mm AP, TR, CC blister aneurysm extending inferiorly  right internal common carotid artery unchanged. There is flow-related signal  bilateral ICA cavernous and supraclinoid segments with  multifocal stenosis and poststenotic dilatation. Stenosis the right proximal right A1 and M1 segments (6:77). Redemonstration, stenosis and narrowing along the distal right M1, decreased right MCA branches, patent left A1, proximal A2 segments with multifocal stenosis anterior cerebral arteries. Stenosis distal left M1, multifocal stenosis distal left MCA branches, assessment limited by motion artifact.    Fetal bilateral PCAs, dominant anterior circulation, right vertebral artery crosses leftward, vertebral arteries are patent to vertebrobasilar junction, severe stenosis proximal basilar artery  above the vertebrobasilar junction with reconstitution distal basilar via P1 segments and fetal PCA's, multifocal stenosis PCA branch vessels.    Neck MRA:  The aortic arch and origins of the great vessels origins are patent..    Common and Internal Carotids: Tortuous bilateral common carotid arteries with retropharyngeal extension consistent with kissing carotids (5:35) no hemodynamically significant stenosis by NASCET criteria along origin of either right or left internal or external carotid artery.    Vertebral arteries:  Tortuous course and caliber of the bilateral vertebral arteries correlate with hypertension vessels are patent to intracranial V4 segments.    IMPRESSION:    Foci  restricted diffusion, DWI hyperintensity left inferior parietal lobe, no hemorrhagic transformation. Multifocal  T2 and FLAIR hyperintensity white matter may reflect microvascular disease,  additional etiologies not excluded with  COVIDpositivity.    Volume loss, multifocal encephalomalacia  corpus callosum body and splenium, Wallerian degeneration, left paramedian goran, medullary pyramid, no new hemorrhage or midline shift.    Severe flow limiting stenosis of the proximal basilar artery above the vertebral basilar junction with fetal origin PCAs, reconstitution of the distal basilar artery via the bilateral P1 segments.    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    Tortuous extracranial vessels likely from hypertension without hemodynamic significant stenosis at ICA origins by NASCET criteria. Tortuous vertebral arteries patent to V4 segments.    Findings discussed with Dr. Butler of neurology, at immediate time of review, 2/2/2021 at 9:10 AM.                JUDY NAILS MD; Attending Radiologist  This document has been electronically signed. Feb 2 2021  9:14AM    < end of copied text >

## 2021-02-08 NOTE — PROGRESS NOTE ADULT - SUBJECTIVE AND OBJECTIVE BOX
70y old  Female who presents with a chief complaint of CVA    Interval Events:   Had RRT this Am for hypoxia and transitioned to Bipap.  Pt arousable and following commands    REVIEW OF SYSTEMS:  Constitutional: no fever, chills, fatigue  Resp: + shortness of breath, improved with bipap  CVS: no chest pain, palpitations, leg swelling  GI: + abdominal pain, denies nausea, vomiting, diarrhea   Skin: no itching, burning, rashes, or lesions   Msk: no joint pain or swelling       OBJECTIVE:   Vital Signs Last 24 Hrs  T(C): 37.4 (08 Feb 2021 03:55), Max: 37.4 (08 Feb 2021 03:55)  T(F): 99.3 (08 Feb 2021 03:55), Max: 99.3 (08 Feb 2021 03:55)  HR: 100 (08 Feb 2021 09:35) (100 - 113)  BP: 116/75 (08 Feb 2021 03:55) (116/75 - 138/91)  BP(mean): --  RR: 20 (08 Feb 2021 05:59) (20 - 26)  SpO2: 92% (08 Feb 2021 09:35) (89% - 92%)    02-04 @ 07:01  -  02-05 @ 07:00  --------------------------------------------------------  IN: 0 mL / OUT: 2850 mL / NET: -2850 mL         PHYSICAL EXAM:  Gen: NAD sleeping but arousable  HEENT:  EOMI   CV: normal S1 & S2; regular rate and rhythm   Pulm: inspiratory rales bilaterally, no accessory muscle use  GI: soft; mild L sided tenderness, no rebound/guarding  Ext: no edema; pulses intact  Neuro:  moving all extremities but with decreased strength in RUE/RLE     MEDICATIONS  (STANDING):  ALBUTerol    90 MICROgram(s) HFA Inhaler 1 Puff(s) Inhalation every 6 hours  apixaban 5 milliGRAM(s) Oral two times a day  atorvastatin 80 milliGRAM(s) Oral at bedtime  dexAMETHasone  Injectable 6 milliGRAM(s) IV Push daily  dextrose 40% Gel 15 Gram(s) Oral once  dextrose 5%. 1000 milliLiter(s) (50 mL/Hr) IV Continuous <Continuous>  dextrose 5%. 1000 milliLiter(s) (100 mL/Hr) IV Continuous <Continuous>  dextrose 50% Injectable 25 Gram(s) IV Push once  dextrose 50% Injectable 12.5 Gram(s) IV Push once  dextrose 50% Injectable 25 Gram(s) IV Push once  glucagon  Injectable 1 milliGRAM(s) IntraMuscular once  insulin glargine Injectable (LANTUS) 36 Unit(s) SubCutaneous at bedtime  insulin lispro (ADMELOG) corrective regimen sliding scale   SubCutaneous three times a day before meals  insulin lispro (ADMELOG) corrective regimen sliding scale   SubCutaneous <User Schedule>  insulin lispro Injectable (ADMELOG) 48 Unit(s) SubCutaneous three times a day before meals  pantoprazole    Tablet 40 milliGRAM(s) Oral before breakfast  polyethylene glycol 3350 17 Gram(s) Oral daily        LABS:                            12.8   19.20 )-----------( 295      ( 08 Feb 2021 06:43 )             39.2   02-08    147<H>  |  109<H>  |  31<H>  ----------------------------<  151<H>  4.4   |  28  |  1.05    Ca    8.9      08 Feb 2021 06:43    TPro  6.7  /  Alb  3.1<L>  /  TBili  0.4  /  DBili  x   /  AST  45<H>  /  ALT  46<H>  /  AlkPhos  77  02-08           CXR (2/4): bilateral pulmonary opacities increased from prior study with stable elevation of right hemidiaphragm. No pleural effusion or pneumothorax    Right Lower Extremity Duplex: no evidence of DVT

## 2021-02-08 NOTE — PROGRESS NOTE ADULT - PROBLEM SELECTOR PLAN 1
- Hx of stroke in 2015 resulting in slurred speech, difficulty walking weakness on her right presenting with basilar stenosis with symptoms reminiscent of 2015 stroke (slurred speech and R sided weakness)  - Neurology follow up appreciated- suspected embolic stroke per neuro attending.  - Check TTE with bubble study (ordered, pending).   - MRA consisted with 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 Plan to follow up with neuro outpatient- may benefit from angiogram in the future (outpatient).   - Per neuro, APLS labs negative - cw Eliquis 5mg BID x 1 month followed by ASA 81 and plavix 75mg daily.  - Continue Atorvastatin 80mg qHS, goal LDL<70 (currently 150)  - Monitor on telemetry  - Neuro checks q4 on the floor  - Permissive HTN (goal -180) Has been at goal.  - PT/OT eval - recommended for NAYAN (daughters agreeable)- would need to wait for 10 days of COVID to elapse (2/6/2021)  - Tolerating diet

## 2021-02-08 NOTE — PROGRESS NOTE ADULT - ASSESSMENT
70F PMH HTN, HLD, DM2 (A1C 10), history of CVA with residual R-sided hemiparesis, and sciatica who initially presented with several days of slurred speech and R-sided weakness increased from baseline, found to have COVID-19 + E. coli UTI. Now with worsening hypoxemia due to progressive COVID-19 pneumonia. RRT this AM and now on bipap.    - pt doing better in bipap w/ rr 32 and Vt 300-350 and sat mid 90s%  -  post bipap abg looks better in ph and oxygenation  - titrate down fio2 as tolerated  - worsening resp failure like from covid   - No evidence of cardiogenic pulmonary edema given peripheral opacities, no pleural effusion, and normal BNP (41)     - S/p 5-day course of remdesevir  - Complete 10-day course of dexamethasone  - On therapeutic enoxaparin given recent stroke + COVID-19 infection   - cont to monitor resp status

## 2021-02-08 NOTE — PROGRESS NOTE ADULT - ATTENDING COMMENTS
Raquel Huber MD  Division of Hospital Medicine  Pager: [phone] Hem is dropping and patient is on Eliquis , so will cont to closely monitor and might need to ORDER a CT of abd if hem cont to decrease to R/O any bleed .  CBC every 12 hours       Raquel Huber MD  Division of Hospital Medicine  Pager: [phone]

## 2021-02-08 NOTE — PROGRESS NOTE ADULT - ASSESSMENT
69 yo F w/h/o uncontrolled DM2 (HbA1c 10.1). DM c/b neuropathy, CVA'15. Also HTN, HLD, chronic low back pain and sciatica. Transferred from [facility] for basilar artery stenosis, while in hospital found to have UTI and COVID-19. On Dexa for COVID  tx. Endocrine Team consulted for inpatient T2DM management. Pt had hypoxia after breakfast this am > RRT called and now requiring BIPAP >pt not a candidate for transfer to MICU at this time. BG levels in 300s ac lunch due to steroid effect but pt is now NPO with BG in 100s this pm. If pt able to eat she needs high insulin doses but in BIPAP she needs to be NPO so will only need correction Admelog doses. BG goal 100 to 200 while on steroids.     Spent 25 minutes assessing pt/labs/meds and discussing plan of care with primary team. Moderate complexity encounter on pt with uncontrolled T2DM with complications and comorbidities now with acute infection and steroids worsening glycemic control. Adjusting insulin >on BIPAP now.

## 2021-02-08 NOTE — PROGRESS NOTE ADULT - ASSESSMENT
69 yo RH F with HTN, HLD, DM stroke 2016 with slurred speech and R weakness found to have COVID 19 PNA as well as UTI.  sent to [facility] as CTA demonstrates severe BA focal stenosis.  A1c 10.1%, LDL 150mg/dL, MRI with L parietal infarct, MRA with severe BA stenosis and fetal PCAs and supraclinoid ICA blister aneurysm, also multifocal stenosis in  likely large artery athero. LA neg,  doubt vasculitis. no HA. Note Parietal infarct is anterior circulation not related to BA stenosis. DRVVT/LA neg. on HiFlo RRT today 2/8 for hypoxia  - f/u ABG  - consider r/o PE   - APLS labs including beta 2 glycoprotein, lupus anticoagulant (negative) and cardiolipin Abs (neg).  If APLS labs negative can place on Eliquis 5mg BID x 1 month followed by ASA 81 and plavix 75mg daily.  - suggest diagnostic angio with INR Dr. Hernandez - multifocal stenosis including severe BA stenosis, blister aneurysm.  will likely defer given COVID status and unlikely to change current management. will need to schedule in future. f/u with neurosx  - spoke with Nsx resident Dr. León requesting consult   - high dose statin, lipitor 80mg daily  - treat infection on remdesiver and decadron for covid infection  - monitor inflammatory markers.   - on decadron  - avoid hypotension -180 .   - PT/OT  - check FS, glucose control <180  - GI/DVT ppx  - Counseling on diet, exercise, and medication adherence was done  - Counseling on smoking cessation and alcohol consumption offered when appropriate.  - Pain assessed and judicious use of narcotics when appropriate was discussed.    - Stroke education given when appropriate.  - Importance of fall prevention discussed.   - Differential diagnosis and plan of care discussed with patient and/or family and primary team  - Thank you for allowing me to participate in the care of this patient. Call with questions.   Naga De Leon MD  Vascular Neurology  Office: [phone]

## 2021-02-08 NOTE — CHART NOTE - NSCHARTNOTEFT_GEN_A_CORE
Informed by RN of pt c/o Lt sided abdominal pain  Pt seen and examined at bedside this AM, and she related feeling Lt sided abdominal pain mainly in the lower quadrant, for "several weeks", worse when coughing, improved with Tylenol.   She denied having n/v    Vital Signs Last 24 Hrs  T(C): 37.4 (08 Feb 2021 03:55), Max: 37.4 (08 Feb 2021 03:55)  T(F): 99.3 (08 Feb 2021 03:55), Max: 99.3 (08 Feb 2021 03:55)  HR: 100 (08 Feb 2021 05:59) (100 - 113)  BP: 116/75 (08 Feb 2021 03:55) (116/75 - 138/91)  BP(mean): --  RR: 20 (08 Feb 2021 05:59) (20 - 28)  SpO2: 89% (08 Feb 2021 05:59) (89% - 91%)    On Exam:  -Abdomen soft, + BS in all four quadrants. Diffuse pain appreciated  b/l upper quadrants, but more intense LLQ. No rebound tenderness, no distention. No flank pain b/l    PLAN:  >Continue to monitor  >Will obtain an abdominal Xray - Urgent portable, to look for any abnormal findings  >Endorsed to day team; follow up per Attending

## 2021-02-08 NOTE — PROGRESS NOTE ADULT - SUBJECTIVE AND OBJECTIVE BOX
DIABETES FOLLOW UP NOTE: Saw pt earlier today  INTERVAL HX: 71 yo F w/h/o uncontrolled DM2 (HbA1c 10.1). DM c/b neuropathy, CVA'15. Also HTN, HLD, chronic low back pain and sciatica. Transferred from [facility] for basilar artery stenosis, while in hospital found to have UTI and COVID-19. On Dexa for COVID  tx. Endocrine Team consulted for inpatient T2DM management. Pt had hypoxia after breakfast this am > RRT called and now requiring BIPAP >pt not a candidate for transfer to MICU at this time. BG levels in 300s ac lunch due to steroid effect but pt is now NPO with BG in 100s this pm. Pt alert but unable to speak much>asking for water.        Review of Systems:  General: As above, able to nod  Cardiovascular: No chest pain, palpitations  Respiratory: + SOB, no cough  GI: No nausea, vomiting, abdominal pain  Endocrine: no polyuria, polydipsia or S&Sx of hypoglycemia    Allergies    No Known Allergies    Intolerances      MEDICATIONS:  atorvastatin 80 milliGRAM(s) Oral at bedtime  cholecalciferol 400 Unit(s) Oral daily  dexAMETHasone  Injectable 6 milliGRAM(s) IV Push daily  insulin glargine Injectable (LANTUS) 36 Unit(s) SubCutaneous at bedtime  insulin lispro (ADMELOG) corrective regimen sliding scale   SubCutaneous three times a day before meals  insulin lispro (ADMELOG) corrective regimen sliding scale   SubCutaneous <User Schedule>  insulin lispro Injectable (ADMELOG) 48 Unit(s) SubCutaneous three times a day before meals        PHYSICAL EXAM:  VITALS: T(C): 36.4 (02-08-21 @ 11:06)  T(F): 97.6 (02-08-21 @ 11:06), Max: 99.3 (02-08-21 @ 03:55)  HR: 114 (02-08-21 @ 11:06) (100 - 114)  BP: 141/88 (02-08-21 @ 11:06) (116/75 - 141/88)  RR:  (20 - 31)  SpO2:  (89% - 98%)  Wt(kg): --  GENERAL: Female laying in bed > Appears tired and with difficulty talking.   Abdomen: Soft, nontender, non distended, obese  Extremities: Warm, no edema in all 4 exts  NEURO: A&O X3    LABS:  POCT Blood Glucose.: 165 mg/dL (02-08-21 @ 16:34)  POCT Blood Glucose.: 325 mg/dL (02-08-21 @ 12:25)  POCT Blood Glucose.: 325 mg/dL (02-08-21 @ 08:47)  POCT Blood Glucose.: 168 mg/dL (02-08-21 @ 07:48)  POCT Blood Glucose.: 229 mg/dL (02-08-21 @ 00:50)  POCT Blood Glucose.: 79 mg/dL (02-07-21 @ 21:17)  POCT Blood Glucose.: 195 mg/dL (02-07-21 @ 16:55)  POCT Blood Glucose.: 252 mg/dL (02-07-21 @ 11:43)  POCT Blood Glucose.: 162 mg/dL (02-07-21 @ 07:47)  POCT Blood Glucose.: 105 mg/dL (02-07-21 @ 01:31)  POCT Blood Glucose.: 244 mg/dL (02-06-21 @ 21:05)  POCT Blood Glucose.: 257 mg/dL (02-06-21 @ 16:56)  POCT Blood Glucose.: 377 mg/dL (02-06-21 @ 12:02)  POCT Blood Glucose.: 194 mg/dL (02-06-21 @ 07:47)  POCT Blood Glucose.: 153 mg/dL (02-06-21 @ 02:11)  POCT Blood Glucose.: 385 mg/dL (02-05-21 @ 21:18)  POCT Blood Glucose.: 276 mg/dL (02-05-21 @ 17:02)                            12.7   17.13 )-----------( 325      ( 08 Feb 2021 14:47 )             38.7       02-08    147<H>  |  109<H>  |  31<H>  ----------------------------<  151<H>  4.4   |  28  |  1.05    EGFR if : 62      Ca    8.9      02-08    TPro  6.7  /  Alb  3.1<L>  /  TBili  0.4  /  DBili  x   /  AST  45<H>  /  ALT  46<H>  /  AlkPhos  77  02-08      A1C with Estimated Average Glucose Result: 10.1 % (01-31-21 @ 09:13)      Estimated Average Glucose: 243 mg/dL (01-31-21 @ 09:13)        01-31 Chol 227<H> LDL -- HDL 44<L> Trig 164<H>

## 2021-02-08 NOTE — PROGRESS NOTE ADULT - SUBJECTIVE AND OBJECTIVE BOX
Patient is a 70y old  Female who presents with a chief complaint of CVA (08 Feb 2021 12:09)      SUBJECTIVE / OVERNIGHT EVENTS:    Post RRT due to hypoxemia       ADDITIONAL REVIEW OF SYSTEMS: Negative except for above    MEDICATIONS  (STANDING):  ALBUTerol    90 MICROgram(s) HFA Inhaler 1 Puff(s) Inhalation every 6 hours  apixaban 5 milliGRAM(s) Oral two times a day  atorvastatin 80 milliGRAM(s) Oral at bedtime  dexAMETHasone  Injectable 6 milliGRAM(s) IV Push daily  dextrose 40% Gel 15 Gram(s) Oral once  dextrose 5%. 1000 milliLiter(s) (50 mL/Hr) IV Continuous <Continuous>  dextrose 5%. 1000 milliLiter(s) (100 mL/Hr) IV Continuous <Continuous>  dextrose 50% Injectable 25 Gram(s) IV Push once  dextrose 50% Injectable 12.5 Gram(s) IV Push once  dextrose 50% Injectable 25 Gram(s) IV Push once  glucagon  Injectable 1 milliGRAM(s) IntraMuscular once  insulin glargine Injectable (LANTUS) 36 Unit(s) SubCutaneous at bedtime  insulin lispro (ADMELOG) corrective regimen sliding scale   SubCutaneous three times a day before meals  insulin lispro (ADMELOG) corrective regimen sliding scale   SubCutaneous <User Schedule>  insulin lispro Injectable (ADMELOG) 48 Unit(s) SubCutaneous three times a day before meals  pantoprazole    Tablet 40 milliGRAM(s) Oral before breakfast  polyethylene glycol 3350 17 Gram(s) Oral daily    MEDICATIONS  (PRN):  acetaminophen   Tablet .. 650 milliGRAM(s) Oral every 6 hours PRN Mild Pain (1 - 3)  bisacodyl Suppository 10 milliGRAM(s) Rectal daily PRN Constipation      CAPILLARY BLOOD GLUCOSE      POCT Blood Glucose.: 325 mg/dL (08 Feb 2021 12:25)  POCT Blood Glucose.: 325 mg/dL (08 Feb 2021 08:47)  POCT Blood Glucose.: 168 mg/dL (08 Feb 2021 07:48)  POCT Blood Glucose.: 229 mg/dL (08 Feb 2021 00:50)  POCT Blood Glucose.: 79 mg/dL (07 Feb 2021 21:17)  POCT Blood Glucose.: 195 mg/dL (07 Feb 2021 16:55)    I&O's Summary    07 Feb 2021 07:01  -  08 Feb 2021 07:00  --------------------------------------------------------  IN: 360 mL / OUT: 1000 mL / NET: -640 mL        PHYSICAL EXAM:  Vital Signs Last 24 Hrs  T(C): 37.4 (08 Feb 2021 03:55), Max: 37.4 (08 Feb 2021 03:55)  T(F): 99.3 (08 Feb 2021 03:55), Max: 99.3 (08 Feb 2021 03:55)  HR: 100 (08 Feb 2021 09:35) (100 - 113)  BP: 116/75 (08 Feb 2021 03:55) (116/75 - 129/86)  BP(mean): --  RR: 20 (08 Feb 2021 05:59) (20 - 26)  SpO2: 92% (08 Feb 2021 09:35) (89% - 92%)    PHYSICAL EXAM:      LABS:                        12.8   19.20 )-----------( 295      ( 08 Feb 2021 06:43 )             39.2     02-08    147<H>  |  109<H>  |  31<H>  ----------------------------<  151<H>  4.4   |  28  |  1.05    Ca    8.9      08 Feb 2021 06:43    TPro  6.7  /  Alb  3.1<L>  /  TBili  0.4  /  DBili  x   /  AST  45<H>  /  ALT  46<H>  /  AlkPhos  77  02-08                RADIOLOGY & ADDITIONAL TESTS:    Imaging Personally Reviewed:    Electrocardiogram Personally Reviewed:    COORDINATION OF CARE:  Care Discussed with Consultants/Other Providers [Y/N]:  Prior or Outpatient Records Reviewed [Y/N]:

## 2021-02-08 NOTE — PROGRESS NOTE ADULT - ASSESSMENT
70F with HTN, HLD, DMT2, sciatica, and history of stroke in 2015 resulting in slurred speech, difficulty walking and weakness on her right side, presenting as a transfer from [facility] (MR [id number]) for basilar artery stenosis in the setting of slurred speech and R-sided weakness. Transferred for possible endovascular intervention. Also admitted for sepsis 2/2 UTI and acute respiratory failure with hypoxia 2/2 COVID-19 infection.

## 2021-02-09 NOTE — PROGRESS NOTE ADULT - PROBLEM SELECTOR PLAN 4
- currently on BIPAP post RRT on 2/8- pulm and MICU eval appreciated.  - Likely in the setting of worsening COVID infection  - CXR opacities worsening. Unlikely PE (low d-dimer). Unlikely bacterial infection (low procalcitonin). Unlikely CHF (low proBNP).  - cont to closely watch.  As per Nurse, pt removes the HFNC_ but she is on BIPAP now

## 2021-02-09 NOTE — PROGRESS NOTE ADULT - ASSESSMENT
69 yo RH F with HTN, HLD, DM stroke 2016 with slurred speech and R weakness found to have COVID 19 PNA as well as UTI.  sent to [facility] as CTA demonstrates severe BA focal stenosis.  A1c 10.1%, LDL 150mg/dL, MRI with L parietal infarct, MRA with severe BA stenosis and fetal PCAs and supraclinoid ICA blister aneurysm, also multifocal stenosis in  likely large artery athero. LA neg,  doubt vasculitis. no HA. Note Parietal infarct is anterior circulation not related to BA stenosis. DRVVT/LA neg. on HiFlo RRT 2/8 for hypoxia  - on BIPAP, monitor resp status  - APLS labs including beta 2 glycoprotein, lupus anticoagulant (negative) and cardiolipin Abs (neg).  If APLS labs negative can place on Eliquis 5mg BID x 1 month followed by ASA 81 and plavix 75mg daily.  - suggest diagnostic angio with INR Dr. Hernandez - multifocal stenosis including severe BA stenosis, blister aneurysm.  will likely defer given COVID status and unlikely to change current management. will need to schedule in future. f/u with neurosx  - spoke with Nsx resident Dr. León requesting consult   - high dose statin, lipitor 80mg daily  - treat infection on remdesiver and decadron for covid infection  - monitor inflammatory markers.   - on decadron  - avoid hypotension -180 .   - PT/OT  - check FS, glucose control <180  - GI/DVT ppx  - Counseling on diet, exercise, and medication adherence was done  - Counseling on smoking cessation and alcohol consumption offered when appropriate.  - Pain assessed and judicious use of narcotics when appropriate was discussed.    - Stroke education given when appropriate.  - Importance of fall prevention discussed.   - Differential diagnosis and plan of care discussed with patient and/or family and primary team  - Thank you for allowing me to participate in the care of this patient. Call with questions.   Naga De Leon MD  Vascular Neurology  Office: [phone]

## 2021-02-09 NOTE — PROGRESS NOTE ADULT - SUBJECTIVE AND OBJECTIVE BOX
Neurology Progress Note    S: Patient seen and examined in covid unit on airborn and contact isolation. RRT yesterday for hypoxia. no on BIPAP. hypoglycemia overnight.     Medication:  MEDICATIONS  (STANDING):  ALBUTerol    90 MICROgram(s) HFA Inhaler 1 Puff(s) Inhalation every 6 hours  apixaban 5 milliGRAM(s) Oral two times a day  ascorbic acid 500 milliGRAM(s) Oral daily  atorvastatin 80 milliGRAM(s) Oral at bedtime  cholecalciferol 400 Unit(s) Oral daily  dextrose 40% Gel 15 Gram(s) Oral once  dextrose 5%. 1000 milliLiter(s) (50 mL/Hr) IV Continuous <Continuous>  dextrose 5%. 1000 milliLiter(s) (100 mL/Hr) IV Continuous <Continuous>  dextrose 5%. 1000 milliLiter(s) (100 mL/Hr) IV Continuous <Continuous>  dextrose 50% Injectable 25 Gram(s) IV Push once  dextrose 50% Injectable 12.5 Gram(s) IV Push once  dextrose 50% Injectable 25 Gram(s) IV Push once  glucagon  Injectable 1 milliGRAM(s) IntraMuscular once  insulin glargine Injectable (LANTUS) 20 Unit(s) SubCutaneous at bedtime  insulin lispro (ADMELOG) corrective regimen sliding scale   SubCutaneous three times a day before meals  insulin lispro (ADMELOG) corrective regimen sliding scale   SubCutaneous <User Schedule>  multivitamin 1 Tablet(s) Oral daily  pantoprazole    Tablet 40 milliGRAM(s) Oral before breakfast  polyethylene glycol 3350 17 Gram(s) Oral daily    MEDICATIONS  (PRN):  acetaminophen   Tablet .. 650 milliGRAM(s) Oral every 6 hours PRN Mild Pain (1 - 3)  bisacodyl Suppository 10 milliGRAM(s) Rectal daily PRN Constipation        Vitals:  ICU Vital Signs Last 24 Hrs  T(C): 37 (09 Feb 2021 04:37), Max: 37 (09 Feb 2021 04:37)  T(F): 98.6 (09 Feb 2021 04:37), Max: 98.6 (09 Feb 2021 04:37)  HR: 118 (09 Feb 2021 09:37) (97 - 118)  BP: 128/76 (09 Feb 2021 04:37) (100/68 - 128/76)  BP(mean): --  ABP: --  ABP(mean): --  RR: 24 (09 Feb 2021 07:39) (24 - 26)  SpO2: 91% (09 Feb 2021 09:37) (91% - 94%)        General Exam:   General Appearance: Appropriately dressed and in no acute distress       Head: Normocephalic, atraumatic and no dysmorphic features  Ear, Nose, and Throat: Moist mucous membranes HiFLo  CVS: S1S2+  Resp: No SOB, no wheeze or rhonchi on NRBM  Abd: soft NTND  Extremities: No edema, no cyanosis  Skin: No bruises, no rashes     Neurological Exam:  Mental Status: Awake, alert and oriented x 2-3.  Able to follow simple and complex verbal commands. Able to name and repeat. fluent speech. No obvious aphasia or dysarthria noted.   Cranial Nerves: PERRL, EOMI, VFFC, sensation V1-V3 intact,  mild R facial asymmetry , equal elevation of palate, scm/trap 5/5, tongue is midline on protrusion. no obvious papilledema on fundoscopic exam. Hearing is grossly intact.   Motor: RUE and RLE 4/5, LUE/LLE 5/5   Sensation: Intact to light touch and pinprick throughout.   Reflexes: 1+ throughout at biceps, brachioradialis, triceps, patellars and ankles bilaterally and equal. No clonus. R toe and L toe were both downgoing.  Coordination: No dysmetria on FNF   Gait: deferred     I personally reviewed the below data/images/labs:    CBC Full  -  ( 09 Feb 2021 10:09 )  WBC Count : 16.39 K/uL  RBC Count : 4.31 M/uL  Hemoglobin : 12.2 g/dL  Hematocrit : 38.1 %  Platelet Count - Automated : 314 K/uL  Mean Cell Volume : 88.4 fl  Mean Cell Hemoglobin : 28.3 pg  Mean Cell Hemoglobin Concentration : 32.0 gm/dL  Auto Neutrophil # : 14.78 K/uL  Auto Lymphocyte # : 1.02 K/uL  Auto Monocyte # : 0.59 K/uL  Auto Eosinophil # : 0.00 K/uL  Auto Basophil # : 0.00 K/uL  Auto Neutrophil % : 87.5 %  Auto Lymphocyte % : 6.2 %  Auto Monocyte % : 3.6 %  Auto Eosinophil % : 0.0 %  Auto Basophil % : 0.0 %    02-09    145  |  106  |  29<H>  ----------------------------<  308<H>  4.6   |  26  |  1.14    Ca    8.7      09 Feb 2021 10:09    TPro  6.6  /  Alb  2.9<L>  /  TBili  0.7  /  DBili  x   /  AST  46<H>  /  ALT  46<H>  /  AlkPhos  90  02-09      Radiology:  -CT Head - unremarkable  -CTA head and neck 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    < from: MR Angio Neck w/wo IV Cont (02.02.21 @ 00:16) >    EXAM:  MR ANGIO BRAIN                          EXAM:  MR ANGIO NECK WAW IC                          EXAM:  MR BRAIN                            PROCEDURE DATE:  02/02/2021            INTERPRETATION:  CLINICAL INDICATION: COVID Positive, basilar stenosis, 70-year-old female with stroke, right-sided weakness and speech for 3 days, altered mental status,    Technique: Contrast brain MRI, non-contrast brain MRA and contrast neck MRA was performed.    Through the brain, sagittal and axial T1, axial T2, FLAIR, diffusion weighted images and an ADC map were obtained. Axial T1 post-contrast images were obtained and reconstructed in sagittal and coronal planes. 10 cc of intravenous gadolinium Gadavist gadolinium was administered and 0 discarded,  for contrast MRI brain and contrast enhanced MR angiography of the extracranial circulation.    MR angiography of intracranial and extracranial circulation was performed with time of flight imaging technique. Maximal intensity projection images were reviewed in multiple planes.    COMPARISON: Head CT, CT perfusion, CTA brain and neck, 1/30/2021    FINDINGS:  Brain MRI:  Foci restricted diffusion with hyperintense DWI, T2, FLAIR signal, left parietal lobe just above the occipital lobe (4:20, 400:20) concerning for new ischemia without hemorrhagic transformation. There is moderate enlargement of the ventricles and sulci greater than expected for age consistent with volume loss. There are foci of encephalomalacia including not limited to the corpus callosum genu and body (3:13). There is encephalomalacia with gliosis along the central and left paramedian goran, correlate with any prior sequela of remote ischemia (7:10, 6:9) with Wallerian degeneration  left cerebral peduncle, goran, and medullary pyramid (7:11-7). There is nonspecific foci of T2/FLAIR signal hyperintensity in the hemispheric white matter, likely microvascular disease, infectious and/or inflammatory etiologies in patient with COVID positivity not excluded with faint FLAIR hyperintensity noted along the bilateral olfactory gyri. There is a partially empty sella. No intraparenchymal hematoma, mass, extra-axial collection or midline shift. Basal cisterns remain patent.    Intact calvarium. Orbits and mastoids are unremarkable. Left maxillary mucosal thickening, with retention cyst and polyps, with mild ethmoid, frontal and sphenoid mucosal thickening.      Brain MRA:  Motion limited MRA,, calcified noncalcified plaque, causes multifocal stenosis bilateral cavernous and supraclinoid ICAs with 3.9 x 2.6 x 2.3 mm AP, TR, CC blister aneurysm extending inferiorly  right internal common carotid artery unchanged. There is flow-related signal  bilateral ICA cavernous and supraclinoid segments with  multifocal stenosis and poststenotic dilatation. Stenosis the right proximal right A1 and M1 segments (6:77). Redemonstration, stenosis and narrowing along the distal right M1, decreased right MCA branches, patent left A1, proximal A2 segments with multifocal stenosis anterior cerebral arteries. Stenosis distal left M1, multifocal stenosis distal left MCA branches, assessment limited by motion artifact.    Fetal bilateral PCAs, dominant anterior circulation, right vertebral artery crosses leftward, vertebral arteries are patent to vertebrobasilar junction, severe stenosis proximal basilar artery  above the vertebrobasilar junction with reconstitution distal basilar via P1 segments and fetal PCA's, multifocal stenosis PCA branch vessels.    Neck MRA:  The aortic arch and origins of the great vessels origins are patent..    Common and Internal Carotids: Tortuous bilateral common carotid arteries with retropharyngeal extension consistent with kissing carotids (5:35) no hemodynamically significant stenosis by NASCET criteria along origin of either right or left internal or external carotid artery.    Vertebral arteries:  Tortuous course and caliber of the bilateral vertebral arteries correlate with hypertension vessels are patent to intracranial V4 segments.    IMPRESSION:    Foci  restricted diffusion, DWI hyperintensity left inferior parietal lobe, no hemorrhagic transformation. Multifocal  T2 and FLAIR hyperintensity white matter may reflect microvascular disease,  additional etiologies not excluded with  COVIDpositivity.    Volume loss, multifocal encephalomalacia  corpus callosum body and splenium, Wallerian degeneration, left paramedian goran, medullary pyramid, no new hemorrhage or midline shift.    Severe flow limiting stenosis of the proximal basilar artery above the vertebral basilar junction with fetal origin PCAs, reconstitution of the distal basilar artery via the bilateral P1 segments.    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    Tortuous extracranial vessels likely from hypertension without hemodynamic significant stenosis at ICA origins by NASCET criteria. Tortuous vertebral arteries patent to V4 segments.    Findings discussed with Dr. Butler of neurology, at immediate time of review, 2/2/2021 at 9:10 AM.                JUDY NAILS MD; Attending Radiologist  This document has been electronically signed. Feb 2 2021  9:14AM    < end of copied text >

## 2021-02-09 NOTE — PROGRESS NOTE ADULT - SUBJECTIVE AND OBJECTIVE BOX
DIABETES FOLLOW UP NOTE: Saw pt earlier today  INTERVAL HX: 69 yo F w/h/o uncontrolled DM2 (HbA1c 10.1). DM c/b neuropathy, CVA'15. Also HTN, HLD, chronic low back pain and sciatica. Transferred from [facility] for basilar artery stenosis, while in hospital found to have UTI and COVID-19. On Dexa for COVID  tx. Endocrine Team consulted for inpatient T2DM management. Pt had RRT called yesterday and now is requiring BIPAP. Due to this pt remains NPO. Noted hypoglycemia yesterday while NPO  even though pt was not getting any premeal insulin. Received 50% of Lantus dose last night. Today pt with BG >300s at lunch time due to steroid effect. However pt received last dose of Dexa today. Pt alert but unable to talk much. Nods yes/no to simple questions. Yes to feeling hungry and feeling slightly better today.         Review of Systems:  General: As above  Cardiovascular: No chest pain, palpitations  Respiratory: + SOB, +cough  GI: No nausea, vomiting, abdominal pain  Endocrine: no polyuria, polydipsia or S&Sx of hypoglycemia    Allergies    No Known Allergies    Intolerances      MEDICATIONS:  atorvastatin 80 milliGRAM(s) Oral at bedtime  cholecalciferol 400 Unit(s) Oral daily  insulin glargine Injectable (LANTUS) 20 Unit(s) SubCutaneous at bedtime  insulin lispro (ADMELOG) corrective regimen sliding scale   SubCutaneous <User Schedule>  insulin lispro (ADMELOG) corrective regimen sliding scale   SubCutaneous three times a day before meals    PHYSICAL EXAM:  VITALS: T(C): 37.6 (02-09-21 @ 15:22)  T(F): 99.7 (02-09-21 @ 15:22), Max: 99.7 (02-09-21 @ 15:22)  HR: 113 (02-09-21 @ 15:48) (97 - 118)  BP: 126/86 (02-09-21 @ 15:22) (100/68 - 128/76)  RR:  (22 - 26)  SpO2:  (91% - 94%)  Wt(kg): --  GENERAL: Female laying in bed > Appears tired and with difficulty talking.   Abdomen: Soft, nontender, non distended, obese  Extremities: Warm, no edema in all 4 exts  NEURO: A&O X3    LABS:  POCT Blood Glucose.: 322 mg/dL (02-09-21 @ 12:15)  POCT Blood Glucose.: 180 mg/dL (02-09-21 @ 05:32)  POCT Blood Glucose.: 95 mg/dL (02-09-21 @ 01:51)  POCT Blood Glucose.: 126 mg/dL (02-08-21 @ 22:45)  POCT Blood Glucose.: 113 mg/dL (02-08-21 @ 22:44)  POCT Blood Glucose.: 130 mg/dL (02-08-21 @ 22:25)  POCT Blood Glucose.: 87 mg/dL (02-08-21 @ 22:07)  POCT Blood Glucose.: 55 mg/dL (02-08-21 @ 21:56)  POCT Blood Glucose.: 58 mg/dL (02-08-21 @ 21:41)  POCT Blood Glucose.: 59 mg/dL (02-08-21 @ 21:41)  POCT Blood Glucose.: 165 mg/dL (02-08-21 @ 16:34)  POCT Blood Glucose.: 325 mg/dL (02-08-21 @ 12:25)  POCT Blood Glucose.: 325 mg/dL (02-08-21 @ 08:47)  POCT Blood Glucose.: 168 mg/dL (02-08-21 @ 07:48)  POCT Blood Glucose.: 229 mg/dL (02-08-21 @ 00:50)  POCT Blood Glucose.: 79 mg/dL (02-07-21 @ 21:17)  POCT Blood Glucose.: 195 mg/dL (02-07-21 @ 16:55)  POCT Blood Glucose.: 252 mg/dL (02-07-21 @ 11:43)  POCT Blood Glucose.: 162 mg/dL (02-07-21 @ 07:47)  POCT Blood Glucose.: 105 mg/dL (02-07-21 @ 01:31)  POCT Blood Glucose.: 244 mg/dL (02-06-21 @ 21:05)                            12.2   16.39 )-----------( 314      ( 09 Feb 2021 10:09 )             38.1       02-09    145  |  106  |  29<H>  ----------------------------<  308<H>  4.6   |  26  |  1.14    EGFR if : 56<L>      Ca    8.7      02-09    TPro  6.6  /  Alb  2.9<L>  /  TBili  0.7  /  DBili  x   /  AST  46<H>  /  ALT  46<H>  /  AlkPhos  90  02-09        A1C with Estimated Average Glucose Result: 10.1 % (01-31-21 @ 09:13)      Estimated Average Glucose: 243 mg/dL (01-31-21 @ 09:13)        01-31 Chol 227<H> LDL -- HDL 44<L> Trig 164<H>

## 2021-02-09 NOTE — PROGRESS NOTE ADULT - ASSESSMENT
71 yo F w/h/o uncontrolled DM2 (HbA1c 10.1). DM c/b neuropathy, CVA'15. Also HTN, HLD, chronic low back pain and sciatica. Transferred from [facility] for basilar artery stenosis, while in hospital found to have UTI and COVID-19. On Dexa for COVID  tx. Endocrine Team consulted for inpatient T2DM management. Pt had RRT  yesterday and now is requiring BIPAP. Due to this pt remains NPO. Noted hypoglycemia yesterday while NPO  even though pt was not getting any premeal insulin. Received 50% of Lantus dose last night and also D5 infusion. BG >300s at lunch time today due to steroid effect. Received last dose of Dexa today so expecting BG to improve. Since pt unable to take POs will adjust Lantus dose weight base to keep BG goal 100s to 180s off steroids.       Spent 25 minutes assessing pt/labs/meds and discussing plan of care with primary team. Moderate complexity encounter on pt with uncontrolled T2DM with complications and comorbidities now with acute infection and steroids worsening glycemic control. Adjusting insulin >on BIPAP now. NPO.

## 2021-02-09 NOTE — PROGRESS NOTE ADULT - PROBLEM SELECTOR PLAN 6
- HgA1C 10.1. Discussed with daughter- the patient has significant dietary indiscretions.   - On insulin at home  - Endocrine helping  - Trend fingersticks

## 2021-02-09 NOTE — PROGRESS NOTE ADULT - PROBLEM SELECTOR PLAN 1
- Hx of stroke in 2015 resulting in slurred speech, difficulty walking weakness on her right presenting with basilar stenosis with symptoms reminiscent of 2015 stroke (slurred speech and R sided weakness)  - Neurology follow up appreciated- suspected embolic stroke per neuro attending.  - Check TTE with bubble study (ordered, pending)/ will try to get it done   - MRA consisted with 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 Plan to follow up with neuro outpatient- may benefit from angiogram in the future (outpatient).   - Per neuro, APLS labs negative - cw Eliquis 5mg BID x 1 month followed by ASA 81 and plavix 75mg daily.  - Continue Atorvastatin 80mg qHS, goal LDL<70 (currently 150)  - Monitor on telemetry  - Neuro checks q4 on the floor  - Permissive HTN (goal -180) Has been at goal.  - PT/OT eval - recommended for NAYAN (daughters agreeable)- would need to wait for 10 days of COVID to elapse (2/6/2021)  - Tolerating diet

## 2021-02-09 NOTE — PROGRESS NOTE ADULT - PROBLEM SELECTOR PLAN 1
- Test BG ac and hs  - C/w Lantus to 20 units sq qhs  - Discontinued Admelog  AC meals while NPO  - C/w Moderate correctional scale  but change frequency to q6h while NPO  -Please contact endo team with changes on steroid therapy/PO status!!  Discharge Plan:   - Patient may be able to be discharged on home regimen pending inpatient course and steroid taper. Doses TBD.  - Patient can f/u with Endocrine practice as follows  [address], [address]  [place] [address]  [phone]  -Needs Optho follow up  -Plan discussed with pt/team.  Contact info: [phone] (24/7). pager [phone]

## 2021-02-09 NOTE — PROGRESS NOTE ADULT - ASSESSMENT
70F with HTN, HLD, DMT2, sciatica, and history of stroke in 2015 resulting in slurred speech, difficulty walking and weakness on her right side, presenting as a transfer from [facility] (MR [id number]) for basilar artery stenosis in the setting of slurred speech and R-sided weakness. Transferred for possible endovascular intervention. Also admitted for sepsis 2/2 UTI and acute respiratory failure with hypoxia 2/2 COVID-19 infection.

## 2021-02-09 NOTE — PROGRESS NOTE ADULT - SUBJECTIVE AND OBJECTIVE BOX
Patient is a 70y old  Female who presents with a chief complaint of CVA (09 Feb 2021 11:44)      SUBJECTIVE / OVERNIGHT EVENTS:    Tele reviewed:       ADDITIONAL REVIEW OF SYSTEMS: Negative except for above    MEDICATIONS  (STANDING):  ALBUTerol    90 MICROgram(s) HFA Inhaler 1 Puff(s) Inhalation every 6 hours  apixaban 5 milliGRAM(s) Oral two times a day  ascorbic acid 500 milliGRAM(s) Oral daily  atorvastatin 80 milliGRAM(s) Oral at bedtime  cholecalciferol 400 Unit(s) Oral daily  dextrose 40% Gel 15 Gram(s) Oral once  dextrose 5%. 1000 milliLiter(s) (50 mL/Hr) IV Continuous <Continuous>  dextrose 5%. 1000 milliLiter(s) (100 mL/Hr) IV Continuous <Continuous>  dextrose 5%. 1000 milliLiter(s) (100 mL/Hr) IV Continuous <Continuous>  dextrose 50% Injectable 25 Gram(s) IV Push once  dextrose 50% Injectable 12.5 Gram(s) IV Push once  dextrose 50% Injectable 25 Gram(s) IV Push once  glucagon  Injectable 1 milliGRAM(s) IntraMuscular once  insulin glargine Injectable (LANTUS) 20 Unit(s) SubCutaneous at bedtime  insulin lispro (ADMELOG) corrective regimen sliding scale   SubCutaneous three times a day before meals  insulin lispro (ADMELOG) corrective regimen sliding scale   SubCutaneous <User Schedule>  multivitamin 1 Tablet(s) Oral daily  pantoprazole    Tablet 40 milliGRAM(s) Oral before breakfast  polyethylene glycol 3350 17 Gram(s) Oral daily    MEDICATIONS  (PRN):  acetaminophen   Tablet .. 650 milliGRAM(s) Oral every 6 hours PRN Mild Pain (1 - 3)  bisacodyl Suppository 10 milliGRAM(s) Rectal daily PRN Constipation      CAPILLARY BLOOD GLUCOSE      POCT Blood Glucose.: 322 mg/dL (09 Feb 2021 12:15)  POCT Blood Glucose.: 180 mg/dL (09 Feb 2021 05:32)  POCT Blood Glucose.: 95 mg/dL (09 Feb 2021 01:51)  POCT Blood Glucose.: 126 mg/dL (08 Feb 2021 22:45)  POCT Blood Glucose.: 113 mg/dL (08 Feb 2021 22:44)  POCT Blood Glucose.: 130 mg/dL (08 Feb 2021 22:25)  POCT Blood Glucose.: 87 mg/dL (08 Feb 2021 22:07)  POCT Blood Glucose.: 55 mg/dL (08 Feb 2021 21:56)  POCT Blood Glucose.: 58 mg/dL (08 Feb 2021 21:41)  POCT Blood Glucose.: 59 mg/dL (08 Feb 2021 21:41)  POCT Blood Glucose.: 165 mg/dL (08 Feb 2021 16:34)    I&O's Summary    08 Feb 2021 07:01  -  09 Feb 2021 07:00  --------------------------------------------------------  IN: 0 mL / OUT: 800 mL / NET: -800 mL        PHYSICAL EXAM:  Vital Signs Last 24 Hrs  T(C): 37 (09 Feb 2021 04:37), Max: 37 (09 Feb 2021 04:37)  T(F): 98.6 (09 Feb 2021 04:37), Max: 98.6 (09 Feb 2021 04:37)  HR: 118 (09 Feb 2021 09:37) (97 - 118)  BP: 128/76 (09 Feb 2021 04:37) (100/68 - 128/76)  BP(mean): --  RR: 24 (09 Feb 2021 07:39) (24 - 26)  SpO2: 91% (09 Feb 2021 09:37) (91% - 94%)    PHYSICAL EXAM:        LABS:                        12.2   16.39 )-----------( 314      ( 09 Feb 2021 10:09 )             38.1     02-09    145  |  106  |  29<H>  ----------------------------<  308<H>  4.6   |  26  |  1.14    Ca    8.7      09 Feb 2021 10:09    TPro  6.6  /  Alb  2.9<L>  /  TBili  0.7  /  DBili  x   /  AST  46<H>  /  ALT  46<H>  /  AlkPhos  90  02-09                RADIOLOGY & ADDITIONAL TESTS:    Imaging Personally Reviewed:    Electrocardiogram Personally Reviewed:    COORDINATION OF CARE:  Care Discussed with Consultants/Other Providers [Y/N]:  Prior or Outpatient Records Reviewed [Y/N]:     Patient is a 70y old  Female who presents with a chief complaint of CVA (09 Feb 2021 11:44)      SUBJECTIVE / OVERNIGHT EVENTS:   Nurse tried HFNC today , but patient removes the nasal prongs with resultant hypoxemia , pt is now back on BIPAP and saturating in the low 90's.  Hypoglycemic episodes last night.  She is a lot more awake today     ADDITIONAL REVIEW OF SYSTEMS: Negative except for above    MEDICATIONS  (STANDING):  ALBUTerol    90 MICROgram(s) HFA Inhaler 1 Puff(s) Inhalation every 6 hours  apixaban 5 milliGRAM(s) Oral two times a day  ascorbic acid 500 milliGRAM(s) Oral daily  atorvastatin 80 milliGRAM(s) Oral at bedtime  cholecalciferol 400 Unit(s) Oral daily  dextrose 40% Gel 15 Gram(s) Oral once  dextrose 5%. 1000 milliLiter(s) (50 mL/Hr) IV Continuous <Continuous>  dextrose 5%. 1000 milliLiter(s) (100 mL/Hr) IV Continuous <Continuous>  dextrose 5%. 1000 milliLiter(s) (100 mL/Hr) IV Continuous <Continuous>  dextrose 50% Injectable 25 Gram(s) IV Push once  dextrose 50% Injectable 12.5 Gram(s) IV Push once  dextrose 50% Injectable 25 Gram(s) IV Push once  glucagon  Injectable 1 milliGRAM(s) IntraMuscular once  insulin glargine Injectable (LANTUS) 20 Unit(s) SubCutaneous at bedtime  insulin lispro (ADMELOG) corrective regimen sliding scale   SubCutaneous three times a day before meals  insulin lispro (ADMELOG) corrective regimen sliding scale   SubCutaneous <User Schedule>  multivitamin 1 Tablet(s) Oral daily  pantoprazole    Tablet 40 milliGRAM(s) Oral before breakfast  polyethylene glycol 3350 17 Gram(s) Oral daily    MEDICATIONS  (PRN):  acetaminophen   Tablet .. 650 milliGRAM(s) Oral every 6 hours PRN Mild Pain (1 - 3)  bisacodyl Suppository 10 milliGRAM(s) Rectal daily PRN Constipation      CAPILLARY BLOOD GLUCOSE      POCT Blood Glucose.: 322 mg/dL (09 Feb 2021 12:15)  POCT Blood Glucose.: 180 mg/dL (09 Feb 2021 05:32)  POCT Blood Glucose.: 95 mg/dL (09 Feb 2021 01:51)  POCT Blood Glucose.: 126 mg/dL (08 Feb 2021 22:45)  POCT Blood Glucose.: 113 mg/dL (08 Feb 2021 22:44)  POCT Blood Glucose.: 130 mg/dL (08 Feb 2021 22:25)  POCT Blood Glucose.: 87 mg/dL (08 Feb 2021 22:07)  POCT Blood Glucose.: 55 mg/dL (08 Feb 2021 21:56)  POCT Blood Glucose.: 58 mg/dL (08 Feb 2021 21:41)  POCT Blood Glucose.: 59 mg/dL (08 Feb 2021 21:41)  POCT Blood Glucose.: 165 mg/dL (08 Feb 2021 16:34)    I&O's Summary    08 Feb 2021 07:01  -  09 Feb 2021 07:00  --------------------------------------------------------  IN: 0 mL / OUT: 800 mL / NET: -800 mL        PHYSICAL EXAM:  Vital Signs Last 24 Hrs  T(C): 37 (09 Feb 2021 04:37), Max: 37 (09 Feb 2021 04:37)  T(F): 98.6 (09 Feb 2021 04:37), Max: 98.6 (09 Feb 2021 04:37)  HR: 118 (09 Feb 2021 09:37) (97 - 118)  BP: 128/76 (09 Feb 2021 04:37) (100/68 - 128/76)  BP(mean): --  RR: 24 (09 Feb 2021 07:39) (24 - 26)  SpO2: 91% (09 Feb 2021 09:37) (91% - 94%)    PHYSICAL EXAM:  CONSTITUTIONAL:BIPAP in place, obese  EYES:  conjunctiva and sclera clear  large neck  RESPIRATORY: increased respiratory effort; lungs w/ scattered crackles  CARDIOVASCULAR: Regular rate and rhythm, normal S1 and S2, no murmur/rub/gallop; No lower extremity edema  ABDOMEN: pos discomfort in all quadrants wit no rebound , normoactive bowel sounds, no rebound/guarding; No hepatosplenomegaly  PSYCH: affect appropriate  NEUROLOGY: moving all extremities; no gross sensory deficits strength on L > R,  A+O to person, place, and time; a lot more awake today   SKIN: No rashes; no palpable lesions        LABS:                        12.2   16.39 )-----------( 314      ( 09 Feb 2021 10:09 )             38.1     02-09    145  |  106  |  29<H>  ----------------------------<  308<H>  4.6   |  26  |  1.14    Ca    8.7      09 Feb 2021 10:09    TPro  6.6  /  Alb  2.9<L>  /  TBili  0.7  /  DBili  x   /  AST  46<H>  /  ALT  46<H>  /  AlkPhos  90  02-09                RADIOLOGY & ADDITIONAL TESTS:    Imaging Personally Reviewed:    Electrocardiogram Personally Reviewed:    COORDINATION OF CARE:  Care Discussed with Consultants/Other Providers [Y/N]:  Prior or Outpatient Records Reviewed [Y/N]:

## 2021-02-10 NOTE — PROVIDER CONTACT NOTE (OTHER) - DATE AND TIME:
06-Feb-2021 21:00
07-Feb-2021 22:50
02-Feb-2021 21:15
05-Feb-2021 07:47
03-Feb-2021 20:00
06-Feb-2021 06:00
08-Feb-2021 21:53
31-Jan-2021 16:58
04-Feb-2021 03:50
08-Feb-2021 08:35
10-Feb-2021 15:15
09-Feb-2021 23:25
03-Feb-2021 22:00

## 2021-02-10 NOTE — CHART NOTE - NSCHARTNOTEFT_GEN_A_CORE
RRT called for Tachypnea in the 40s and increased work of breathing  on BIPAP 12/7 and 100% sats. BIPAP settings adjusted to 14/8 100% with no improvement. Mental status was also deteriorating - pt becoming lethargic. Dr. Huber had a long conversation with daughter and daughter wishes patient to be full code including intubation. Patient intubated and transferred to ICU    [id number].

## 2021-02-10 NOTE — PROVIDER CONTACT NOTE (OTHER) - ACTION/TREATMENT ORDERED:
Provider notified, pt with history of indigestion, maalox one time dose ordered , will continue to monitor closely.
Provider notified, follow hyperglycemia protocol with bedtime coverage, administer 34u lantus as ordered at bedtime, repeat fs in 2.5 hours. Will continue to monitor closely.
Provider notified, will give bedtime insulin coverage as ordered, will give Lantus 44u as ordered, recheck fs at 0200. Will continue to monitor closely.
ABGs ordered , pt on HFNC 60L 100% O2sat  89%-90%
Give Admelog as ordered ( 18 U given )
No interventions at this time. Provider will confer w day team. RN to continue to monitor pt.
Provider notified, ABG ordered, will continue to monitor closely.
follow hypoglycemia protocol, continue to monitor, notify provider of q15min FS.
CHEMA Mcbride, RRT team.
RRT team arrived, see RRT note
Provider notified, pt with history of indigestion, simethicone ordered, will continue to monitor closely.
tylenol 650mg given. continue to monitor pt, will reassess in am
tylenol 650mg x1 dose now

## 2021-02-10 NOTE — PROGRESS NOTE ADULT - PROBLEM SELECTOR PLAN 3
- Baseline AOx3, ambulates with cane as per daughter    - Likely multifactorial in the setting of basilar a. stenosis, sepsis, COVID   - Answering questions appropriately

## 2021-02-10 NOTE — PROGRESS NOTE ADULT - PROBLEM SELECTOR PLAN 1
- Test BG ac and hs  - Restart Lantus 20 units sq qhs    - C/w Moderate correctional scale q6h while NPO  - Change antibiotic to NS instead of D5  -If BG remains >200s consider insulin drip to keep  to 180s  -Please contact endo team with changes on steroid therapy/PO status!!  Discharge Plan:   - Patient may be able to be discharged on home regimen pending inpatient course and steroid taper. Doses TBD.  - Patient can f/u with Endocrine practice as follows  [address], [address]  [place] [address]  [phone]  -Needs Optho follow up  -Plan discussed with pt/team.  Contact info: [phone] (24/7). pager [phone]

## 2021-02-10 NOTE — PROGRESS NOTE ADULT - PROBLEM SELECTOR PLAN 2
- Hx of stroke in 2015 resulting in slurred speech, difficulty walking weakness on her right presenting with basilar stenosis with symptoms reminiscent of 2015 stroke (slurred speech and R sided weakness)  - Neurology follow up appreciated- suspected embolic stroke per neuro attending.  - Check TTE with bubble study (ordered, pending)/ spoke with the tech in Am and was scheduled for 2/10  - MRA consisted with 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 Plan to follow up with neuro outpatient- may benefit from angiogram in the future (outpatient).   - Per neuro, APLS labs negative - cw Eliquis 5mg BID x 1 month followed by ASA 81 and plavix 75mg daily.  - Continue Atorvastatin 80mg qHS, goal LDL<70 (currently 150)  - Monitor on telemetry  - Permissive HTN (goal -180) Has been at goal.  - PT/OT eval - recommended for NAYAN (daughters agreeable)- would need to wait for 10 days of COVID to elapse (2/6/2021)

## 2021-02-10 NOTE — PROVIDER CONTACT NOTE (OTHER) - RECOMMENDATIONS
Notify provider.
Notify provider.
tylenol 650mg
Notify provider, follow hyperglycemia protocol.
Notify Provider
Notify provider.
continue to monitor pt, will reassess in am
Notify provider. HFNC ? ABGs?
RN addressed w provider frequent hx of FS >400, PMH DM2 & CVAs.
follow hypoglycemia protocol
rrt called
Notify provider, ekg?
See RRT sheet.

## 2021-02-10 NOTE — CHART NOTE - NSCHARTNOTEFT_GEN_A_CORE
Nutrition Follow Up Note  Patient seen for: nutrition follow-up    Chart reviewed, events noted. This is a "70F with HTN, HLD, DMT2, sciatica, and history of stroke in 2015 resulting in slurred speech, difficulty walking and weakness on her right side, presenting as a transfer from [facility] (MR [id number]) for basilar artery stenosis in the setting of slurred speech and R-sided weakness. Transferred for possible endovascular intervention. Also admitted for sepsis 2/2 UTI and acute respiratory failure with hypoxia 2/2 COVID-19 infection." s/p RRT on 2/8 for hypoxia, placed on Bipap, made NPO. Endocrine following for glycemic management in setting of steroid use.       Source: medical record, unable to reach patient via phone despite multiple attempts, currently on Bipap    Diet : NPO (since 2/8)    Patient previously noted with good PO intake, consuming % of meals per nursing flow sheets and previous RD report. Seen by Speech Language Pathologist on 2/3, recommended to remain on Regular texture/ thin liquids with aspiration precautions. Pt currently NPO while on Bipap. No acute GI distress noted, last BM today 2/10.      PO intake : 0% (pt NPO x ~ 3 days)     Source for PO intake: chart review      Enteral /Parenteral Nutrition: N/A    Weights: no new weights to assess   99.8kg (1/30)    Unable to conduct a face to face interview or nutrition-focused physical exam due to limited contact restrictions related to patient's medical condition and isolation precaution.     Pertinent Medications: MEDICATIONS  (STANDING):  ALBUTerol    90 MICROgram(s) HFA Inhaler 1 Puff(s) Inhalation every 6 hours  apixaban 5 milliGRAM(s) Oral two times a day  ascorbic acid 500 milliGRAM(s) Oral daily  atorvastatin 80 milliGRAM(s) Oral at bedtime  cholecalciferol 400 Unit(s) Oral daily  dextrose 40% Gel 15 Gram(s) Oral once  dextrose 5%. 1000 milliLiter(s) (50 mL/Hr) IV Continuous <Continuous>  dextrose 5%. 1000 milliLiter(s) (100 mL/Hr) IV Continuous <Continuous>  dextrose 5%. 1000 milliLiter(s) (100 mL/Hr) IV Continuous <Continuous>  dextrose 50% Injectable 25 Gram(s) IV Push once  dextrose 50% Injectable 12.5 Gram(s) IV Push once  dextrose 50% Injectable 25 Gram(s) IV Push once  glucagon  Injectable 1 milliGRAM(s) IntraMuscular once  insulin glargine Injectable (LANTUS) 24 Unit(s) SubCutaneous at bedtime  insulin lispro (ADMELOG) corrective regimen sliding scale   SubCutaneous every 6 hours  multivitamin 1 Tablet(s) Oral daily  pantoprazole    Tablet 40 milliGRAM(s) Oral before breakfast  polyethylene glycol 3350 17 Gram(s) Oral daily    MEDICATIONS  (PRN):  acetaminophen   Tablet .. 650 milliGRAM(s) Oral every 6 hours PRN Mild Pain (1 - 3)  bisacodyl Suppository 10 milliGRAM(s) Rectal daily PRN Constipation    Pertinent Labs: 02-10 @ 06:09: Na 147<H>, BUN 28<H>, Cr 0.97, <H>, K+ 4.7, Phos --, Mg --, Alk Phos 108, ALT/SGPT 37, AST/SGOT 43<H>, HbA1c --    Finger Sticks:  POCT Blood Glucose.: 254 mg/dL (02-10 @ 12:16)  POCT Blood Glucose.: 283 mg/dL (02-10 @ 05:32)  POCT Blood Glucose.: 339 mg/dL (02-09 @ 23:09)  POCT Blood Glucose.: 345 mg/dL (02-09 @ 18:12)      Skin per nursing documentation: free of pressure injuries per nursing flow sheets   Edema: none     Estimated Needs:   [x ] no change since previous assessment  [ ] recalculated:     Previous Nutrition Diagnosis:   1) Altered Nutrition Related Lab Values.   2)  Food & Nutrition Related Knowledge Deficit.   Nutrition Diagnosis is: on going, to be addressed with therapeutic diet and diet education reinforcement as appropriate     New Nutrition Diagnosis: Inadequate protein-energy intake   Related to: related to inadequate diet order in setting of respiratory distress requiring Bipap   As evidenced by: pt NPO x 3 days      Interventions:   Recommend  1) Medical team to advance diet when medically feasible. Recommend resuming carbohydrate consistent + DASH diet. If NPO status > 7 days, consider alternate means of nutrition if within pt/family GOC.   2) Diet education reinforcement as appropriate   3) Continue micronutrient supplementation as able     Monitoring and Evaluation:     Continue to monitor Nutritional intake, Tolerance to diet prescription, weights, labs, skin integrity    RD remains available upon request and will follow up per protocol  Jayda Alvarez RD, CDN, Pager # [phone]

## 2021-02-10 NOTE — PROCEDURE NOTE - NSINDICATIONS_GEN_A_CORE
critical illness/emergency venous access/hemodynamic monitoring/venous access/volume resuscitation
arterial puncture to obtain ABG's/critical patient/monitoring purposes

## 2021-02-10 NOTE — PROVIDER CONTACT NOTE (OTHER) - ASSESSMENT
Pt RR 34 , O2 85-87% on NRB , other VS stable
Pt a&ox2, RR: 22, BP: 144/88, temp: 97.5 F, HR: 75, SpO2: 85% on 8L nasal cannula. Pt put in side lying position with no improvement, pt unable to tolerate prone position. Pt placed on 15L NRB satting 90-93%.
Pt a&ox2, VSS.
Pt a&ox2, denies distress, VSS. Finger stick 446, repeat fs 430.
Pt indicates burning pain at L temporal to posterior part of head, down neck and then to B/L LE and then at nares. Pt states pain only lasts for a few minutes. 8/10. Pt is calling out/moaning in discomfort.
VSS , pt denies blurred vision , headache n/v or abdominal pain
pt ao3, alert and awake. pt denies diaphoresis, lethargy, sob, dizziness or discomfort. pt on bipap, npo except medications. pt has 36 units of lantus ordered for bedtime.
A/Ox3, patient was feeling uncomfortable. Respirations were upto 50.
Pt a&ox2, c/o burning radiating from throat to sternum, pain 8/10, BP: 151/100, HR: 89, SpO2: 92% on 15L NRB, temp: 97.4 F.
Pain noted with palpation, no rebound tenderness. pt is  obese abdomen soft, non rigid. Postive bowel sounds noted, pt has bowel movement x 2 days ago.
Pt a&ox2, c/o burning radiating from throat to R sternum, pain 8/10. pt hx of indigestion and chest discomfort. pt on continuous bipap
left sided abd pain noted non radiating. no other s/s of distress
pt a&Ox2. FAROOQ/SOB, prone

## 2021-02-10 NOTE — PROGRESS NOTE ADULT - SUBJECTIVE AND OBJECTIVE BOX
DIABETES FOLLOW UP NOTE: Saw pt earlier today  INTERVAL HX: 69 yo F w/h/o uncontrolled DM2 (HbA1c 10.1). DM c/b neuropathy, CVA'15. Also HTN, HLD, chronic low back pain and sciatica. Transferred from [facility] for basilar artery stenosis, while in hospital found to have UTI and COVID-19. On Dexa for COVID  tx. Endocrine Team consulted for inpatient T2DM management. Pt on BIPAP and NPO. Glycemic control above goal with BG 200s to 300s while NPO. No hypoglycemia. Noted pt now in ICU intubated/sedated. Also on antibiotic for bacteremia > given in D5 infusion. Also noted Lantus discontinued and pt placed on Admelog scale only. Noted steroid were discontinued as well.      Review of Systems:  General: As above  Unable to talk this am on BIPAP    Allergies    No Known Allergies    Intolerances      MEDICATIONS:  atorvastatin 80 milliGRAM(s) Oral at bedtime  cholecalciferol 400 Unit(s) Oral daily  insulin lispro (ADMELOG) corrective regimen sliding scale   SubCutaneous every 6 hours  piperacillin/tazobactam IVPB. 3.375 Gram(s) IV Intermittent once  piperacillin/tazobactam IVPB.. 3.375 Gram(s) IV Intermittent every 8 hours        PHYSICAL EXAM:  VITALS: T(C): 37.2 (02-10-21 @ 11:32)  T(F): 98.9 (02-10-21 @ 11:32), Max: 98.9 (02-10-21 @ 11:32)  HR: 118 (02-10-21 @ 11:32) (105 - 118)  BP: 155/98 (02-10-21 @ 11:32) (135/87 - 155/98)  RR:  (20 - 20)  SpO2:  (91% - 97%)  Wt(kg): --  GENERAL: Female laying in bed > Appears tired and unable to talk   Abdomen: Soft, nontender, non distended, obese  Extremities: Warm, no edema in all 4 exts  NEURO: Alert but unable to communicate or follow instructions    LABS:  POCT Blood Glucose.: 260 mg/dL (02-10-21 @ 15:23)  POCT Blood Glucose.: 254 mg/dL (02-10-21 @ 12:16)  POCT Blood Glucose.: 283 mg/dL (02-10-21 @ 05:32)  POCT Blood Glucose.: 339 mg/dL (02-09-21 @ 23:09)  POCT Blood Glucose.: 345 mg/dL (02-09-21 @ 18:12)  POCT Blood Glucose.: 322 mg/dL (02-09-21 @ 12:15)  POCT Blood Glucose.: 180 mg/dL (02-09-21 @ 05:32)  POCT Blood Glucose.: 95 mg/dL (02-09-21 @ 01:51)  POCT Blood Glucose.: 126 mg/dL (02-08-21 @ 22:45)  POCT Blood Glucose.: 113 mg/dL (02-08-21 @ 22:44)  POCT Blood Glucose.: 130 mg/dL (02-08-21 @ 22:25)  POCT Blood Glucose.: 87 mg/dL (02-08-21 @ 22:07)  POCT Blood Glucose.: 55 mg/dL (02-08-21 @ 21:56)  POCT Blood Glucose.: 58 mg/dL (02-08-21 @ 21:41)  POCT Blood Glucose.: 59 mg/dL (02-08-21 @ 21:41)  POCT Blood Glucose.: 165 mg/dL (02-08-21 @ 16:34)  POCT Blood Glucose.: 325 mg/dL (02-08-21 @ 12:25)  POCT Blood Glucose.: 325 mg/dL (02-08-21 @ 08:47)  POCT Blood Glucose.: 168 mg/dL (02-08-21 @ 07:48)  POCT Blood Glucose.: 229 mg/dL (02-08-21 @ 00:50)  POCT Blood Glucose.: 79 mg/dL (02-07-21 @ 21:17)                            12.4   19.99 )-----------( 204      ( 10 Feb 2021 15:48 )             38.8       02-10    150<H>  |  112<H>  |  25<H>  ----------------------------<  292<H>  4.6   |  25  |  0.93    EGFR if : 72      Ca    8.9      02-10  Mg     2.8     02-10  Phos  2.7     02-10    TPro  6.7  /  Alb  3.1<L>  /  TBili  0.7  /  DBili  x   /  AST  63<H>  /  ALT  37  /  AlkPhos  132<H>  02-10      A1C with Estimated Average Glucose Result: 10.1 % (01-31-21 @ 09:13)      Estimated Average Glucose: 243 mg/dL (01-31-21 @ 09:13)        01-31 Chol 227<H> LDL -- HDL 44<L> Trig 164<H>

## 2021-02-10 NOTE — PROGRESS NOTE ADULT - PROBLEM SELECTOR PLAN 5
- Diagnosed 1/30  - S/P decadron x10 days. Completed remdesivir  - Oxygen supplementation as above  - Trend inflammatory markers (improving)/ increasing DD / neg Lower ext doppler/ Pt is currently on Eliquis

## 2021-02-10 NOTE — PROGRESS NOTE ADULT - ASSESSMENT
71 yo RH F with HTN, HLD, DM stroke 2016 with slurred speech and R weakness found to have COVID 19 PNA as well as UTI.  sent to [facility] as CTA demonstrates severe BA focal stenosis.  A1c 10.1%, LDL 150mg/dL, MRI with L parietal infarct, MRA with severe BA stenosis and fetal PCAs and supraclinoid ICA blister aneurysm, also multifocal stenosis in  likely large artery athero. LA neg,  doubt vasculitis. no HA. Note Parietal infarct is anterior circulation not related to BA stenosis. DRVVT/LA neg. on HiFlo RRT 2/8 for hypoxia  - on BIPAP, monitor resp status  - APLS labs including beta 2 glycoprotein, lupus anticoagulant (negative) and cardiolipin Abs (neg).  If APLS labs negative can place on Eliquis 5mg BID x 1 month followed by ASA 81 and plavix 75mg daily.  - suggest diagnostic angio with INR Dr. Hernandez - multifocal stenosis including severe BA stenosis, blister aneurysm.  will likely defer given COVID status and unlikely to change current management. will need to schedule in future. f/u with neurosx  - spoke with Nsx resident Dr. León requesting consult   - high dose statin, lipitor 80mg daily  - treat infection s/p remdesiver and decadron for covid infection  - monitor inflammatory markers.   - on decadron  - avoid hypotension  - PT/OT  - check FS, glucose control <180  - GI/DVT ppx  - Counseling on diet, exercise, and medication adherence was done  - Counseling on smoking cessation and alcohol consumption offered when appropriate.  - Pain assessed and judicious use of narcotics when appropriate was discussed.    - Stroke education given when appropriate.  - Importance of fall prevention discussed.   - Differential diagnosis and plan of care discussed with patient and/or family and primary team  - Thank you for allowing me to participate in the care of this patient. Call with questions.   Naga De Leon MD  Vascular Neurology  Office: [phone]

## 2021-02-10 NOTE — PROVIDER CONTACT NOTE (OTHER) - BACKGROUND
Pt admitted for occlusion and stenosis of vertebral artery. PMH: DM, HTN.
pt admitted for CVA and covid
COVID
CVA, COVID, UTI   Patient is a 70-year-old female, past medical history significant for HTN, HLD, DM2, sciatica, and history of stroke in 2015 resulting in slurred speech
Pt admitted for occlusion and stenosis of vertebral artery. PMH: DM, HTN.
Pt admitted for occlusion and stenosis of vertebral artey, covid, UTI. PMH: DM, HTN, HLD.
covid +, cva 2/2 ba stenosis, sepsis 2/2 uti
pt 70 year old patient, hx of DM2, A1c 10.1.
COVID - Remdesivir 1/31-2/4, Decadron 1/31-2/9. NRB -> High kamari         Hyperglycemia - DM w/ steroid induced , : CVA 2/2 BA stenosis
Dx: CVA, COVID, UTI
Pt admitted for occlusion and stenosis of vertebral artery. PMH: DM, CVA, HTN, HLD.
covid+, hyperglycemia, cva 2/2 ba stenosis
Pt admitted for stenosis of vertebral artery, covid, UTI. PMH: DM, HTN, HLD. Pt previously on 3L nasal cannula. Sustaining 83-85% with titration to 5L, 6L and 8L O2.

## 2021-02-10 NOTE — PROVIDER CONTACT NOTE (OTHER) - SITUATION
RRT called for distress and tachypnea
pt c/o of indigestion
pt hypoxic in the 80s
Pt c/o left sided abdominal pain
Pt fingerstick 412
Pt desating to 85% on 8L nasal cannula.
Blood Glucose  491
Pt's blood sugar 446, repeat fs 430
Pt O2 sat 85% on NRB 15L   O2 drops to 70s% when pt self removes NRB mask
Pt c/o chest burning pain 8/10
Pt c/o generalized "burning" sensation/discomfort throughout body.
Pt noted with left sided abdominal pain  x 2 days
pt fs 58

## 2021-02-10 NOTE — PROGRESS NOTE ADULT - ATTENDING COMMENTS
daughter is updated --> wants full code   ABd exam with non specific tenderness and the plan was to at least get US of abd but pt had RRT for resp distress       Raquel Huber MD  Division of Hospital Medicine  Pager: [phone]

## 2021-02-10 NOTE — RAPID RESPONSE TEAM SUMMARY - NSSITUATIONBACKGROUNDRRT_GEN_ALL_CORE
70F with HTN, HLD, DMT2, sciatica, and history of stroke in 2015 resulting in slurred speech, difficulty walking and weakness on her right side, presenting [facility] (MR [id number][facility] for basilar artery stenosis in the setting of slurred speech and R-sided weakness. Transferred for possible endovascular intervention. Also admitted for sepsis 2/2 UTI and acute respiratory failure with hypoxia 2/2 COVID-19 infection.     RRT called for hypoxia, while patient on HFNC. Patient alert and awake. ABG obtained and placed patient on Bipap. 
70F with HTN, HLD, DMT2, sciatica, and history of stroke in 2015 resulting in slurred speech, difficulty walking and weakness on her right side, presenting as a transfer from [facility] (MR [id number]) for basilar artery stenosis in the setting of slurred speech and R-sided weakness. Transferred for possible endovascular intervention. Also admitted for sepsis 2/2 UTI and acute respiratory failure with hypoxia 2/2 COVID-19 infection. RRT called for increased work of breath on BiPAP; in acute respiratory distress. Intubated

## 2021-02-10 NOTE — PROGRESS NOTE ADULT - SUBJECTIVE AND OBJECTIVE BOX
Patient is a 70y old  Female who presents with a chief complaint of CVA (10 Feb 2021 11:00)      SUBJECTIVE / OVERNIGHT EVENTS:   Patient was seen in AM and was awake and responsive .  Patient has been on BIPAP since on 2/8 post RRT on 2/8 for hypoxemia on HFNC.  This afternoon patient became very tachypneic but saturation was >90%.  RRT was called and team is seeing patient ABG ws done with PaO2/FIO2 <140 ( 66) and PH of 7.48 .  Family was called and daughter was contacted who agrees for intubation       ADDITIONAL REVIEW OF SYSTEMS: Negative except for above    MEDICATIONS  (STANDING):  acetaminophen  IVPB .. 1000 milliGRAM(s) IV Intermittent once  ALBUTerol    90 MICROgram(s) HFA Inhaler 1 Puff(s) Inhalation every 6 hours  apixaban 5 milliGRAM(s) Oral two times a day  ascorbic acid 500 milliGRAM(s) Oral daily  atorvastatin 80 milliGRAM(s) Oral at bedtime  cholecalciferol 400 Unit(s) Oral daily  dextrose 40% Gel 15 Gram(s) Oral once  dextrose 5%. 1000 milliLiter(s) (50 mL/Hr) IV Continuous <Continuous>  dextrose 5%. 1000 milliLiter(s) (100 mL/Hr) IV Continuous <Continuous>  dextrose 5%. 1000 milliLiter(s) (100 mL/Hr) IV Continuous <Continuous>  dextrose 50% Injectable 25 Gram(s) IV Push once  dextrose 50% Injectable 12.5 Gram(s) IV Push once  dextrose 50% Injectable 25 Gram(s) IV Push once  glucagon  Injectable 1 milliGRAM(s) IntraMuscular once  insulin glargine Injectable (LANTUS) 24 Unit(s) SubCutaneous at bedtime  insulin lispro (ADMELOG) corrective regimen sliding scale   SubCutaneous every 6 hours  multivitamin 1 Tablet(s) Oral daily  pantoprazole    Tablet 40 milliGRAM(s) Oral before breakfast  polyethylene glycol 3350 17 Gram(s) Oral daily    MEDICATIONS  (PRN):  acetaminophen   Tablet .. 650 milliGRAM(s) Oral every 6 hours PRN Mild Pain (1 - 3)  bisacodyl Suppository 10 milliGRAM(s) Rectal daily PRN Constipation      CAPILLARY BLOOD GLUCOSE      POCT Blood Glucose.: 260 mg/dL (10 Feb 2021 15:23)  POCT Blood Glucose.: 254 mg/dL (10 Feb 2021 12:16)  POCT Blood Glucose.: 283 mg/dL (10 Feb 2021 05:32)  POCT Blood Glucose.: 339 mg/dL (09 Feb 2021 23:09)  POCT Blood Glucose.: 345 mg/dL (09 Feb 2021 18:12)    I&O's Summary    09 Feb 2021 07:01  -  10 Feb 2021 07:00  --------------------------------------------------------  IN: 0 mL / OUT: 1401 mL / NET: -1401 mL        PHYSICAL EXAM:  Vital Signs Last 24 Hrs  T(C): 37.2 (10 Feb 2021 11:32), Max: 37.2 (10 Feb 2021 11:32)  T(F): 98.9 (10 Feb 2021 11:32), Max: 98.9 (10 Feb 2021 11:32)  HR: 118 (10 Feb 2021 11:32) (105 - 118)  BP: 155/98 (10 Feb 2021 11:32) (135/87 - 155/98)  BP(mean): --  RR: 20 (10 Feb 2021 11:32) (20 - 20)  SpO2: 97% (10 Feb 2021 11:32) (91% - 97%)    PHYSICAL EXAM:        LABS:                        12.4   19.99 )-----------( 204      ( 10 Feb 2021 15:48 )             38.8     02-10    147<H>  |  109<H>  |  28<H>  ----------------------------<  316<H>  4.7   |  27  |  0.97    Ca    9.1      10 Feb 2021 06:09    TPro  7.0  /  Alb  2.9<L>  /  TBili  0.8  /  DBili  x   /  AST  43<H>  /  ALT  37  /  AlkPhos  108  02-10                RADIOLOGY & ADDITIONAL TESTS:    Imaging Personally Reviewed:    Electrocardiogram Personally Reviewed:    COORDINATION OF CARE:  Care Discussed with Consultants/Other Providers [Y/N]:  Prior or Outpatient Records Reviewed [Y/N]:     Patient is a 70y old  Female who presents with a chief complaint of CVA (10 Feb 2021 11:00)      SUBJECTIVE / OVERNIGHT EVENTS:   Patient was seen in AM and was awake and responsive .  Patient has been on BIPAP since on 2/8 post RRT on 2/8 for hypoxemia on HFNC.  This afternoon patient became very tachypneic but saturation was >90%.  RRT was called and team is seeing patient ABG ws done with PaO2/FIO2 <140 ( 66) and PH of 7.48 .  Family was called and daughter ( Empress) was contacted who agrees for intubation .      ADDITIONAL REVIEW OF SYSTEMS: Unable to obtain     MEDICATIONS  (STANDING):  acetaminophen  IVPB .. 1000 milliGRAM(s) IV Intermittent once  ALBUTerol    90 MICROgram(s) HFA Inhaler 1 Puff(s) Inhalation every 6 hours  apixaban 5 milliGRAM(s) Oral two times a day  ascorbic acid 500 milliGRAM(s) Oral daily  atorvastatin 80 milliGRAM(s) Oral at bedtime  cholecalciferol 400 Unit(s) Oral daily  dextrose 40% Gel 15 Gram(s) Oral once  dextrose 5%. 1000 milliLiter(s) (50 mL/Hr) IV Continuous <Continuous>  dextrose 5%. 1000 milliLiter(s) (100 mL/Hr) IV Continuous <Continuous>  dextrose 5%. 1000 milliLiter(s) (100 mL/Hr) IV Continuous <Continuous>  dextrose 50% Injectable 25 Gram(s) IV Push once  dextrose 50% Injectable 12.5 Gram(s) IV Push once  dextrose 50% Injectable 25 Gram(s) IV Push once  glucagon  Injectable 1 milliGRAM(s) IntraMuscular once  insulin glargine Injectable (LANTUS) 24 Unit(s) SubCutaneous at bedtime  insulin lispro (ADMELOG) corrective regimen sliding scale   SubCutaneous every 6 hours  multivitamin 1 Tablet(s) Oral daily  pantoprazole    Tablet 40 milliGRAM(s) Oral before breakfast  polyethylene glycol 3350 17 Gram(s) Oral daily    MEDICATIONS  (PRN):  acetaminophen   Tablet .. 650 milliGRAM(s) Oral every 6 hours PRN Mild Pain (1 - 3)  bisacodyl Suppository 10 milliGRAM(s) Rectal daily PRN Constipation      CAPILLARY BLOOD GLUCOSE      POCT Blood Glucose.: 260 mg/dL (10 Feb 2021 15:23)  POCT Blood Glucose.: 254 mg/dL (10 Feb 2021 12:16)  POCT Blood Glucose.: 283 mg/dL (10 Feb 2021 05:32)  POCT Blood Glucose.: 339 mg/dL (09 Feb 2021 23:09)  POCT Blood Glucose.: 345 mg/dL (09 Feb 2021 18:12)    I&O's Summary    09 Feb 2021 07:01  -  10 Feb 2021 07:00  --------------------------------------------------------  IN: 0 mL / OUT: 1401 mL / NET: -1401 mL        PHYSICAL EXAM:  Vital Signs Last 24 Hrs  T(C): 37.2 (10 Feb 2021 11:32), Max: 37.2 (10 Feb 2021 11:32)  T(F): 98.9 (10 Feb 2021 11:32), Max: 98.9 (10 Feb 2021 11:32)  HR: 118 (10 Feb 2021 11:32) (105 - 118)  BP: 155/98 (10 Feb 2021 11:32) (135/87 - 155/98)  BP(mean): --  RR: 20 (10 Feb 2021 11:32) (20 - 20)  SpO2: 97% (10 Feb 2021 11:32) (91% - 97%)    PHYSICAL EXAM:  CONSTITUTIONAL:BIPAP in place, obese  EYES:  conjunctiva and sclera clear  NECK :  large neck  RESPIRATORY: increased respiratory effort; lungs with air entry   CARDIOVASCULAR: Regular rate and rhythm, normal S1 and S2, no murmur/rub/gallop; No lower extremity edema  ABDOMEN: pos discomfort in all quadrants wit no rebound unchanged from yesterday , normoactive bowel sounds, no rebound/guarding; No hepatosplenomegaly  PSYCH: calm  NEUROLOGY:   Awake and responds to questions         LABS:                        12.4   19.99 )-----------( 204      ( 10 Feb 2021 15:48 )             38.8     02-10    147<H>  |  109<H>  |  28<H>  ----------------------------<  316<H>  4.7   |  27  |  0.97    Ca    9.1      10 Feb 2021 06:09    TPro  7.0  /  Alb  2.9<L>  /  TBili  0.8  /  DBili  x   /  AST  43<H>  /  ALT  37  /  AlkPhos  108  02-10                RADIOLOGY & ADDITIONAL TESTS:    Imaging Personally Reviewed:    Electrocardiogram Personally Reviewed:    COORDINATION OF CARE:  Care Discussed with Consultants/Other Providers [Y/N]:  Prior or Outpatient Records Reviewed [Y/N]:

## 2021-02-10 NOTE — PROGRESS NOTE ADULT - ASSESSMENT
70F with HTN, HLD, DMT2, sciatica, and history of stroke in 2015 resulting in slurred speech, difficulty walking and weakness on her right side, presenting as a transfer from [facility] (MR [id number]) for basilar artery stenosis in the setting of slurred speech and R-sided weakness. Transferred for possible endovascular intervention. Also admitted for sepsis 2/2 UTI and acute respiratory failure with hypoxia 2/2 COVID-19 infection.

## 2021-02-10 NOTE — PROGRESS NOTE ADULT - PROBLEM SELECTOR PLAN 1
- was on BIPAP post RRT on 2/8 and on 2/10 for hypoxic resp failure / family was contacted and agrees with intubation .  Pt is intubated and will be transferred to MICU for further care .    - In the setting of worsening COVID infection in pt with significant risks factors like obesity, DM , HTN and >64 YO  - CXR opacities worsening.   - F/up post intubation CXR   - with WBC increasing to 19.9K , might need ID eval for possible superinfection , ( no fever so far )

## 2021-02-10 NOTE — PROCEDURE NOTE - NSPROCDETAILS_GEN_ALL_CORE
location identified, draped/prepped, sterile technique used, needle inserted/introduced/positive blood return obtained via catheter/connected to a pressurized flush line/sutured in place/hemostasis with direct pressure, dressing applied/Seldinger technique/all materials/supplies accounted for at end of procedure
guidewire recovered/lumen(s) aspirated and flushed/sterile dressing applied/sterile technique, catheter placed/ultrasound guidance with use of sterile gel and probe cove

## 2021-02-10 NOTE — CHART NOTE - NSCHARTNOTEFT_GEN_A_CORE
CHIEF COMPLAINT: sob    HPI per H and P: "Patient is a 70-year-old female, past medical history significant for HTN, HLD, DM2, sciatica, and history of stroke in  resulting in slurred speech, difficulty walking and weakness on her right presenting as a transfer from [facility] (MR [id number]) for basilar a. stenosis. Pt presented to [facility] yesterday for 3 day history of slurred speech and R-sided weakness. At baseline, she ambulates with a cane and is AOx3. Patient was last seen normal Thursday morning. Yesterday, patient attempted to use the bathroom, but reported having difficulty getting up and moving. Patient was also noted to have increased urinary frequency.     At [facility], initial stroke work-up was done with CT head, CTA head and neck. Noncontrast head Ct was unremarkable. 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 Patient was transferred to [facility] for evaluation of possible eventual intervention on the severe basilar stenosis. While being evaluated at [facility] patient was noted to have a UTI as well as a fever. COVID screen returned positive. Of note, family has history of frequent blood clots in multiple generations. Daughter also reports that pt has had cough for the last 2 days.     In the ED, pt's vitals noted to be T 103.5 F  /80 RR 23-34 3L NC 95%. Treated with ceftriaxone 1 g IV, NS 1L bolus, plavix 300 mg PO, decadron 6 mg IV, and Tylenol."    PAST MEDICAL & SURGICAL HISTORY:  Cerebrovascular accident (CVA)  2015    Chronic low back pain, unspecified back pain laterality, unspecified whether sciatica present  sciatica    Diabetes mellitus    HLD (hyperlipidemia)    HTN (hypertension)    Sciatica, unspecified laterality    Type 2 diabetes mellitus with other specified complication, unspecified whether long term insulin use    No significant past surgical history    No significant past surgical history        FAMILY HISTORY:  Family history of blood clots        SOCIAL HISTORY:  Smoking: [ ] Never Smoked [ ] Former Smoker (__ packs x ___ years) [ ] Current Smoker  (__ packs x ___ years)  Substance Use: [ ] Never Used [ ] Used ____  EtOH Use:  Marital Status: [ ] Single [ ]  [ ]  [ ]   Sexual History:   Occupation:  Recent Travel:  Country of Birth:  Advance Directives:    Allergies    No Known Allergies    Intolerances        HOME MEDICATIONS:    REVIEW OF SYSTEMS:  Constitutional: [ ] fevers [ ] chills [ ] weight loss [ ] weight gain  HEENT: [ ] dry eyes [ ] eye irritation [ ] postnasal drip [ ] nasal congestion  CV: [ ] chest pain [ ] orthopnea [ ] palpitations [ ] murmur  Resp: [ ] cough [ ] shortness of breath [ ] dyspnea [ ] wheezing [ ] sputum [ ] hemoptysis  GI: [ ] nausea [ ] vomiting [ ] diarrhea [ ] constipation [ ] abd pain [ ] dysphagia   : [ ] dysuria [ ] nocturia [ ] hematuria [ ] increased urinary frequency  Musculoskeletal: [ ] back pain [ ] myalgias [ ] arthralgias [ ] fracture  Skin: [ ] rash [ ] itch  Neurological: [ ] headache [ ] dizziness [ ] syncope [ ] weakness [ ] numbness  Psychiatric: [ ] anxiety [ ] depression  Endocrine: [ ] diabetes [ ] thyroid problem  Hematologic/Lymphatic: [ ] anemia [ ] bleeding problem  Allergic/Immunologic: [ ] itchy eyes [ ] nasal discharge [ ] hives [ ] angioedema  [ ] All other systems negative  [ x] Unable to assess ROS because resp distress / bilevel support    OBJECTIVE:  ICU Vital Signs Last 24 Hrs  T(C): 37.2 (10 Feb 2021 11:32), Max: 37.2 (10 Feb 2021 11:32)  T(F): 98.9 (10 Feb 2021 11:32), Max: 98.9 (10 Feb 2021 11:32)  HR: 118 (10 Feb 2021 11:32) (105 - 118)  BP: 155/98 (10 Feb 2021 11:32) (135/87 - 155/98)  BP(mean): --  ABP: --  ABP(mean): --  RR: 20 (10 Feb 2021 11:32) (20 - 20)  SpO2: 97% (10 Feb 2021 11:32) (91% - 97%)         @ 07:01  -  02-10 @ 07:00  --------------------------------------------------------  IN: 0 mL / OUT: 1401 mL / NET: -1401 mL      CAPILLARY BLOOD GLUCOSE      POCT Blood Glucose.: 260 mg/dL (10 Feb 2021 15:23)    PHYSICAL EXAM:   GEN: Age appropriate, in moderate distress, tachypneic, unable to speak in complete sentences  HEENT: Conjunctiva and sclera normal  PULM: Lungs crackles bilaterally on bilevel  CV: Tachycardic, RRR, S1S2, no MRG  MSK: no stiffness or joint effusions  Abdominal: Soft, nontender to palpation, non-distended, +BS  Extremities: No edema or cyanosis  NEURO: AAOx3  Psych: unable to assess  Skin: No rashes, lesions    T(C): 37.2 (02-10-21 @ 11:32), Max: 37.2 (02-10-21 @ 11:32)  HR: 118 (02-10-21 @ 11:32) (105 - 118)  BP: 155/98 (02-10-21 @ 11:32) (135/87 - 155/98)  RR: 20 (02-10-21 @ 11:32) (20 - 20)  SpO2: 97% (02-10-21 @ 11:32) (91% - 97%)    LINES:     HOSPITAL MEDICATIONS:  MEDICATIONS  (STANDING):  acetaminophen  IVPB .. 1000 milliGRAM(s) IV Intermittent once  ALBUTerol    90 MICROgram(s) HFA Inhaler 1 Puff(s) Inhalation every 6 hours  apixaban 5 milliGRAM(s) Oral two times a day  ascorbic acid 500 milliGRAM(s) Oral daily  atorvastatin 80 milliGRAM(s) Oral at bedtime  cholecalciferol 400 Unit(s) Oral daily  dextrose 40% Gel 15 Gram(s) Oral once  dextrose 5%. 1000 milliLiter(s) (50 mL/Hr) IV Continuous <Continuous>  dextrose 5%. 1000 milliLiter(s) (100 mL/Hr) IV Continuous <Continuous>  dextrose 5%. 1000 milliLiter(s) (100 mL/Hr) IV Continuous <Continuous>  dextrose 50% Injectable 25 Gram(s) IV Push once  dextrose 50% Injectable 12.5 Gram(s) IV Push once  dextrose 50% Injectable 25 Gram(s) IV Push once  glucagon  Injectable 1 milliGRAM(s) IntraMuscular once  insulin glargine Injectable (LANTUS) 24 Unit(s) SubCutaneous at bedtime  insulin lispro (ADMELOG) corrective regimen sliding scale   SubCutaneous every 6 hours  multivitamin 1 Tablet(s) Oral daily  pantoprazole    Tablet 40 milliGRAM(s) Oral before breakfast  polyethylene glycol 3350 17 Gram(s) Oral daily    MEDICATIONS  (PRN):  acetaminophen   Tablet .. 650 milliGRAM(s) Oral every 6 hours PRN Mild Pain (1 - 3)  bisacodyl Suppository 10 milliGRAM(s) Rectal daily PRN Constipation      LABS:                        12.4   19.99 )-----------( 204      ( 10 Feb 2021 15:48 )             38.8     Hgb Trend: 12.4<--, 12.2<--, 12.7<--, 12.8<--, 15.2<--  0210    150<H>  |  112<H>  |  25<H>  ----------------------------<  292<H>  4.6   |  25  |  0.93    Ca    8.9      10 Feb 2021 15:48  Phos  2.7     02-10  Mg     2.8     10    TPro  6.7  /  Alb  3.1<L>  /  TBili  0.7  /  DBili  x   /  AST  63<H>  /  ALT  37  /  AlkPhos  132<H>  02-10    Creatinine Trend: 0.93<--, 0.97<--, 1.14<--, 1.05<--, 1.10<--, 0.93<--      Arterial Blood Gas:  02-10 @ 15:39  7.48/39/66/29/93/5.4  ABG lactate: --        MICROBIOLOGY:     RADIOLOGY:  [ ] Reviewed and interpreted by me    EKF with HTN, HLD, DMT2, sciatica, and history of stroke in  resulting in slurred speech, difficulty walking and weakness on her right side, presenting as a transfer from [facility] (MR [id number]) for basilar artery stenosis in the setting of slurred speech and R-sided weakness. Transferred for possible endovascular intervention. Also admitted for sepsis 2/2 UTI and acute respiratory failure with hypoxia 2/2 COVID-19 infection. Intubated and transferred to the MICU on 2/10 decompensating on bilevel support.     NEUROLOGIC:  Alteration of mental status present. Likely due to exogenous sedation, endogenous hypoxemia, endogenous sepsis. Per report, patient was alert and orient, conversant prior to developing severe respiratory distress today, therefore does not appear her structural neurologic condition was impacting her mental status. No evidence of encephalitis, meningitis, non convulsive status epilepticus    SKIN:  Lines:  Decubiti:    GI:  Diet:  Bowel regiment:    Urinary tract:  Alexander:       METABOLIC:  BMP showing   Liver functions tests showing   Endocrine:  02-10    150<H>  |  112<H>  |  25<H>  ----------------------------<  292<H>  4.6   |  25  |  0.93    Ca    8.9      10 Feb 2021 15:48  Phos  2.7     02-10  Mg     2.8     02-10    TPro  6.7  /  Alb  3.1<L>  /  TBili  0.7  /  DBili  x   /  AST  63<H>  /  ALT  37  /  AlkPhos  132<H>  02-10      VOLUME ASSESSMENT:  No peripheral edema  No evidence of disturbance of effective circulating volume    HEMATOLOGIC:  CBC results show  Coag panel shows  DVT prophylaxis with                         12.4   19.99 )-----------( 204      ( 10 Feb 2021 15:48 )             38.8         INFECTIOUS DISEASE:  Pt without strong objective or clinical evidence of infection. Will observe off antibiotics    HEMODYNAMICS:  Patient is on pressors due to ____ shock     CARDIOVASCULAR:    RESPIRATORY: CHIEF COMPLAINT: sob    HPI per H and P: "Patient is a 70-year-old female, past medical history significant for HTN, HLD, DM2, sciatica, and history of stroke in  resulting in slurred speech, difficulty walking and weakness on her right presenting as a transfer from [facility] (MR [id number]) for basilar a. stenosis. Pt presented to [facility] yesterday for 3 day history of slurred speech and R-sided weakness. At baseline, she ambulates with a cane and is AOx3. Patient was last seen normal Thursday morning. Yesterday, patient attempted to use the bathroom, but reported having difficulty getting up and moving. Patient was also noted to have increased urinary frequency.     At [facility], initial stroke work-up was done with CT head, CTA head and neck. Noncontrast head Ct was unremarkable. 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 Patient was transferred to [facility] for evaluation of possible eventual intervention on the severe basilar stenosis. While being evaluated at [facility] patient was noted to have a UTI as well as a fever. COVID screen returned positive. Of note, family has history of frequent blood clots in multiple generations. Daughter also reports that pt has had cough for the last 2 days.     In the ED, pt's vitals noted to be T 103.5 F  /80 RR 23-34 3L NC 95%. Treated with ceftriaxone 1 g IV, NS 1L bolus, plavix 300 mg PO, decadron 6 mg IV, and Tylenol."    PAST MEDICAL & SURGICAL HISTORY:  Cerebrovascular accident (CVA)  2015    Chronic low back pain, unspecified back pain laterality, unspecified whether sciatica present  sciatica    Diabetes mellitus    HLD (hyperlipidemia)    HTN (hypertension)    Sciatica, unspecified laterality    Type 2 diabetes mellitus with other specified complication, unspecified whether long term insulin use    No significant past surgical history    No significant past surgical history        FAMILY HISTORY:  Family history of blood clots        SOCIAL HISTORY:  Smoking: [ ] Never Smoked [ ] Former Smoker (__ packs x ___ years) [ ] Current Smoker  (__ packs x ___ years)  Substance Use: [ ] Never Used [ ] Used ____  EtOH Use:  Marital Status: [ ] Single [ ]  [ ]  [ ]   Sexual History:   Occupation:  Recent Travel:  Country of Birth:  Advance Directives:    Allergies    No Known Allergies    Intolerances        HOME MEDICATIONS:    REVIEW OF SYSTEMS:  Constitutional: [ ] fevers [ ] chills [ ] weight loss [ ] weight gain  HEENT: [ ] dry eyes [ ] eye irritation [ ] postnasal drip [ ] nasal congestion  CV: [ ] chest pain [ ] orthopnea [ ] palpitations [ ] murmur  Resp: [ ] cough [ ] shortness of breath [ ] dyspnea [ ] wheezing [ ] sputum [ ] hemoptysis  GI: [ ] nausea [ ] vomiting [ ] diarrhea [ ] constipation [ ] abd pain [ ] dysphagia   : [ ] dysuria [ ] nocturia [ ] hematuria [ ] increased urinary frequency  Musculoskeletal: [ ] back pain [ ] myalgias [ ] arthralgias [ ] fracture  Skin: [ ] rash [ ] itch  Neurological: [ ] headache [ ] dizziness [ ] syncope [ ] weakness [ ] numbness  Psychiatric: [ ] anxiety [ ] depression  Endocrine: [ ] diabetes [ ] thyroid problem  Hematologic/Lymphatic: [ ] anemia [ ] bleeding problem  Allergic/Immunologic: [ ] itchy eyes [ ] nasal discharge [ ] hives [ ] angioedema  [ ] All other systems negative  [ x] Unable to assess ROS because resp distress / bilevel support    OBJECTIVE:  ICU Vital Signs Last 24 Hrs  T(C): 37.2 (10 Feb 2021 11:32), Max: 37.2 (10 Feb 2021 11:32)  T(F): 98.9 (10 Feb 2021 11:32), Max: 98.9 (10 Feb 2021 11:32)  HR: 118 (10 Feb 2021 11:32) (105 - 118)  BP: 155/98 (10 Feb 2021 11:32) (135/87 - 155/98)  BP(mean): --  ABP: --  ABP(mean): --  RR: 20 (10 Feb 2021 11:32) (20 - 20)  SpO2: 97% (10 Feb 2021 11:32) (91% - 97%)         @ 07:01  -  02-10 @ 07:00  --------------------------------------------------------  IN: 0 mL / OUT: 1401 mL / NET: -1401 mL      CAPILLARY BLOOD GLUCOSE      POCT Blood Glucose.: 260 mg/dL (10 Feb 2021 15:23)    PHYSICAL EXAM:   GEN: Age appropriate, in moderate distress, tachypneic, unable to speak in complete sentences  HEENT: Conjunctiva and sclera normal  PULM: Lungs crackles bilaterally on bilevel  CV: Tachycardic, RRR, S1S2, no MRG  MSK: no stiffness or joint effusions  Abdominal: Soft, nontender to palpation, non-distended, +BS  Extremities: No edema or cyanosis  NEURO: AAOx3  Psych: unable to assess  Skin: No rashes, lesions    T(C): 37.2 (02-10-21 @ 11:32), Max: 37.2 (02-10-21 @ 11:32)  HR: 118 (02-10-21 @ 11:32) (105 - 118)  BP: 155/98 (02-10-21 @ 11:32) (135/87 - 155/98)  RR: 20 (02-10-21 @ 11:32) (20 - 20)  SpO2: 97% (02-10-21 @ 11:32) (91% - 97%)    LINES:     HOSPITAL MEDICATIONS:  MEDICATIONS  (STANDING):  acetaminophen  IVPB .. 1000 milliGRAM(s) IV Intermittent once  ALBUTerol    90 MICROgram(s) HFA Inhaler 1 Puff(s) Inhalation every 6 hours  apixaban 5 milliGRAM(s) Oral two times a day  ascorbic acid 500 milliGRAM(s) Oral daily  atorvastatin 80 milliGRAM(s) Oral at bedtime  cholecalciferol 400 Unit(s) Oral daily  dextrose 40% Gel 15 Gram(s) Oral once  dextrose 5%. 1000 milliLiter(s) (50 mL/Hr) IV Continuous <Continuous>  dextrose 5%. 1000 milliLiter(s) (100 mL/Hr) IV Continuous <Continuous>  dextrose 5%. 1000 milliLiter(s) (100 mL/Hr) IV Continuous <Continuous>  dextrose 50% Injectable 25 Gram(s) IV Push once  dextrose 50% Injectable 12.5 Gram(s) IV Push once  dextrose 50% Injectable 25 Gram(s) IV Push once  glucagon  Injectable 1 milliGRAM(s) IntraMuscular once  insulin glargine Injectable (LANTUS) 24 Unit(s) SubCutaneous at bedtime  insulin lispro (ADMELOG) corrective regimen sliding scale   SubCutaneous every 6 hours  multivitamin 1 Tablet(s) Oral daily  pantoprazole    Tablet 40 milliGRAM(s) Oral before breakfast  polyethylene glycol 3350 17 Gram(s) Oral daily    MEDICATIONS  (PRN):  acetaminophen   Tablet .. 650 milliGRAM(s) Oral every 6 hours PRN Mild Pain (1 - 3)  bisacodyl Suppository 10 milliGRAM(s) Rectal daily PRN Constipation      LABS:                        12.4   19.99 )-----------( 204      ( 10 Feb 2021 15:48 )             38.8     Hgb Trend: 12.4<--, 12.2<--, 12.7<--, 12.8<--, 15.2<--  0210    150<H>  |  112<H>  |  25<H>  ----------------------------<  292<H>  4.6   |  25  |  0.93    Ca    8.9      10 Feb 2021 15:48  Phos  2.7     02-10  Mg     2.8     10    TPro  6.7  /  Alb  3.1<L>  /  TBili  0.7  /  DBili  x   /  AST  63<H>  /  ALT  37  /  AlkPhos  132<H>  02-10    Creatinine Trend: 0.93<--, 0.97<--, 1.14<--, 1.05<--, 1.10<--, 0.93<--      Arterial Blood Gas:  02-10 @ 15:39  7.48/39/66/29/93/5.4  ABG lactate: --        MICROBIOLOGY:     RADIOLOGY:  [ ] Reviewed and interpreted by me    EKF with HTN, HLD, DMT2, sciatica, and history of stroke in  resulting in slurred speech, difficulty walking and weakness on her right side, presenting as a transfer from [facility] (MR [id number]) for basilar artery stenosis in the setting of slurred speech and R-sided weakness. Transferred for possible endovascular intervention. Also admitted for sepsis 2/2 UTI and acute respiratory failure with hypoxia 2/2 COVID-19 infection. Intubated and transferred to the MICU on 2/10 decompensating on bilevel support.     NEUROLOGIC:  Alteration of mental status present. Likely due to exogenous sedation, endogenous hypoxemia, endogenous sepsis. Per report, patient was alert and orient, conversant prior to developing severe respiratory distress today, therefore does not appear her structural neurologic condition was impacting her mental status. No evidence of encephalitis, meningitis, non convulsive status epilepticus    Hx of CVA 2016 c/b slurred speech and R sided weakness. At [facility] CTA demonstrated severe BA focal stenosis and was found to have MRI with L parietal infarct, MRA with severe BA stenosis and fetal PCAs and supraclinoid ICA blister aneurysm, also multifocal stenosis in likely large artery athero. LA neg,  Also noted to have parietal infarct is anterior circulation not related to BA stenosis  Patient transferred to [facility] with multifocal stenosis including severe BA stenosis, blister aneurysm for possible endovascular intervention now on hold given clinical deterioration. If improves will likely need diagnostic angio w interventional rads Dr Hernandez  On apixaban 5mg BID x 1 month followed by ASA 81 and plavix 75mg daily.      SKIN:  Lines: Will place central line and A line  Decubiti: None    GI:  Diet: NPO will place OGT for tube feeds  Bowel regiment: Will start senna and miralax    Urinary tract:  Alexander:       METABOLIC:  BMP showing   Liver functions tests showing   Endocrine:  02-10    150<H>  |  112<H>  |  25<H>  ----------------------------<  292<H>  4.6   |  25  |  0.93    Ca    8.9      10 Feb 2021 15:48  Phos  2.7     -10  Mg     2.8     02-10    TPro  6.7  /  Alb  3.1<L>  /  TBili  0.7  /  DBili  x   /  AST  63<H>  /  ALT  37  /  AlkPhos  132<H>  02-10      VOLUME ASSESSMENT:  No peripheral edema  No evidence of disturbance of effective circulating volume    HEMATOLOGIC:  CBC results show  Coag panel shows  DVT prophylaxis with                         12.4   19.99 )-----------( 204      ( 10 Feb 2021 15:48 )             38.8         INFECTIOUS DISEASE:  Pt without strong objective or clinical evidence of infection. Will observe off antibiotics    HEMODYNAMICS:  Patient is on pressors due to ____ shock     CARDIOVASCULAR:    RESPIRATORY: CHIEF COMPLAINT: sob    HPI per H and P: "Patient is a 70-year-old female, past medical history significant for HTN, HLD, DM2, sciatica, and history of stroke in  resulting in slurred speech, difficulty walking and weakness on her right presenting as a transfer from [facility] (MR [id number]) for basilar a. stenosis. Pt presented to [facility] yesterday for 3 day history of slurred speech and R-sided weakness. At baseline, she ambulates with a cane and is AOx3. Patient was last seen normal Thursday morning. Yesterday, patient attempted to use the bathroom, but reported having difficulty getting up and moving. Patient was also noted to have increased urinary frequency.     At [facility], initial stroke work-up was done with CT head, CTA head and neck. Noncontrast head Ct was unremarkable. 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 Patient was transferred to [facility] for evaluation of possible eventual intervention on the severe basilar stenosis. While being evaluated at [facility] patient was noted to have a UTI as well as a fever. COVID screen returned positive. Of note, family has history of frequent blood clots in multiple generations. Daughter also reports that pt has had cough for the last 2 days.     In the ED, pt's vitals noted to be T 103.5 F  /80 RR 23-34 3L NC 95%. Treated with ceftriaxone 1 g IV, NS 1L bolus, plavix 300 mg PO, decadron 6 mg IV, and Tylenol."    PAST MEDICAL & SURGICAL HISTORY:  Cerebrovascular accident (CVA)  2015    Chronic low back pain, unspecified back pain laterality, unspecified whether sciatica present  sciatica    Diabetes mellitus    HLD (hyperlipidemia)    HTN (hypertension)    Sciatica, unspecified laterality    Type 2 diabetes mellitus with other specified complication, unspecified whether long term insulin use    No significant past surgical history    No significant past surgical history        FAMILY HISTORY:  Family history of blood clots        SOCIAL HISTORY:  Smoking: [ ] Never Smoked [ ] Former Smoker (__ packs x ___ years) [ ] Current Smoker  (__ packs x ___ years)  Substance Use: [ ] Never Used [ ] Used ____  EtOH Use:  Marital Status: [ ] Single [ ]  [ ]  [ ]   Sexual History:   Occupation:  Recent Travel:  Country of Birth:  Advance Directives:    Allergies    No Known Allergies    Intolerances        HOME MEDICATIONS:    REVIEW OF SYSTEMS:  Constitutional: [ ] fevers [ ] chills [ ] weight loss [ ] weight gain  HEENT: [ ] dry eyes [ ] eye irritation [ ] postnasal drip [ ] nasal congestion  CV: [ ] chest pain [ ] orthopnea [ ] palpitations [ ] murmur  Resp: [ ] cough [ ] shortness of breath [ ] dyspnea [ ] wheezing [ ] sputum [ ] hemoptysis  GI: [ ] nausea [ ] vomiting [ ] diarrhea [ ] constipation [ ] abd pain [ ] dysphagia   : [ ] dysuria [ ] nocturia [ ] hematuria [ ] increased urinary frequency  Musculoskeletal: [ ] back pain [ ] myalgias [ ] arthralgias [ ] fracture  Skin: [ ] rash [ ] itch  Neurological: [ ] headache [ ] dizziness [ ] syncope [ ] weakness [ ] numbness  Psychiatric: [ ] anxiety [ ] depression  Endocrine: [ ] diabetes [ ] thyroid problem  Hematologic/Lymphatic: [ ] anemia [ ] bleeding problem  Allergic/Immunologic: [ ] itchy eyes [ ] nasal discharge [ ] hives [ ] angioedema  [ ] All other systems negative  [ x] Unable to assess ROS because resp distress / bilevel support    OBJECTIVE:  ICU Vital Signs Last 24 Hrs  T(C): 37.2 (10 Feb 2021 11:32), Max: 37.2 (10 Feb 2021 11:32)  T(F): 98.9 (10 Feb 2021 11:32), Max: 98.9 (10 Feb 2021 11:32)  HR: 118 (10 Feb 2021 11:32) (105 - 118)  BP: 155/98 (10 Feb 2021 11:32) (135/87 - 155/98)  BP(mean): --  ABP: --  ABP(mean): --  RR: 20 (10 Feb 2021 11:32) (20 - 20)  SpO2: 97% (10 Feb 2021 11:32) (91% - 97%)         @ 07:01  -  02-10 @ 07:00  --------------------------------------------------------  IN: 0 mL / OUT: 1401 mL / NET: -1401 mL      CAPILLARY BLOOD GLUCOSE      POCT Blood Glucose.: 260 mg/dL (10 Feb 2021 15:23)    PHYSICAL EXAM:   GEN: Age appropriate, in moderate distress, tachypneic, unable to speak in complete sentences  HEENT: Conjunctiva and sclera normal  PULM: Lungs crackles bilaterally on bilevel  CV: Tachycardic, RRR, S1S2, no MRG  MSK: no stiffness or joint effusions  Abdominal: Soft, nontender to palpation, non-distended, +BS  Extremities: No edema or cyanosis  NEURO: AAOx3  Psych: unable to assess  Skin: No rashes, lesions    T(C): 37.2 (02-10-21 @ 11:32), Max: 37.2 (02-10-21 @ 11:32)  HR: 118 (02-10-21 @ 11:32) (105 - 118)  BP: 155/98 (02-10-21 @ 11:32) (135/87 - 155/98)  RR: 20 (02-10-21 @ 11:32) (20 - 20)  SpO2: 97% (02-10-21 @ 11:32) (91% - 97%)    LINES:     HOSPITAL MEDICATIONS:  MEDICATIONS  (STANDING):  acetaminophen  IVPB .. 1000 milliGRAM(s) IV Intermittent once  ALBUTerol    90 MICROgram(s) HFA Inhaler 1 Puff(s) Inhalation every 6 hours  apixaban 5 milliGRAM(s) Oral two times a day  ascorbic acid 500 milliGRAM(s) Oral daily  atorvastatin 80 milliGRAM(s) Oral at bedtime  cholecalciferol 400 Unit(s) Oral daily  dextrose 40% Gel 15 Gram(s) Oral once  dextrose 5%. 1000 milliLiter(s) (50 mL/Hr) IV Continuous <Continuous>  dextrose 5%. 1000 milliLiter(s) (100 mL/Hr) IV Continuous <Continuous>  dextrose 5%. 1000 milliLiter(s) (100 mL/Hr) IV Continuous <Continuous>  dextrose 50% Injectable 25 Gram(s) IV Push once  dextrose 50% Injectable 12.5 Gram(s) IV Push once  dextrose 50% Injectable 25 Gram(s) IV Push once  glucagon  Injectable 1 milliGRAM(s) IntraMuscular once  insulin glargine Injectable (LANTUS) 24 Unit(s) SubCutaneous at bedtime  insulin lispro (ADMELOG) corrective regimen sliding scale   SubCutaneous every 6 hours  multivitamin 1 Tablet(s) Oral daily  pantoprazole    Tablet 40 milliGRAM(s) Oral before breakfast  polyethylene glycol 3350 17 Gram(s) Oral daily    MEDICATIONS  (PRN):  acetaminophen   Tablet .. 650 milliGRAM(s) Oral every 6 hours PRN Mild Pain (1 - 3)  bisacodyl Suppository 10 milliGRAM(s) Rectal daily PRN Constipation      LABS:                        12.4   19.99 )-----------( 204      ( 10 Feb 2021 15:48 )             38.8     Hgb Trend: 12.4<--, 12.2<--, 12.7<--, 12.8<--, 15.2<--  0210    150<H>  |  112<H>  |  25<H>  ----------------------------<  292<H>  4.6   |  25  |  0.93    Ca    8.9      10 Feb 2021 15:48  Phos  2.7     02-10  Mg     2.8     10    TPro  6.7  /  Alb  3.1<L>  /  TBili  0.7  /  DBili  x   /  AST  63<H>  /  ALT  37  /  AlkPhos  132<H>  02-10    Creatinine Trend: 0.93<--, 0.97<--, 1.14<--, 1.05<--, 1.10<--, 0.93<--      Arterial Blood Gas:  02-10 @ 15:39  7.48/39/66/29/93/5.4  ABG lactate: --        MICROBIOLOGY:     RADIOLOGY:  [ ] Reviewed and interpreted by me    EKF with HTN, HLD, DMT2, sciatica, and history of stroke in  resulting in slurred speech, difficulty walking and weakness on her right side, presenting as a transfer from [facility] (MR [id number]) for basilar artery stenosis in the setting of slurred speech and R-sided weakness. Transferred for possible endovascular intervention. Also admitted for sepsis 2/2 UTI and acute respiratory failure with hypoxia 2/2 COVID-19 infection. Intubated and transferred to the MICU on 2/10 decompensating on bilevel support.     NEUROLOGIC:  Alteration of mental status present. Likely due to exogenous sedation, endogenous hypoxemia, endogenous sepsis. Per report, patient was alert and orient, conversant prior to developing severe respiratory distress today, therefore does not appear her structural neurologic condition was impacting her mental status. No evidence of encephalitis, meningitis, non convulsive status epilepticus    Hx of CVA 2016 c/b slurred speech and R sided weakness. At [facility] CTA demonstrated severe BA focal stenosis and was found to have MRI with L parietal infarct, MRA with severe BA stenosis and fetal PCAs and supraclinoid ICA blister aneurysm, also multifocal stenosis in likely large artery athero. LA neg,  Also noted to have parietal infarct is anterior circulation not related to BA stenosis  Patient transferred to [facility] with multifocal stenosis including severe BA stenosis, blister aneurysm for possible endovascular intervention now on hold given clinical deterioration. If improves will likely need diagnostic angio w interventional rads Dr Hernandez  On apixaban 5mg BID x 1 month followed by ASA 81 and plavix 75mg daily.      SKIN:  Lines: Will place central line and A line  Decubiti: None    GI:  Diet: NPO will place OGT for tube feeds  Bowel regiment: Will start senna and miralax    Urinary tract:  Alexander:       METABOLIC:  BMP showing   Liver functions tests showing   Endocrine:  02-10    150<H>  |  112<H>  |  25<H>  ----------------------------<  292<H>  4.6   |  25  |  0.93    Ca    8.9      10 Feb 2021 15:48  Phos  2.7     -10  Mg     2.8     -10    TPro  6.7  /  Alb  3.1<L>  /  TBili  0.7  /  DBili  x   /  AST  63<H>  /  ALT  37  /  AlkPhos  132<H>  02-10      VOLUME ASSESSMENT:  No peripheral edema  No evidence of disturbance of effective circulating volume    HEMATOLOGIC:  CBC results shows leukocytosis  Coag panel pending  DVT prophylaxis with apixaban                        12.4   19.99 )-----------( 204      ( 10 Feb 2021 15:48 )             38.8         INFECTIOUS DISEASE:  COVID ARDS  S/p Dex and Remdesivr  No evidence of bacterial infection  Will resend UA, sputum culture and blood cultures  Observe off antibiotics    HEMODYNAMICS:  Patient is on pressors vasoplegic shock from medications    CARDIOVASCULAR:    RESPIRATORY:  COVID PNA failed bilevel  Intubated 2/10  Follow up post intubation ABG  Paralyze and prone as indicated      GOC   Full code per extensive family discussion on 2/10.

## 2021-02-10 NOTE — PROGRESS NOTE ADULT - SUBJECTIVE AND OBJECTIVE BOX
Neurology Progress Note    S: Patient seen and examined in covid unit on airborn and contact isolation. on BIPAP.      Medication:  MEDICATIONS  (STANDING):  ALBUTerol    90 MICROgram(s) HFA Inhaler 1 Puff(s) Inhalation every 6 hours  apixaban 5 milliGRAM(s) Oral two times a day  ascorbic acid 500 milliGRAM(s) Oral daily  atorvastatin 80 milliGRAM(s) Oral at bedtime  cholecalciferol 400 Unit(s) Oral daily  dextrose 40% Gel 15 Gram(s) Oral once  dextrose 5%. 1000 milliLiter(s) (50 mL/Hr) IV Continuous <Continuous>  dextrose 5%. 1000 milliLiter(s) (100 mL/Hr) IV Continuous <Continuous>  dextrose 5%. 1000 milliLiter(s) (100 mL/Hr) IV Continuous <Continuous>  dextrose 50% Injectable 25 Gram(s) IV Push once  dextrose 50% Injectable 12.5 Gram(s) IV Push once  dextrose 50% Injectable 25 Gram(s) IV Push once  glucagon  Injectable 1 milliGRAM(s) IntraMuscular once  insulin glargine Injectable (LANTUS) 24 Unit(s) SubCutaneous at bedtime  insulin lispro (ADMELOG) corrective regimen sliding scale   SubCutaneous every 6 hours  multivitamin 1 Tablet(s) Oral daily  pantoprazole    Tablet 40 milliGRAM(s) Oral before breakfast  polyethylene glycol 3350 17 Gram(s) Oral daily    MEDICATIONS  (PRN):  acetaminophen   Tablet .. 650 milliGRAM(s) Oral every 6 hours PRN Mild Pain (1 - 3)  bisacodyl Suppository 10 milliGRAM(s) Rectal daily PRN Constipation        Vitals:  ICU Vital Signs Last 24 Hrs  T(C): 37.2 (10 Feb 2021 11:32), Max: 37.6 (09 Feb 2021 15:22)  T(F): 98.9 (10 Feb 2021 11:32), Max: 99.7 (09 Feb 2021 15:22)  HR: 118 (10 Feb 2021 11:32) (105 - 118)  BP: 155/98 (10 Feb 2021 11:32) (126/86 - 155/98)  BP(mean): --  ABP: --  ABP(mean): --  RR: 20 (10 Feb 2021 11:32) (20 - 22)  SpO2: 97% (10 Feb 2021 11:32) (91% - 97%)        General Exam:   General Appearance: Appropriately dressed and in no acute distress       Head: Normocephalic, atraumatic and no dysmorphic features  Ear, Nose, and Throat: Moist mucous membranes   CVS: S1S2+  Resp: No SOB, no wheeze or rhonchi on BIPAP  Abd: soft NTND  Extremities: No edema, no cyanosis  Skin: No bruises, no rashes     Neurological Exam:  Mental Status: Awake, alert and oriented x 2-3.  Able to follow simple and complex verbal commands. Able to name and repeat. fluent speech. No obvious aphasia or dysarthria noted.   Cranial Nerves: PERRL, EOMI, VFFC, sensation V1-V3 intact,  mild R facial asymmetry , equal elevation of palate, scm/trap 5/5, tongue is midline on protrusion. no obvious papilledema on fundoscopic exam. Hearing is grossly intact.   Motor: RUE and RLE 4/5, LUE/LLE 5/5   Sensation: Intact to light touch and pinprick throughout.   Reflexes: 1+ throughout at biceps, brachioradialis, triceps, patellars and ankles bilaterally and equal. No clonus. R toe and L toe were both downgoing.  Coordination: No dysmetria on FNF   Gait: deferred     I personally reviewed the below data/images/labs:  CBC Full  -  ( 09 Feb 2021 10:09 )  WBC Count : 16.39 K/uL  RBC Count : 4.31 M/uL  Hemoglobin : 12.2 g/dL  Hematocrit : 38.1 %  Platelet Count - Automated : 314 K/uL  Mean Cell Volume : 88.4 fl  Mean Cell Hemoglobin : 28.3 pg  Mean Cell Hemoglobin Concentration : 32.0 gm/dL  Auto Neutrophil # : 14.78 K/uL  Auto Lymphocyte # : 1.02 K/uL  Auto Monocyte # : 0.59 K/uL  Auto Eosinophil # : 0.00 K/uL  Auto Basophil # : 0.00 K/uL  Auto Neutrophil % : 87.5 %  Auto Lymphocyte % : 6.2 %  Auto Monocyte % : 3.6 %  Auto Eosinophil % : 0.0 %  Auto Basophil % : 0.0 %    02-10    147<H>  |  109<H>  |  28<H>  ----------------------------<  316<H>  4.7   |  27  |  0.97    Ca    9.1      10 Feb 2021 06:09    TPro  7.0  /  Alb  2.9<L>  /  TBili  0.8  /  DBili  x   /  AST  43<H>  /  ALT  37  /  AlkPhos  108  02-10      Radiology:  -CT Head - unremarkable  -CTA head and neck 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    < from: MR Angio Neck w/wo IV Cont (02.02.21 @ 00:16) >    EXAM:  MR ANGIO BRAIN                          EXAM:  MR ANGIO NECK WAW IC                          EXAM:  MR BRAIN                            PROCEDURE DATE:  02/02/2021            INTERPRETATION:  CLINICAL INDICATION: COVID Positive, basilar stenosis, 70-year-old female with stroke, right-sided weakness and speech for 3 days, altered mental status,    Technique: Contrast brain MRI, non-contrast brain MRA and contrast neck MRA was performed.    Through the brain, sagittal and axial T1, axial T2, FLAIR, diffusion weighted images and an ADC map were obtained. Axial T1 post-contrast images were obtained and reconstructed in sagittal and coronal planes. 10 cc of intravenous gadolinium Gadavist gadolinium was administered and 0 discarded,  for contrast MRI brain and contrast enhanced MR angiography of the extracranial circulation.    MR angiography of intracranial and extracranial circulation was performed with time of flight imaging technique. Maximal intensity projection images were reviewed in multiple planes.    COMPARISON: Head CT, CT perfusion, CTA brain and neck, 1/30/2021    FINDINGS:  Brain MRI:  Foci restricted diffusion with hyperintense DWI, T2, FLAIR signal, left parietal lobe just above the occipital lobe (4:20, 400:20) concerning for new ischemia without hemorrhagic transformation. There is moderate enlargement of the ventricles and sulci greater than expected for age consistent with volume loss. There are foci of encephalomalacia including not limited to the corpus callosum genu and body (3:13). There is encephalomalacia with gliosis along the central and left paramedian goran, correlate with any prior sequela of remote ischemia (7:10, 6:9) with Wallerian degeneration  left cerebral peduncle, goran, and medullary pyramid (7:11-7). There is nonspecific foci of T2/FLAIR signal hyperintensity in the hemispheric white matter, likely microvascular disease, infectious and/or inflammatory etiologies in patient with COVID positivity not excluded with faint FLAIR hyperintensity noted along the bilateral olfactory gyri. There is a partially empty sella. No intraparenchymal hematoma, mass, extra-axial collection or midline shift. Basal cisterns remain patent.    Intact calvarium. Orbits and mastoids are unremarkable. Left maxillary mucosal thickening, with retention cyst and polyps, with mild ethmoid, frontal and sphenoid mucosal thickening.      Brain MRA:  Motion limited MRA,, calcified noncalcified plaque, causes multifocal stenosis bilateral cavernous and supraclinoid ICAs with 3.9 x 2.6 x 2.3 mm AP, TR, CC blister aneurysm extending inferiorly  right internal common carotid artery unchanged. There is flow-related signal  bilateral ICA cavernous and supraclinoid segments with  multifocal stenosis and poststenotic dilatation. Stenosis the right proximal right A1 and M1 segments (6:77). Redemonstration, stenosis and narrowing along the distal right M1, decreased right MCA branches, patent left A1, proximal A2 segments with multifocal stenosis anterior cerebral arteries. Stenosis distal left M1, multifocal stenosis distal left MCA branches, assessment limited by motion artifact.    Fetal bilateral PCAs, dominant anterior circulation, right vertebral artery crosses leftward, vertebral arteries are patent to vertebrobasilar junction, severe stenosis proximal basilar artery  above the vertebrobasilar junction with reconstitution distal basilar via P1 segments and fetal PCA's, multifocal stenosis PCA branch vessels.    Neck MRA:  The aortic arch and origins of the great vessels origins are patent..    Common and Internal Carotids: Tortuous bilateral common carotid arteries with retropharyngeal extension consistent with kissing carotids (5:35) no hemodynamically significant stenosis by NASCET criteria along origin of either right or left internal or external carotid artery.    Vertebral arteries:  Tortuous course and caliber of the bilateral vertebral arteries correlate with hypertension vessels are patent to intracranial V4 segments.    IMPRESSION:    Foci  restricted diffusion, DWI hyperintensity left inferior parietal lobe, no hemorrhagic transformation. Multifocal  T2 and FLAIR hyperintensity white matter may reflect microvascular disease,  additional etiologies not excluded with  COVIDpositivity.    Volume loss, multifocal encephalomalacia  corpus callosum body and splenium, Wallerian degeneration, left paramedian goran, medullary pyramid, no new hemorrhage or midline shift.    Severe flow limiting stenosis of the proximal basilar artery above the vertebral basilar junction with fetal origin PCAs, reconstitution of the distal basilar artery via the bilateral P1 segments.    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    Tortuous extracranial vessels likely from hypertension without hemodynamic significant stenosis at ICA origins by NASCET criteria. Tortuous vertebral arteries patent to V4 segments.    Findings discussed with Dr. Butler of neurology, at immediate time of review, 2/2/2021 at 9:10 AM.                JUDY NAILS MD; Attending Radiologist  This document has been electronically signed. Feb 2 2021  9:14AM    < end of copied text >

## 2021-02-10 NOTE — PROVIDER CONTACT NOTE (OTHER) - REASON
pt c/o left sided abdominal pain
Pt c/o chest burning
Pt's blood sugar 446
pt hypoxic in the 80s
Burning discomfort
Hypoxia
pt fs 58
Patient desat on nasal cannula
Pt blood sugar 412
RRT called for distress and tachypnea
left side abdominal pain
pt c/o of indigestion
Blood Glucose  491

## 2021-02-10 NOTE — PROGRESS NOTE ADULT - ASSESSMENT
71 yo F w/h/o uncontrolled DM2 (HbA1c 10.1). DM c/b neuropathy, CVA'15. Also HTN, HLD, chronic low back pain and sciatica. Transferred from [facility] for basilar artery stenosis, while in hospital found to have UTI and COVID-19. On Dexa for COVID  tx. Endocrine Team consulted for inpatient T2DM management. Pt on BIPAP and NPO. Glycemic control above goal with BG 200s to 300s while NPO due to steroid effect. No hypoglycemia. Noted pt now in ICU intubated/sedated. Also on antibiotic for bacteremia > given in D5 infusion. Also noted Lantus discontinued > on Admelog scale only and off  steroids. Pt needs basal insulin since she is insulin resistant and also still with hyperglycemia due to lingering effect of steroid that can last up to 72 hours BG goal 100s to 180s off steroids.       Spent 25 minutes assessing pt/labs/meds and discussing plan of care with primary team. Moderate complexity encounter on pt with uncontrolled T2DM with complications and comorbidities now with acute infection and steroids worsening glycemic control. Adjusting insulin >on BIPAP now. NPO.

## 2021-02-11 NOTE — PROGRESS NOTE ADULT - SUBJECTIVE AND OBJECTIVE BOX
Diabetes Follow up note:    Chief complaint: T2DM w/steroid induced hyperglycemia    Interval Hx: pt intubated yesterday. Now w/high pressor requirements. BG values in 300s since yesterday w/acidosis. Started on NPH by team this AM. Pt seen at bedside. Intubated/sedated.     Review of Systems:  sedated. Unable to provide ROS.     MEDS:  atorvastatin 80 milliGRAM(s) Oral at bedtime  insulin lispro (ADMELOG) corrective regimen sliding scale   SubCutaneous every 4 hours  insulin NPH human recombinant 6 Unit(s) SubCutaneous every 6 hours  vasopressin Infusion 0.04 Unit(s)/Min IV Continuous <Continuous>    piperacillin/tazobactam IVPB.. 3.375 Gram(s) IV Intermittent every 8 hours    Allergies    No Known Allergies      PE:  General: Female lying in bed. Intubated  Vital Signs Last 24 Hrs  T(C): 38.3 (11 Feb 2021 08:00), Max: 38.9 (10 Feb 2021 17:00)  T(F): 100.9 (11 Feb 2021 08:00), Max: 102 (10 Feb 2021 17:00)  HR: 84 (11 Feb 2021 11:45) (84 - 146)  BP: --  BP(mean): --  RR: 25 (11 Feb 2021 10:00) (25 - 29)  SpO2: 94% (11 Feb 2021 11:45) (82% - 100%)  Abd: Soft, NT,ND, Obese.   Extremities: Warm  Neuro Sedated    LABS:  POCT Blood Glucose.: 316 mg/dL (02-11-21 @ 13:05)  POCT Blood Glucose.: 350 mg/dL (02-11-21 @ 10:01)  POCT Blood Glucose.: 362 mg/dL (02-11-21 @ 01:35)  POCT Blood Glucose.: 260 mg/dL (02-10-21 @ 15:23)  POCT Blood Glucose.: 254 mg/dL (02-10-21 @ 12:16)  POCT Blood Glucose.: 283 mg/dL (02-10-21 @ 05:32)  POCT Blood Glucose.: 339 mg/dL (02-09-21 @ 23:09)  POCT Blood Glucose.: 345 mg/dL (02-09-21 @ 18:12)  POCT Blood Glucose.: 322 mg/dL (02-09-21 @ 12:15)  POCT Blood Glucose.: 180 mg/dL (02-09-21 @ 05:32)  POCT Blood Glucose.: 95 mg/dL (02-09-21 @ 01:51)  POCT Blood Glucose.: 126 mg/dL (02-08-21 @ 22:45)  POCT Blood Glucose.: 113 mg/dL (02-08-21 @ 22:44)  POCT Blood Glucose.: 130 mg/dL (02-08-21 @ 22:25)  POCT Blood Glucose.: 87 mg/dL (02-08-21 @ 22:07)  POCT Blood Glucose.: 55 mg/dL (02-08-21 @ 21:56)  POCT Blood Glucose.: 58 mg/dL (02-08-21 @ 21:41)  POCT Blood Glucose.: 59 mg/dL (02-08-21 @ 21:41)  POCT Blood Glucose.: 165 mg/dL (02-08-21 @ 16:34)                            9.7    16.54 )-----------( 122      ( 11 Feb 2021 13:21 )             31.8       02-11    146<H>  |  111<H>  |  36<H>  ----------------------------<  378<H>  5.0   |  23  |  1.29    Ca    7.8<L>      11 Feb 2021 13:21  Phos  4.7     02-11  Mg     2.8     02-11    TPro  5.5<L>  /  Alb  2.5<L>  /  TBili  1.8<H>  /  DBili  x   /  AST  337<H>  /  ALT  256<H>  /  AlkPhos  122<H>  02-11      A1C with Estimated Average Glucose Result: 10.1 % (01-31-21 @ 09:13)          Contact number: ashleigh [phone] or [phone]

## 2021-02-11 NOTE — PROGRESS NOTE ADULT - PROBLEM SELECTOR PLAN 3
-on pressors at this time. Management per MICU team    discussed w/MICU PA  pager: [phone]   office:  [phone] (M-F 9a-5pm)               [phone] (nights/weekends)    -I spent a total time of 25 mins with the patient at bedside/on unit of which more than 50% of time was spent on counseling/coordination of care.

## 2021-02-11 NOTE — CHART NOTE - NSCHARTNOTEFT_GEN_A_CORE
Nutrition Follow Up Note   Patient seen for: nutrition follow up on COVID ICU     Source: medical record, communication with team. Unable to speak to pt due to current airborne isolation contact precautions related to COVID-19. Pt remains intubated.     Chart reviewed, events noted. "70F with HTN, HLD, DMT2, sciatica, and history of stroke in 2015 resulting in slurred speech, difficulty walking and weakness on her right side, presenting as a transfer from [facility] (MR [id number]) for basilar artery stenosis in the setting of slurred speech and R-sided weakness. Transferred for possible endovascular intervention. Also admitted for sepsis 2/2 UTI and acute respiratory failure with hypoxia 2/2 COVID-19 infection." s/p RRT on 2/8 for hypoxia, placed on Bipap, made NPO. Pt intubated 2/10 s/p RRT for AMS and increased oxygenation requirements while on BiPAP and transferred to 5ICU. Continues on zosyn      Diet Order: Diet, NPO with Tube Feed:   Tube Feeding Modality: Orogastric  Glucerna 1.2 Wu (GLUCERNARTH)  Continuous  Starting Tube Feed Rate {mL per Hour}: 10  Increase Tube Feed Rate by (mL): 10     Every 8 hours  Until Goal Tube Feed Rate (mL per Hour): 25  Tube Feed Duration (in Hours): 24  Tube Feed Start Time: 17:00 (02-10-21 @ 18:29)    CURRENT EN ORDER PROVIDES: 720kcals, 36g protein, 483mL free H2O  - No EN provisions noted at this time in I&O's; per RN Plan of Care flowsheet summary pt was receiving Glucerna 1.2 @ 20mL/hr this AM - will follow-up with team     Nutrition Events:   - 2/11: Phos (4.7) <H>; Na (146) <H> -  free water increased 200mL q4hrs   - Propofol ordered - Pt has received 429 kcals via lipids in the last 24hrs; if infusion continues at current rate (30 mL/hr), will provide ~790 kcals/day   - Currently supine with plans to prone  - Pressor support: levo & vaso  - Sedated on propofol, fentanyl & versed; Restarted on Nimbex gtt for vent synchrony; Triglycerides being monitored (trend below)  - Multivitamin/mineral, Vitamin C, Vitamin D3 supplementation ordered daily  - NPO (since 2/8); Patient previously noted with good PO intake, consuming % of meals per nursing flow sheets and previous RD report.  - SLP eval 2/3, recommended to remain on Regular texture/thin liquids c aspiration precautions  - Completed Remdesivir and Decadron   - DM, A1c 10.1% (1/31) - NPH & Insulin Sliding Scale ordered to optimize BG (trend below)    GI: last documented BM 2/10 - continues on bowel regimen (miralax, senna); noted pt currently on abx course    Anthrpometric Measurements: No additional weights indicated in chart. Will continue to monitor/trend weight status   Height (cm): 162.6 (01-30-21 @ 21:04)  Weight (kg): 99.8 (01-30-21 @ 21:04)  BMI (kg/m2): 37.7 (01-30-21 @ 21:04)  IBW: 120Ibs/54.5kg  %IBW: 183%    Medications: MEDICATIONS  (STANDING):  ALBUTerol    90 MICROgram(s) HFA Inhaler 1 Puff(s) Inhalation every 6 hours  apixaban 5 milliGRAM(s) Oral two times a day  artificial  tears Solution 1 Drop(s) Both EYES every 12 hours  ascorbic acid 500 milliGRAM(s) Oral daily  atorvastatin 80 milliGRAM(s) Oral at bedtime  chlorhexidine 0.12% Liquid 15 milliLiter(s) Oral Mucosa every 12 hours  chlorhexidine 4% Liquid 1 Application(s) Topical <User Schedule>  cholecalciferol 400 Unit(s) Oral daily  cisatracurium Infusion 3 MICROgram(s)/kG/Min (18 mL/Hr) IV Continuous <Continuous>  dextrose 40% Gel 15 Gram(s) Oral once  dextrose 5%. 1000 milliLiter(s) (50 mL/Hr) IV Continuous <Continuous>  dextrose 5%. 1000 milliLiter(s) (100 mL/Hr) IV Continuous <Continuous>  dextrose 5%. 1000 milliLiter(s) (100 mL/Hr) IV Continuous <Continuous>  dextrose 50% Injectable 25 Gram(s) IV Push once  fentaNYL   Infusion... 2 MICROgram(s)/kG/Hr (9.98 mL/Hr) IV Continuous <Continuous>  glucagon  Injectable 1 milliGRAM(s) IntraMuscular once  insulin lispro (ADMELOG) corrective regimen sliding scale   SubCutaneous every 4 hours  insulin NPH human recombinant 6 Unit(s) SubCutaneous every 6 hours  multivitamin/minerals/iron Oral Solution (CENTRUM) 15 milliLiter(s) Oral daily  norepinephrine Infusion 0.05 MICROgram(s)/kG/Min (9.36 mL/Hr) IV Continuous <Continuous>  pantoprazole  Injectable 40 milliGRAM(s) IV Push daily  piperacillin/tazobactam IVPB.. 3.375 Gram(s) IV Intermittent every 8 hours  polyethylene glycol 3350 17 Gram(s) Oral every 12 hours  propofol Infusion 5 MICROgram(s)/kG/Min (2.99 mL/Hr) IV Continuous <Continuous>  senna Syrup 10 milliLiter(s) Oral at bedtime  sodium chloride 0.45%. 1000 milliLiter(s) (100 mL/Hr) IV Continuous <Continuous>  vasopressin Infusion 0.04 Unit(s)/Min (2.4 mL/Hr) IV Continuous <Continuous>    MEDICATIONS  (PRN):    Labs: 02-11 @ 13:21: Sodium --, Potassium --, Calcium --, Magnesium --, Phosphorus --, BUN --, Creatinine --, Glucose --, Alk Phos --, ALT/SGPT --, AST/SGOT --, Albumin --, Prealbumin --, Total Bilirubin --, Hemoglobin 9.7<L>, Hematocrit 31.8<L>, Ferritin --, C-Reactive Protein --, Creatine Kinase <<27>  02-11 @ 04:07: Sodium 147<H>, Potassium 4.0, Calcium 8.3<L>, Magnesium 2.8<H>, Phosphorus 4.7<H>, BUN 33<H>, Creatinine 1.49<H>, Glucose 328<H>, Alk Phos 126<H>, ALT/SGPT 305<H>, AST/SGOT 492<H>, Albumin 2.8<L>, Prealbumin --, Total Bilirubin 1.6<H>, Hemoglobin 10.9<L>, Hematocrit 35.3, Ferritin --, C-Reactive Protein --, Creatine Kinase <<27>  02-10 @ 18:10: Sodium 150<H>, Potassium 4.9, Calcium 8.7, Magnesium 2.7<H>, Phosphorus 4.4, BUN 28<H>, Creatinine 1.17, Glucose 334<H>, Alk Phos 140<H>, ALT/SGPT 37, AST/SGOT 69<H>, Albumin 3.2<L>, Prealbumin --, Total Bilirubin 0.8, Hemoglobin --, Hematocrit --, Ferritin --, C-Reactive Protein --, Creatine Kinase <<27>  02-10 @ 15:48: Sodium 150<H>, Potassium 4.6, Calcium 8.9, Magnesium 2.8<H>, Phosphorus 2.7, BUN 25<H>, Creatinine 0.93, Glucose 292<H>, Alk Phos 132<H>, ALT/SGPT 37, AST/SGOT 63<H>, Albumin 3.1<L>, Prealbumin --, Total Bilirubin 0.7, Hemoglobin 12.4, Hematocrit 38.8, Ferritin --, C-Reactive Protein --, Creatine Kinase <<27>      Triglycerides, Serum: 422 mg/dL (02-11-21 @ 10:15)  Triglycerides, Serum: 164 mg/dL (01-31-21 @ 10:50)    POCT Blood Glucose.: 316 mg/dL (02-11-21 @ 13:05)  POCT Blood Glucose.: 350 mg/dL (02-11-21 @ 10:01)  POCT Blood Glucose.: 362 mg/dL (02-11-21 @ 01:35)  POCT Blood Glucose.: 260 mg/dL (02-10-21 @ 15:23)    Skin: no pressure injuries noted  Edema: 1+ generalized    Estimated Needs: Recalculated with consideration for intubation and BMI >30  Energy (11-14 kcals/kg): 7322-5948 - based on dosing wt 99.8kg  Protein (1.4-1.8 g/kg):  76.3-98- based on IBW 54.5kg  [place] Equation (REE): 1885 kcals/day    Previous Nutrition Diagnosis:   1) Altered Nutrition Related Lab Values - ongoing, to be addressed with EN provisions & diet education reinforcement as appropriate  2) Food & Nutrition Related Knowledge Deficit - ongoing, to be addressed diet education reinforcement as appropriate   3) Inadequate protein-energy intake - ongoing, to be addressed with EN provisions    New Nutrition Diagnosis:      Recommended Interventions:   1. Recommend change EN regimen to Vital High Protein at trickle rate and as medically feasible/tolerated advance to goal rate of 20mL/hr x 24hrs + 3 'No Carb Prosource'. At goal regimen to provide 480mL formula, 660kcals, 87g protein, 401mL free H2O (meets 7kcals/kg based on dosing wt 99.8kg & 1.6g protein/kg based on IBW 54.5kg)  - If propofol continues infusing at current rate, will provide additional ~790 kcals/day; with EN regimen pt to receive 1450kcals/day (meets ~14.5kcals/kg based on dosing wt 99.8kg)  2. Monitor propofol infusion and triglyceride levels; RD to remain available to adjust EN formulary, volume/rate PRN.   3. Monitor electrolytes and defer free water to team  4. Continue micronutrient supplementation as ordered  5. Monitor BG and adjust insulin regimen PRN    Monitoring and Evaluation:   Continue to monitor nutrition provision and tolerance, weights, labs, skin integrity.   RD remains available upon request and will follow up per protocol.    Shereen Mccann, MS, RD, CDN, [facility]  pager #[phone]

## 2021-02-11 NOTE — PROGRESS NOTE ADULT - SUBJECTIVE AND OBJECTIVE BOX
Neurology Progress Note    S: Patient seen and examined in [facility] ICU now after intubation overnight. on airborn and contact isolation. on norepi and sedated     Medication:  MEDICATIONS  (STANDING):  ALBUTerol    90 MICROgram(s) HFA Inhaler 1 Puff(s) Inhalation every 6 hours  apixaban 5 milliGRAM(s) Oral two times a day  artificial  tears Solution 1 Drop(s) Both EYES every 12 hours  ascorbic acid 500 milliGRAM(s) Oral daily  atorvastatin 80 milliGRAM(s) Oral at bedtime  chlorhexidine 0.12% Liquid 15 milliLiter(s) Oral Mucosa every 12 hours  chlorhexidine 4% Liquid 1 Application(s) Topical <User Schedule>  cholecalciferol 400 Unit(s) Oral daily  cisatracurium Infusion 3 MICROgram(s)/kG/Min (18 mL/Hr) IV Continuous <Continuous>  dextrose 5%. 1000 milliLiter(s) (100 mL/Hr) IV Continuous <Continuous>  dextrose 5%. 1000 milliLiter(s) (50 mL/Hr) IV Continuous <Continuous>  dextrose 5%. 1000 milliLiter(s) (100 mL/Hr) IV Continuous <Continuous>  fentaNYL   Infusion... 2 MICROgram(s)/kG/Hr (9.98 mL/Hr) IV Continuous <Continuous>  glucagon  Injectable 1 milliGRAM(s) IntraMuscular once  insulin regular  human recombinant 10 Unit(s) IV Push once  insulin regular Infusion 5 Unit(s)/Hr (5 mL/Hr) IV Continuous <Continuous>  multivitamin/minerals/iron Oral Solution (CENTRUM) 15 milliLiter(s) Oral daily  norepinephrine Infusion 0.05 MICROgram(s)/kG/Min (9.36 mL/Hr) IV Continuous <Continuous>  pantoprazole  Injectable 40 milliGRAM(s) IV Push daily  piperacillin/tazobactam IVPB.. 3.375 Gram(s) IV Intermittent every 8 hours  polyethylene glycol 3350 17 Gram(s) Oral every 12 hours  propofol Infusion 5 MICROgram(s)/kG/Min (2.99 mL/Hr) IV Continuous <Continuous>  senna Syrup 10 milliLiter(s) Oral at bedtime  sodium chloride 0.45%. 1000 milliLiter(s) (100 mL/Hr) IV Continuous <Continuous>  vasopressin Infusion 0.04 Unit(s)/Min (2.4 mL/Hr) IV Continuous <Continuous>    MEDICATIONS  (PRN):        Vitals:  ICU Vital Signs Last 24 Hrs  T(C): 38.3 (11 Feb 2021 08:00), Max: 38.8 (10 Feb 2021 19:00)  T(F): 100.9 (11 Feb 2021 08:00), Max: 101.8 (10 Feb 2021 19:00)  HR: 70 (11 Feb 2021 15:58) (70 - 131)  BP: --  BP(mean): --  ABP: 123/84 (11 Feb 2021 10:00) (82/67 - 146/91)  ABP(mean): 100 (11 Feb 2021 10:00) (74 - 114)  RR: 25 (11 Feb 2021 10:00) (25 - 29)  SpO2: 98% (11 Feb 2021 15:58) (86% - 100%)          General Exam:   General Appearance: Appropriately dressed and in no acute distress       Head: Normocephalic, atraumatic and no dysmorphic features  Ear, Nose, and Throat: Moist mucous membranes   CVS: S1S2+  Resp: + vent and intubated   Abd: soft NTND  Extremities: No edema, no cyanosis  Skin: No bruises, no rashes     Neurological Exam: (intubated and sedated)  Mental Status: eyes closed. no verbal not following.   Cranial Nerves: PERRL, + gag, + corenals, + OCC, ,opens eyes to noxious  Motor: no spontaneous  Sensation: withdraws x 4  Reflexes: 1+ throughout at biceps, brachioradialis, triceps, patellars and ankles bilaterally and equal. No clonus. R toe and L toe were both downgoing.  Coordination: unable  Gait: deferred     I personally reviewed the below data/images/labs:    CBC Full  -  ( 11 Feb 2021 13:21 )  WBC Count : 16.54 K/uL  RBC Count : 3.45 M/uL  Hemoglobin : 9.7 g/dL  Hematocrit : 31.8 %  Platelet Count - Automated : 122 K/uL  Mean Cell Volume : 92.2 fl  Mean Cell Hemoglobin : 28.1 pg  Mean Cell Hemoglobin Concentration : 30.5 gm/dL  Auto Neutrophil # : x  Auto Lymphocyte # : x  Auto Monocyte # : x  Auto Eosinophil # : x  Auto Basophil # : x  Auto Neutrophil % : x  Auto Lymphocyte % : x  Auto Monocyte % : x  Auto Eosinophil % : x  Auto Basophil % : x    02-11    146<H>  |  111<H>  |  36<H>  ----------------------------<  378<H>  5.0   |  23  |  1.29    Ca    7.8<L>      11 Feb 2021 13:21  Phos  4.7     02-11  Mg     2.8     02-11    TPro  5.5<L>  /  Alb  2.5<L>  /  TBili  1.8<H>  /  DBili  x   /  AST  337<H>  /  ALT  256<H>  /  AlkPhos  122<H>  02-11        Radiology:  -CT Head - unremarkable  -CTA head and neck 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    < from: MR Angio Neck w/wo IV Cont (02.02.21 @ 00:16) >    EXAM:  MR ANGIO BRAIN                          EXAM:  MR ANGIO NECK WAW IC                          EXAM:  MR BRAIN                            PROCEDURE DATE:  02/02/2021            INTERPRETATION:  CLINICAL INDICATION: COVID Positive, basilar stenosis, 70-year-old female with stroke, right-sided weakness and speech for 3 days, altered mental status,    Technique: Contrast brain MRI, non-contrast brain MRA and contrast neck MRA was performed.    Through the brain, sagittal and axial T1, axial T2, FLAIR, diffusion weighted images and an ADC map were obtained. Axial T1 post-contrast images were obtained and reconstructed in sagittal and coronal planes. 10 cc of intravenous gadolinium Gadavist gadolinium was administered and 0 discarded,  for contrast MRI brain and contrast enhanced MR angiography of the extracranial circulation.    MR angiography of intracranial and extracranial circulation was performed with time of flight imaging technique. Maximal intensity projection images were reviewed in multiple planes.    COMPARISON: Head CT, CT perfusion, CTA brain and neck, 1/30/2021    FINDINGS:  Brain MRI:  Foci restricted diffusion with hyperintense DWI, T2, FLAIR signal, left parietal lobe just above the occipital lobe (4:20, 400:20) concerning for new ischemia without hemorrhagic transformation. There is moderate enlargement of the ventricles and sulci greater than expected for age consistent with volume loss. There are foci of encephalomalacia including not limited to the corpus callosum genu and body (3:13). There is encephalomalacia with gliosis along the central and left paramedian goran, correlate with any prior sequela of remote ischemia (7:10, 6:9) with Wallerian degeneration  left cerebral peduncle, goran, and medullary pyramid (7:11-7). There is nonspecific foci of T2/FLAIR signal hyperintensity in the hemispheric white matter, likely microvascular disease, infectious and/or inflammatory etiologies in patient with COVID positivity not excluded with faint FLAIR hyperintensity noted along the bilateral olfactory gyri. There is a partially empty sella. No intraparenchymal hematoma, mass, extra-axial collection or midline shift. Basal cisterns remain patent.    Intact calvarium. Orbits and mastoids are unremarkable. Left maxillary mucosal thickening, with retention cyst and polyps, with mild ethmoid, frontal and sphenoid mucosal thickening.      Brain MRA:  Motion limited MRA,, calcified noncalcified plaque, causes multifocal stenosis bilateral cavernous and supraclinoid ICAs with 3.9 x 2.6 x 2.3 mm AP, TR, CC blister aneurysm extending inferiorly  right internal common carotid artery unchanged. There is flow-related signal  bilateral ICA cavernous and supraclinoid segments with  multifocal stenosis and poststenotic dilatation. Stenosis the right proximal right A1 and M1 segments (6:77). Redemonstration, stenosis and narrowing along the distal right M1, decreased right MCA branches, patent left A1, proximal A2 segments with multifocal stenosis anterior cerebral arteries. Stenosis distal left M1, multifocal stenosis distal left MCA branches, assessment limited by motion artifact.    Fetal bilateral PCAs, dominant anterior circulation, right vertebral artery crosses leftward, vertebral arteries are patent to vertebrobasilar junction, severe stenosis proximal basilar artery  above the vertebrobasilar junction with reconstitution distal basilar via P1 segments and fetal PCA's, multifocal stenosis PCA branch vessels.    Neck MRA:  The aortic arch and origins of the great vessels origins are patent..    Common and Internal Carotids: Tortuous bilateral common carotid arteries with retropharyngeal extension consistent with kissing carotids (5:35) no hemodynamically significant stenosis by NASCET criteria along origin of either right or left internal or external carotid artery.    Vertebral arteries:  Tortuous course and caliber of the bilateral vertebral arteries correlate with hypertension vessels are patent to intracranial V4 segments.    IMPRESSION:    Foci  restricted diffusion, DWI hyperintensity left inferior parietal lobe, no hemorrhagic transformation. Multifocal  T2 and FLAIR hyperintensity white matter may reflect microvascular disease,  additional etiologies not excluded with  COVIDpositivity.    Volume loss, multifocal encephalomalacia  corpus callosum body and splenium, Wallerian degeneration, left paramedian goran, medullary pyramid, no new hemorrhage or midline shift.    Severe flow limiting stenosis of the proximal basilar artery above the vertebral basilar junction with fetal origin PCAs, reconstitution of the distal basilar artery via the bilateral P1 segments.    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    Tortuous extracranial vessels likely from hypertension without hemodynamic significant stenosis at ICA origins by NASCET criteria. Tortuous vertebral arteries patent to V4 segments.    Findings discussed with Dr. Butler of neurology, at immediate time of review, 2/2/2021 at 9:10 AM.                JUDY NAILS MD; Attending Radiologist  This document has been electronically signed. Feb 2 2021  9:14AM    < end of copied text >

## 2021-02-11 NOTE — PROGRESS NOTE ADULT - ASSESSMENT
71 yo RH F with HTN, HLD, DM stroke 2016 with slurred speech and R weakness found to have COVID 19 PNA as well as UTI.  sent to [facility] as CTA demonstrates severe BA focal stenosis.  A1c 10.1%, LDL 150mg/dL, MRI with L parietal infarct, MRA with severe BA stenosis and fetal PCAs and supraclinoid ICA blister aneurysm, also multifocal stenosis in  likely large artery athero. LA neg,  doubt vasculitis. no HA. Note Parietal infarct is anterior circulation not related to BA stenosis. DRVVT/LA neg. RRT 2/8 for hypoxia, transferred to covid ICU overnight 2/10 now intubated on prop/fentanyl and norepi and vaso  - r/o PE  - now intubated and sedated  - wean sedation as possible  - avoid hypotension given severe BA stenosis. now on Norepi  - APLS labs including beta 2 glycoprotein, lupus anticoagulant (negative) and cardiolipin Abs (neg).  If APLS labs negative can place on Eliquis 5mg BID x 1 month followed by ASA 81 and plavix 75mg daily.  - suggest diagnostic angio with INR Dr. Hernandez - multifocal stenosis including severe BA stenosis, blister aneurysm.  will likely defer given COVID status and unlikely to change current management. will need to schedule in future. f/u with neurosx  - infectious workup, on zosyn   - spoke with Nsx resident Dr. León requesting consult   - high dose statin, lipitor 80mg daily  - treat infection s/p remdesiver and decadron for covid infection  - monitor inflammatory markers.   - on decadron  - avoid hypotension  - PT/OT  - check FS, glucose control <180  - GI/DVT ppx  - Counseling on diet, exercise, and medication adherence was done  - Counseling on smoking cessation and alcohol consumption offered when appropriate.  - Pain assessed and judicious use of narcotics when appropriate was discussed.    - Stroke education given when appropriate.  - Importance of fall prevention discussed.   - Differential diagnosis and plan of care discussed with patient and/or family and primary team  - Thank you for allowing me to participate in the care of this patient. Call with questions.   Naga De Leon MD  Vascular Neurology  Office: [phone]

## 2021-02-11 NOTE — PROGRESS NOTE ADULT - PROBLEM SELECTOR PLAN 2
Patient requesting if he could get a script for some water pill to take down the swelling in his legs. Patient can be reached at [phone].   Discontinue Statin at this time due to elevated LFTs.

## 2021-02-11 NOTE — PROGRESS NOTE ADULT - ASSESSMENT
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 Hernandez - multifocal stenosis including severe BA stenosis, blister aneurysm--deffered for now for COVID     Pulm:   - Now on 25/360/14/100%   - 7.29/ 57/129/26 ON Fio2 100% p/f 129    CV:  Hx of HTN, HLD  - Levo @ 0.6 & Vaso 0.04  - TTE on 2/10  unable to evaluate LV systolic function and wall motion  accurately .The function appears preserved. The LV appears small  - Continue Lipitor 80mg daily     GI:   Transaminitis  - LFTs uptrending x 3x ULN  - Tolerating TF    - PPI GI ppx daily  - Last BM 2/10  -  Continue Bowel Regimen with Miralax & senna  - f/u CMP   - f/u Abdominal U/S    :   OMARI, Hypernatremia   - Continue 1/2 NS at 100cc/hr  - Alexander in place  - Monitor Urine Output   - Monitor lytes, replete as necessary       Heme:  - Continue Eliquis   - Monitor H/H and Plts   - f/u CBC     ID:   - Blood Cx 1/31 Neg, Sputum Cx G+ cocci in pairs (2/11), MRSA neg 02/05 s/p Vanco x1  - F/u Repeat Blood Cx 2/10   - f/u Urine Cx 2/10   - Continue Zosyn for empiric coverage ( 02/10- 02/17)    Endo:  - Start NPH 6U q 6   - Monitor FGS q 4 hr          Neuro:   Embolic Stroke  - Sedated on Prop @50, Fent @2, s/p Versed push   - Restarted on Nimbex gtt for vent synchrony  - 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 Hernandez - multifocal stenosis including severe BA stenosis, blister aneurysm--deferred for now due to COVID     Pulm:   - Continue Mechanical Ventilation, wean as tolerated   - Currently: A/C 25/360/14/80% f/u ABG   - f/u CTA chest with IV contrast for possible PE   - Plan to prone     CV:  Hx of HTN, HLD  - Levo @ 0.6 & Vaso 0.04, wean as tolerated for goal MAP >65  - TTE on 2/10  unable to evaluate LV systolic function and wall motion  accurately .The function appears preserved. The LV appears small  - Continue Lipitor 80mg daily   - Monitor CVP     GI:   Transaminitis  - Tolerating TF   - LFTs uptrending x 3x ULN   - PPI GI ppx daily  - Last BM 2/10  - Continue Bowel Regimen with Miralax & senna  - f/u Abdominal U/S    :   OMARI, Hypernatremia   - Continue 1/2 NS at 100cc/hr  - Alexander in place  - Monitor Urine Output   - Monitor lytes, replete as necessary   - f/u CMP       Heme:  - Continue Eliquis   - Monitor H/H and Plts   - f/u CBC   - f/u LE duplex 2/11     ID:   - Blood Cx 1/31 Neg, Sputum Cx G+ cocci in pairs (2/11), MRSA neg 02/05 s/p Vanco x1  - Continue Zosyn for empiric coverage ( 02/10- 02/17)  - F/u Repeat Blood Cx 2/10   - f/u Urine Cx 2/10   COVID ( + on 1/30)   - completed Remdesivir and Decadron   - Monitor Inflammatory Markers     Endo:  - Start NPH 6U q 6   - Monitor FGS q 4 hr   - f/u AM Cortisol     Discharge:  Full Code          Neuro:   Embolic Stroke  - Sedated on Prop @50, Fent @2, s/p Versed push   - Restarted on Nimbex gtt for vent synchrony  - 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 Hernandez - multifocal stenosis including severe BA stenosis, blister aneurysm--deferred for now due to COVID     Pulm:   - Continue Mechanical Ventilation, wean as tolerated   - Currently: A/C 25/360/14/80% f/u ABG   - f/u CTA chest with IV contrast for possible PE   - Plan to prone     CV:  Hx of HTN, HLD  - Levo @ 0.6 & Vaso 0.04, wean as tolerated for goal MAP >65  - TTE on 2/10  unable to evaluate LV systolic function and wall motion  accurately .The function appears preserved. The LV appears small  - Continue Lipitor 80mg daily   - Monitor CVP     GI:   Transaminitis  - Tolerating TF   - LFTs uptrending x 3x ULN   - PPI GI ppx daily  - Last BM 2/10  - Continue Bowel Regimen with Miralax & senna  - f/u Abdominal U/S    :   OMARI, Hypernatremia   - Continue 1/2 NS at 100cc/hr  - Alexander in place  - Monitor Urine Output   - Monitor lytes, replete as necessary   - f/u CMP       Heme:  - Continue Eliquis   - Monitor H/H and Plts   - f/u CBC   - f/u LE duplex 2/11     ID:   - Blood Cx 1/31 Neg, Sputum Cx G+ cocci in pairs (2/11), MRSA neg 02/05 s/p Vanco x1  - Continue Zosyn for empiric coverage ( 02/10- 02/17)  - F/u Repeat Blood Cx 2/10   - f/u Urine Cx 2/10   COVID ( + on 1/30)   - completed Remdesivir and Decadron   - Monitor Inflammatory Markers     Endo:  - Start NPH 6U q 6   - Monitor FGS q 4 hr   - f/u AM Cortisol     Ethics/Disposition:  Full Code          Neuro:   Embolic Stroke  - Sedated on Prop @50, Fent @2, s/p Versed push   - Restarted on Nimbex gtt for vent synchrony  - 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 Hernandez - multifocal stenosis including severe BA stenosis, blister aneurysm--deferred for now due to COVID     Pulm:   - Continue Mechanical Ventilation, wean as tolerated   - Currently: A/C 25/360/14/80% f/u ABG   - f/u CTA chest with IV contrast for possible PE   - Plan to prone     CV:  Hx of HTN, HLD  - Levo @ 0.6 & Vaso 0.04, wean as tolerated for goal MAP >65  - TTE on 2/10  unable to evaluate LV systolic function and wall motion  accurately .The function appears preserved. The LV appears small  - Continue Lipitor 80mg daily   - Monitor CVP     GI:   Transaminitis  - Tolerating TF   - LFTs uptrending x 3x ULN   - PPI GI ppx daily  - Last BM 2/10  - Continue Bowel Regimen with Miralax & senna  - f/u Abdominal U/S    :   OMARI, Hypernatremia   - Continue 1/2 NS at 100cc/hr  - Alexander in place  - Monitor Urine Output   - Monitor lytes, replete as necessary   - f/u CMP       Heme:  - Continue Eliquis   - Monitor H/H and Plts   - f/u CBC   - f/u LE duplex 2/11     ID:   - Blood Cx 1/31 Neg, Sputum Cx G+ cocci in pairs (2/11), MRSA neg 02/05 s/p Vanco x1  - Completed Ceftriaxone IV x3 for E.coli ( 1/29-2/1/2021)  - Continue Zosyn for empiric coverage ( 02/10- 02/17)  - F/u Repeat Blood Cx 2/10   - f/u Urine Cx 2/10   COVID ( + on 1/30)   - completed Remdesivir and Decadron   - Monitor Inflammatory Markers     Endo:  - Start NPH 6U q 6   - Monitor FGS q 4 hr   - f/u AM Cortisol     Ethics/Disposition:  Full Code          Neuro:   Embolic Stroke  - Sedated on Prop @50, Fent @2, s/p Versed push   - Restarted on Nimbex gtt for vent synchrony  - 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 Hernandez - multifocal stenosis including severe BA stenosis, blister aneurysm--deferred for now due to COVID     Pulm:   - Continue Mechanical Ventilation, wean as tolerated   - Currently: A/C 25/360/14/80% f/u ABG   - f/u CTA chest with IV contrast for possible PE   - Plan to prone after CTA performed     CV:  Hx of HTN, HLD  - Levo @ 0.6 & Vaso 0.04, wean as tolerated for goal MAP >65  - TTE on 2/10  unable to evaluate LV systolic function and wall motion  accurately .The function appears preserved. The LV appears small  - Continue Lipitor 80mg daily   - Monitor CVP     GI:   Transaminitis  - Tolerating TF   - LFTs uptrending x 3x ULN   - PPI GI ppx daily  - Last BM 2/10  - Continue Bowel Regimen with Miralax & senna  - f/u Abdominal U/S    :   OMARI, Hypernatremia   - Continue 1/2 NS at 100cc/hr  - Alexander in place  - Monitor Urine Output   - Monitor lytes, replete as necessary   - f/u CMP       Heme:  - Continue Eliquis   - Monitor H/H and Plts   - f/u CBC   - f/u LE duplex 2/11     ID:   - Blood Cx 1/31 Neg, Sputum Cx G+ cocci in pairs (2/11), MRSA neg 02/05 s/p Vanco x1  - Completed Ceftriaxone IV x3 for E.coli ( 1/29-2/1/2021)  - Continue Zosyn for empiric coverage ( 02/10- 02/17)  - F/u Repeat Blood Cx 2/10   - f/u Urine Cx 2/10   COVID ( + on 1/30)   - completed Remdesivir and Decadron   - Monitor Inflammatory Markers     Endo:  - Start NPH 6U q 6   - Monitor FGS q 4 hr   - f/u AM Cortisol     Ethics/Disposition:  Full Code        69 y/o F with PMH of HTN, HLD, uncontrolled T2DM ( HgbA1c 10.1 on 1/31/21) c/b neuropathy, Chronic back pain with Sciatica, and CVA in 2015 was transferred to 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 Hospital course c/b UTI with E.coli ( tx ceftriaxone) and severe ARDS due to COVID. Patient remains in ICU setting for ongoing management.     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 Hernandez - multifocal stenosis including severe BA stenosis, blister aneurysm--deferred for now due to COVID     Pulm:   - Continue Mechanical Ventilation, wean as tolerated   - Currently: A/C 25/360/14/80% f/u ABG   - f/u CTA chest with IV contrast for possible PE   - Plan to prone after CTA performed     CV:  Hx of HTN, HLD  - Levo @ 0.6 & Vaso 0.04, wean as tolerated for goal MAP >65  - TTE on 2/10  unable to evaluate LV systolic function and wall motion  accurately .The function appears preserved. The LV appears small  - Continue Lipitor 80mg daily   - Monitor CVP     GI:   Transaminitis  - Tolerating TF   - LFTs uptrending x 3x ULN   - PPI GI ppx daily  - Last BM 2/10  - Continue Bowel Regimen with Miralax & senna  - f/u Abdominal U/S    :   OMARI, Hypernatremia   - Continue 1/2 NS at 100cc/hr  - Alexander in place  - Monitor Urine Output   - Monitor lytes, replete as necessary   - f/u CMP       Heme:  - Continue Eliquis   - Monitor H/H and Plts   - f/u LE duplex 2/11     ID:   - Blood Cx 1/31 Neg, Sputum Cx G+ cocci in pairs (2/11), MRSA neg 02/05 s/p Vanco x1  - Completed Ceftriaxone IV x3 for E.coli ( 1/29-2/1/2021)  - Continue Zosyn for empiric coverage ( 02/10- 02/17)  - F/u Repeat Blood Cx 2/10   - f/u Urine Cx 2/10   COVID ( + on 1/30)   - completed Remdesivir and Decadron   - Monitor Inflammatory Markers     Endo:  - Start NPH 6U q 6   - Monitor FGS q 4 hr   - f/u AM Cortisol     Ethics/Disposition:  Full Code        69 y/o F with PMH of HTN, HLD, uncontrolled T2DM ( HgbA1c 10.1 on 1/31/21) c/b neuropathy, Chronic back pain with Sciatica, and CVA in 2015 was transferred to [facility] 1/31 for possible endovascular intervention of 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 Hospital course c/b UTI with E.coli ( s/p ceftriaxone) and severe ARDS due to COVID. Patient remains in ICU setting for ongoing management.     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 Hernandez - multifocal stenosis including severe BA stenosis, blister aneurysm--deferred for now due to COVID     Pulm:   - Continue Mechanical Ventilation, wean as tolerated   - Currently: A/C 25/360/14/80% f/u ABG   - f/u CTA chest with IV contrast for possible PE   - Plan to prone after CTA performed     CV:  Hx of HTN, HLD  - Levo @ 0.6 & Vaso 0.04, wean as tolerated for goal MAP >65  - TTE on 2/10  unable to evaluate LV systolic function and wall motion  accurately .The function appears preserved. The LV appears small  - Continue Lipitor 80mg daily   - Monitor CVP     GI:   Transaminitis  - Tolerating TF   - LFTs uptrending x 3x ULN   - PPI GI ppx daily  - Last BM 2/10  - Continue Bowel Regimen with Miralax & senna  - f/u Abdominal U/S    :   OMARI, Hypernatremia   - Continue 1/2 NS at 100cc/hr  - Alexander in place  - Monitor Urine Output   - Monitor lytes, replete as necessary   - f/u CMP       Heme:  - Continue Eliquis   - Monitor H/H and Plts   - f/u LE duplex 2/11     ID:   - Blood Cx 1/31 Neg, Sputum Cx G+ cocci in pairs (2/11), MRSA neg 02/05 s/p Vanco x1  - Completed Ceftriaxone IV x3 for E.coli ( 1/29-2/1/2021)  - Continue Zosyn for empiric coverage ( 02/10- 02/17)  - F/u Repeat Blood Cx 2/10   - f/u Urine Cx 2/10   COVID ( + on 1/30)   - completed Remdesivir and Decadron   - Monitor Inflammatory Markers     Endo:  - Start NPH 6U q 6   - Monitor FGS q 4 hr   - f/u AM Cortisol     Ethics/Disposition:  Full Code        69 y/o F with PMH of HTN, HLD, uncontrolled T2DM ( HgbA1c 10.1 on 1/31/21) c/b neuropathy, Chronic back pain with Sciatica, and CVA in 2015 was transferred to [facility] 1/31 for possible endovascular intervention of 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 Hospital course c/b UTI with E.coli ( s/p ceftriaxone) and severe ARDS due to COVID. Patient remains in ICU setting for ongoing management.     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 Hernandez - multifocal stenosis including severe BA stenosis, blister aneurysm--deferred for now due to COVID     Pulm:   - Continue Mechanical Ventilation, wean as tolerated   - Currently: A/C 25/360/14/80% f/u ABG   - f/u CTA chest with IV contrast for possible PE   - Plan to prone after CTA performed     CV:  Hx of HTN, HLD  - Levo @ 0.6 & Vaso 0.04, wean as tolerated for goal MAP >65  - TTE on 2/10  unable to evaluate LV systolic function and wall motion  accurately .The function appears preserved. The LV appears small  - Continue Lipitor 80mg daily   - Monitor CVP     GI:   Transaminitis  - Tolerating TF   - LFTs uptrending x 3x ULN   - PPI GI ppx daily  - Last BM 2/10  - Continue Bowel Regimen with Miralax & senna  - f/u Abdominal U/S    :   OMARI, Hypernatremia   - Continue 1/2 NS at 100cc/hr  - Alexander in place  - Monitor Urine Output   - Monitor lytes, replete as necessary   - f/u CMP       Heme:  - Continue Eliquis   - Monitor H/H and Plts   - f/u LE duplex 2/11     ID:   - Blood Cx 1/31 Neg, Sputum Cx G+ cocci in pairs (2/11), MRSA neg 02/05 s/p Vanco x1  - Completed Ceftriaxone IV x3 for E.coli ( 1/29-2/1/2021)  - Continue Zosyn for empiric coverage ( 02/10- 02/17)  - F/u Repeat Blood Cx 2/10   - f/u Urine Cx 2/10   COVID ( + on 1/30)   - completed Remdesivir and Decadron   - Monitor Inflammatory Markers     Endo:  - Start Insulin gtts  - Monitor FGS q 1 hr   - f/u AM Cortisol     Ethics/Disposition:  Full Code

## 2021-02-11 NOTE — PROGRESS NOTE ADULT - SUBJECTIVE AND OBJECTIVE BOX
HPI: 69 y/o F with PMH of HTN, HLD, uncontrolled T2DM ( HgbA1c 10.1 on 21) c/b neuropathy, Chronic back pain with Sciatica, and CVA in  resulting in slurred speech, difficulty walking, and right sided weakness using a cane at baseline and is A,Ox3. 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  Patient also admitted for sepsis 2/2 UTI and acute respiratory failure with hypoxia due to COVID-19 infection requiring BiPAP. Patient intubated 2/10 s/p RRT for AMS and increased oxygenation requirements while on BiPAP, and transferred to ICU setting for ongoing management.      Interval Events: Nimbex discontinued and patient became desynchronous with the vent requiring Versed. Patient remained supine.       REVIEW OF SYSTEMS:  [X] Unable to assess ROS because intubated/sedated.    OBJECTIVE:  ICU Vital Signs Last 24 Hrs  T(C): 37.9 (2021 04:00), Max: 38.9 (10 Feb 2021 17:00)  T(F): 100.2 (2021 04:00), Max: 102 (10 Feb 2021 17:00)  HR: 106 (2021 06:00) (94 - 146)  BP: 155/98 (10 Feb 2021 11:32) (155/98 - 155/98)  BP(mean): --  ABP: 123/82 (2021 06:00) (63/51 - 146/91)  ABP(mean): 99 (2021 06:00) (56 - 112)  RR: 27 (2021 06:00) (20 - 28)  SpO2: 92% (2021 06:00) (82% - 98%)    Mode: AC/ CMV (Assist Control/ Continuous Mandatory Ventilation), RR (machine): 25, TV (machine): 360, FiO2: 70, PEEP: 12, ITime: 1, MAP: 17, PIP: 29    - @ 07:01  -  -10 @ 07:00  --------------------------------------------------------  IN: 0 mL / OUT: 1401 mL / NET: -1401 mL    02-10 @ 07:01    02-11 @ 06:33  --------------------------------------------------------  IN: 1233.3 mL / OUT: 1105 mL / NET: 128.3 mL      CAPILLARY BLOOD GLUCOSE      POCT Blood Glucose.: 362 mg/dL (2021 01:35)      PHYSICAL EXAM:  General: intuabted on ventilator.   HEENT/Neck: supple and atraumatic.   Respiratory: On ventilator.   Extremities/Skin: warm and dry.   Neurological: sedated RASS -4    LINES:  RIJ Central line 2/10   Left radial arterial line 2/10    HOSPITAL MEDICATIONS:  MEDICATIONS  (STANDING):  ALBUTerol    90 MICROgram(s) HFA Inhaler 1 Puff(s) Inhalation every 6 hours  apixaban 5 milliGRAM(s) Oral two times a day  artificial  tears Solution 1 Drop(s) Both EYES every 12 hours  ascorbic acid 500 milliGRAM(s) Oral daily  atorvastatin 80 milliGRAM(s) Oral at bedtime  chlorhexidine 0.12% Liquid 15 milliLiter(s) Oral Mucosa every 12 hours  chlorhexidine 4% Liquid 1 Application(s) Topical <User Schedule>  cholecalciferol 400 Unit(s) Oral daily  cisatracurium Infusion 3 MICROgram(s)/kG/Min (18 mL/Hr) IV Continuous <Continuous>  dextrose 40% Gel 15 Gram(s) Oral once  dextrose 5%. 1000 milliLiter(s) (50 mL/Hr) IV Continuous <Continuous>  dextrose 5%. 1000 milliLiter(s) (100 mL/Hr) IV Continuous <Continuous>  dextrose 5%. 1000 milliLiter(s) (100 mL/Hr) IV Continuous <Continuous>  dextrose 50% Injectable 25 Gram(s) IV Push once  fentaNYL   Infusion... 2 MICROgram(s)/kG/Hr (9.98 mL/Hr) IV Continuous <Continuous>  glucagon  Injectable 1 milliGRAM(s) IntraMuscular once  insulin glargine Injectable (LANTUS) 20 Unit(s) SubCutaneous at bedtime  insulin lispro (ADMELOG) corrective regimen sliding scale   SubCutaneous every 4 hours  multivitamin 1 Tablet(s) Oral daily  norepinephrine Infusion 0.05 MICROgram(s)/kG/Min (9.36 mL/Hr) IV Continuous <Continuous>  pantoprazole  Injectable 40 milliGRAM(s) IV Push daily  piperacillin/tazobactam IVPB.. 3.375 Gram(s) IV Intermittent every 8 hours  polyethylene glycol 3350 17 Gram(s) Oral daily  propofol Infusion 50 MICROgram(s)/kG/Min (29.9 mL/Hr) IV Continuous <Continuous>  sodium chloride 0.45%. 1000 milliLiter(s) (100 mL/Hr) IV Continuous <Continuous>  vasopressin Infusion 0.04 Unit(s)/Min (2.4 mL/Hr) IV Continuous <Continuous>    MEDICATIONS  (PRN):      LABS:                        10.9   19.55 )-----------( 161      ( 2021 04:07 )             35.3     Hgb Trend: 10.9<--, 12.4<--, 12.2<--, 12.7<--, 12.8<--  02    147<H>  |  108  |  33<H>  ----------------------------<  328<H>  4.0   |  25  |  1.49<H>    Ca    8.3<L>      2021 04:07  Phos  4.7     11  Mg     2.8     11    TPro  6.3  /  Alb  2.8<L>  /  TBili  1.6<H>  /  DBili  x   /  AST  492<H>  /  ALT  305<H>  /  AlkPhos  126<H>      Creatinine Trend: 1.49<--, 1.17<--, 0.93<--, 0.97<--, 1.14<--, 1.05<--  PT/INR - ( 10 Feb 2021 18:27 )   PT: 20.9 sec;   INR: 1.79 ratio         PTT - ( 2021 04:07 )  PTT:25.4 sec  Urinalysis Basic - ( 10 Feb 2021 18:10 )    Color: Light Yellow / Appearance: Clear / S.011 / pH: x  Gluc: x / Ketone: Negative  / Bili: Negative / Urobili: Negative   Blood: x / Protein: 30 mg/dL / Nitrite: Negative   Leuk Esterase: Negative / RBC: 3 /hpf / WBC 2 /HPF   Sq Epi: x / Non Sq Epi: 2 /hpf / Bacteria: Negative      Arterial Blood Gas:   @ 04:04  7.30/53/74/25/91/-1.3  ABG lactate: --  Arterial Blood Gas:   @ 01:17  7.32/50/82/25/94/-.7  ABG lactate: --  Arterial Blood Gas:  02-10 @ 23:41  7.34/46/105/24/97/-1.8  ABG lactate: --  Arterial Blood Gas:  02-10 @ 20:23  7.32/51/106/26/96/-.6  ABG lactate: --  Arterial Blood Gas:  02-10 @ 18:07  7.34/52/71/27/91/1.0  ABG lactate: --  Arterial Blood Gas:  02-10 @ 15:39  7.48/39/66/29/93/5.4  ABG lactate: --        MICROBIOLOGY:     Culture - Sputum (collected 21 @ 00:33)  Source: .Sputum sputum  Gram Stain (21 @ 06:04):    Rare polymorphonuclear leukocytes seen per low power field    Rare Squamous epithelial cells seen per low power field    Rare Gram positive cocci in pairs seen per oil power field        MEHDI Garcia #[id number] HPI: 69 y/o F with PMH of HTN, HLD, uncontrolled T2DM ( HgbA1c 10.1 on 21) c/b neuropathy, Chronic back pain with Sciatica, and CVA in  resulting in slurred speech, difficulty walking, and right sided weakness using a cane at baseline and is A,Ox3. 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  Patient also admitted for sepsis 2/2 UTI and acute respiratory failure with hypoxia due to COVID-19 infection () requiring BiPAP. Patient intubated /10 s/p RRT for AMS and increased oxygenation requirements while on BiPAP, and transferred to ICU setting for ongoing management.      Interval Events: Nimbex discontinued and patient became desynchronous with the vent requiring Versed. Patient remained supine.       REVIEW OF SYSTEMS:  [X] Unable to assess ROS because intubated/sedated.    OBJECTIVE:  ICU Vital Signs Last 24 Hrs  T(C): 37.9 (2021 04:00), Max: 38.9 (10 Feb 2021 17:00)  T(F): 100.2 (2021 04:00), Max: 102 (10 Feb 2021 17:00)  HR: 106 (2021 06:00) (94 - 146)  BP: 155/98 (10 Feb 2021 11:32) (155/98 - 155/98)  BP(mean): --  ABP: 123/82 (2021 06:00) (63/51 - 146/91)  ABP(mean): 99 (2021 06:00) (56 - 112)  RR: 27 (2021 06:00) (20 - 28)  SpO2: 92% (2021 06:00) (82% - 98%)    Mode: AC/ CMV (Assist Control/ Continuous Mandatory Ventilation), RR (machine): 25, TV (machine): 360, FiO2: 70, PEEP: 12, ITime: 1, MAP: 17, PIP: 29    02- @ 07:01  -  02-10 @ 07:00  --------------------------------------------------------  IN: 0 mL / OUT: 1401 mL / NET: -1401 mL    02-10 @ 07: @ 06:33  --------------------------------------------------------  IN: 1233.3 mL / OUT: 1105 mL / NET: 128.3 mL      CAPILLARY BLOOD GLUCOSE      POCT Blood Glucose.: 362 mg/dL (2021 01:35)      PHYSICAL EXAM:  General: intuabted on ventilator.   HEENT/Neck: supple and atraumatic.   Respiratory: On ventilator.   Extremities/Skin: warm and dry.   Neurological: sedated RASS -4    LINES:  RIJ Central line 2/10   Left radial arterial line 2/10    [facility] MEDICATIONS:  MEDICATIONS  (STANDING):  ALBUTerol    90 MICROgram(s) HFA Inhaler 1 Puff(s) Inhalation every 6 hours  apixaban 5 milliGRAM(s) Oral two times a day  artificial  tears Solution 1 Drop(s) Both EYES every 12 hours  ascorbic acid 500 milliGRAM(s) Oral daily  atorvastatin 80 milliGRAM(s) Oral at bedtime  chlorhexidine 0.12% Liquid 15 milliLiter(s) Oral Mucosa every 12 hours  chlorhexidine 4% Liquid 1 Application(s) Topical <User Schedule>  cholecalciferol 400 Unit(s) Oral daily  cisatracurium Infusion 3 MICROgram(s)/kG/Min (18 mL/Hr) IV Continuous <Continuous>  dextrose 40% Gel 15 Gram(s) Oral once  dextrose 5%. 1000 milliLiter(s) (50 mL/Hr) IV Continuous <Continuous>  dextrose 5%. 1000 milliLiter(s) (100 mL/Hr) IV Continuous <Continuous>  dextrose 5%. 1000 milliLiter(s) (100 mL/Hr) IV Continuous <Continuous>  dextrose 50% Injectable 25 Gram(s) IV Push once  fentaNYL   Infusion... 2 MICROgram(s)/kG/Hr (9.98 mL/Hr) IV Continuous <Continuous>  glucagon  Injectable 1 milliGRAM(s) IntraMuscular once  insulin glargine Injectable (LANTUS) 20 Unit(s) SubCutaneous at bedtime  insulin lispro (ADMELOG) corrective regimen sliding scale   SubCutaneous every 4 hours  multivitamin 1 Tablet(s) Oral daily  norepinephrine Infusion 0.05 MICROgram(s)/kG/Min (9.36 mL/Hr) IV Continuous <Continuous>  pantoprazole  Injectable 40 milliGRAM(s) IV Push daily  piperacillin/tazobactam IVPB.. 3.375 Gram(s) IV Intermittent every 8 hours  polyethylene glycol 3350 17 Gram(s) Oral daily  propofol Infusion 50 MICROgram(s)/kG/Min (29.9 mL/Hr) IV Continuous <Continuous>  sodium chloride 0.45%. 1000 milliLiter(s) (100 mL/Hr) IV Continuous <Continuous>  vasopressin Infusion 0.04 Unit(s)/Min (2.4 mL/Hr) IV Continuous <Continuous>    MEDICATIONS  (PRN):      LABS:                        10.9   19.55 )-----------( 161      ( 2021 04:07 )             35.3     Hgb Trend: 10.9<--, 12.4<--, 12.2<--, 12.7<--, 12.8<--  02-11    147<H>  |  108  |  33<H>  ----------------------------<  328<H>  4.0   |  25  |  1.49<H>    Ca    8.3<L>      2021 04:07  Phos  4.7     11  Mg     2.8     11    TPro  6.3  /  Alb  2.8<L>  /  TBili  1.6<H>  /  DBili  x   /  AST  492<H>  /  ALT  305<H>  /  AlkPhos  126<H>  11    Creatinine Trend: 1.49<--, 1.17<--, 0.93<--, 0.97<--, 1.14<--, 1.05<--  PT/INR - ( 10 Feb 2021 18:27 )   PT: 20.9 sec;   INR: 1.79 ratio         PTT - ( 2021 04:07 )  PTT:25.4 sec  Urinalysis Basic - ( 10 Feb 2021 18:10 )    Color: Light Yellow / Appearance: Clear / S.011 / pH: x  Gluc: x / Ketone: Negative  / Bili: Negative / Urobili: Negative   Blood: x / Protein: 30 mg/dL / Nitrite: Negative   Leuk Esterase: Negative / RBC: 3 /hpf / WBC 2 /HPF   Sq Epi: x / Non Sq Epi: 2 /hpf / Bacteria: Negative      Arterial Blood Gas:   @ 04:04  7.30/53/74/25/91/-1.3  ABG lactate: --  Arterial Blood Gas:   @ 01:17  7.32/50/82/25/94/-.7  ABG lactate: --  Arterial Blood Gas:  02-10 @ 23:41  7.34/46/105/24/97/-1.8  ABG lactate: --  Arterial Blood Gas:  02-10 @ 20:23  7.32/51/106/26/96/-.6  ABG lactate: --  Arterial Blood Gas:  02-10 @ 18:07  7.34/52/71/27/91/1.0  ABG lactate: --  Arterial Blood Gas:  02-10 @ 15:39  7.48/39/66/29/93/5.4  ABG lactate: --        MICROBIOLOGY:     Culture - Sputum (collected 21 @ 00:33)  Source: .Sputum sputum  Gram Stain (21 @ 06:04):    Rare polymorphonuclear leukocytes seen per low power field    Rare Squamous epithelial cells seen per low power field    Rare Gram positive cocci in pairs seen per oil power field        MEHDI aGrcia #[id number] HPI: 69 y/o F with PMH of HTN, HLD, uncontrolled T2DM ( HgbA1c 10.1 on 21) c/b neuropathy, Chronic back pain with Sciatica, and CVA in  resulting in slurred speech, difficulty walking, and right sided weakness using a cane at baseline and is A,Ox3. 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  Patient also admitted for sepsis 2/2 UTI and acute respiratory failure with hypoxia due to COVID-19 infection () requiring BiPAP. Patient intubated /10 s/p RRT for AMS and increased oxygenation requirements while on BiPAP, and transferred to ICU setting for ongoing management.      Interval Events: Nimbex discontinued and patient became desynchronous with the vent requiring Versed. Patient remained supine.       REVIEW OF SYSTEMS:  [X] Unable to assess ROS because intubated/sedated.    OBJECTIVE:  ICU Vital Signs Last 24 Hrs  T(C): 37.9 (2021 04:00), Max: 38.9 (10 Feb 2021 17:00)  T(F): 100.2 (2021 04:00), Max: 102 (10 Feb 2021 17:00)  HR: 106 (2021 06:00) (94 - 146)  BP: 155/98 (10 Feb 2021 11:32) (155/98 - 155/98)  ABP: 123/82 (2021 06:00) (63/51 - 146/91)  ABP(mean): 99 (2021 06:00) (56 - 112)  RR: 27 (2021 06:00) (20 - 28)  SpO2: 92% (2021 06:00) (82% - 98%)    Mode: AC/ CMV (Assist Control/ Continuous Mandatory Ventilation), RR (machine): 25, TV (machine): 360, FiO2: 70, PEEP: 12, ITime: 1, MAP: 17, PIP: 29    - @ 07:01  -  02-10 @ 07:00  --------------------------------------------------------  IN: 0 mL / OUT: 1401 mL / NET: -1401 mL    02-10 @ 07:01  11 @ 06:33  --------------------------------------------------------  IN: 1233.3 mL / OUT: 1105 mL / NET: 128.3 mL      CAPILLARY BLOOD GLUCOSE      POCT Blood Glucose.: 362 mg/dL (2021 01:35)      PHYSICAL EXAM:  General: intuabted on ventilator.   HEENT/Neck: supple and atraumatic.   Respiratory: On ventilator A/C 25/360/14/80% with Ppl 28 and Driving pressure 14.  POCUS: alines with predominant b lines bilaterally. Consolidation of the right lung, no pleural effusion noted.   Extremities/Skin: warm and dry. Left radial Renee and RIJ central line in place with clean and dry dressing in tact   Neurological: sedated RASS -4    LINES:  RIJ Central line 2/10   Left radial arterial line 2/10    HOSPITAL MEDICATIONS:  MEDICATIONS  (STANDING):  ALBUTerol    90 MICROgram(s) HFA Inhaler 1 Puff(s) Inhalation every 6 hours  apixaban 5 milliGRAM(s) Oral two times a day  artificial  tears Solution 1 Drop(s) Both EYES every 12 hours  ascorbic acid 500 milliGRAM(s) Oral daily  atorvastatin 80 milliGRAM(s) Oral at bedtime  chlorhexidine 0.12% Liquid 15 milliLiter(s) Oral Mucosa every 12 hours  chlorhexidine 4% Liquid 1 Application(s) Topical <User Schedule>  cholecalciferol 400 Unit(s) Oral daily  cisatracurium Infusion 3 MICROgram(s)/kG/Min (18 mL/Hr) IV Continuous <Continuous>  dextrose 40% Gel 15 Gram(s) Oral once  dextrose 5%. 1000 milliLiter(s) (50 mL/Hr) IV Continuous <Continuous>  dextrose 5%. 1000 milliLiter(s) (100 mL/Hr) IV Continuous <Continuous>  dextrose 5%. 1000 milliLiter(s) (100 mL/Hr) IV Continuous <Continuous>  dextrose 50% Injectable 25 Gram(s) IV Push once  fentaNYL   Infusion... 2 MICROgram(s)/kG/Hr (9.98 mL/Hr) IV Continuous <Continuous>  glucagon  Injectable 1 milliGRAM(s) IntraMuscular once  insulin glargine Injectable (LANTUS) 20 Unit(s) SubCutaneous at bedtime  insulin lispro (ADMELOG) corrective regimen sliding scale   SubCutaneous every 4 hours  multivitamin 1 Tablet(s) Oral daily  norepinephrine Infusion 0.05 MICROgram(s)/kG/Min (9.36 mL/Hr) IV Continuous <Continuous>  pantoprazole  Injectable 40 milliGRAM(s) IV Push daily  piperacillin/tazobactam IVPB.. 3.375 Gram(s) IV Intermittent every 8 hours  polyethylene glycol 3350 17 Gram(s) Oral daily  propofol Infusion 50 MICROgram(s)/kG/Min (29.9 mL/Hr) IV Continuous <Continuous>  sodium chloride 0.45%. 1000 milliLiter(s) (100 mL/Hr) IV Continuous <Continuous>  vasopressin Infusion 0.04 Unit(s)/Min (2.4 mL/Hr) IV Continuous <Continuous>    MEDICATIONS  (PRN):      LABS:                        10.9   19.55 )-----------( 161      ( 2021 04:07 )             35.3     Hgb Trend: 10.9<--, 12.4<--, 12.2<--, 12.7<--, 12.8<--  02    147<H>  |  108  |  33<H>  ----------------------------<  328<H>  4.0   |  25  |  1.49<H>    Ca    8.3<L>      2021 04:07  Phos  4.7       Mg     2.8     11    TPro  6.3  /  Alb  2.8<L>  /  TBili  1.6<H>  /  DBili  x   /  AST  492<H>  /  ALT  305<H>  /  AlkPhos  126<H>      Creatinine Trend: 1.49<--, 1.17<--, 0.93<--, 0.97<--, 1.14<--, 1.05<--  PT/INR - ( 10 Feb 2021 18:27 )   PT: 20.9 sec;   INR: 1.79 ratio         PTT - ( 2021 04:07 )  PTT:25.4 sec  Urinalysis Basic - ( 10 Feb 2021 18:10 )    Color: Light Yellow / Appearance: Clear / S.011 / pH: x  Gluc: x / Ketone: Negative  / Bili: Negative / Urobili: Negative   Blood: x / Protein: 30 mg/dL / Nitrite: Negative   Leuk Esterase: Negative / RBC: 3 /hpf / WBC 2 /HPF   Sq Epi: x / Non Sq Epi: 2 /hpf / Bacteria: Negative      Arterial Blood Gas:   @ 04:04  7.30/53/74/25/91/-1.3  ABG lactate: --  Arterial Blood Gas:   @ 01:17  7.32/50/82/25/94/-.7  ABG lactate: --  Arterial Blood Gas:  02-10 @ 23:41  7.34/46/105/24/97/-1.8  ABG lactate: --  Arterial Blood Gas:  02-10 @ 20:23  7.32/51/106/26/96/-.6  ABG lactate: --  Arterial Blood Gas:  02-10 @ 18:07  7.34/52/71/27/91/1.0  ABG lactate: --  Arterial Blood Gas:  02-10 @ 15:39  7.48/39/66/29/93/5.4  ABG lactate: --        MICROBIOLOGY:     Culture - Sputum (collected 21 @ 00:33)  Source: .Sputum sputum  Gram Stain (21 @ 06:04):    Rare polymorphonuclear leukocytes seen per low power field    Rare Squamous epithelial cells seen per low power field    Rare Gram positive cocci in pairs seen per oil power field        MEHDI Garcia #[id number]

## 2021-02-11 NOTE — PROGRESS NOTE ADULT - ATTENDING COMMENTS
known CVA with covid related embolic stroke, severe covidARDS, shock ? PE plus cytokine related in the setting of superimposed bacterial pna (GPC in pairs)_ or just hyperinflamm covid, possibly also hypovolemic(improved BP with fluids).  Hypernatremia - likely hypovolemic  Hypercoag - check teg, cont eliquis for now; dopplers of LE check  Lung protective ventilation    cc time 60 mns known CVA with covid related embolic stroke,   severe covidARDS, driving pressure 14, plateau 29 on peep 14 - relatively preserved compliance --> CTPA    shock ? PE plus cytokine related in the setting of superimposed bacterial pna (GPC in pairs)_ or just hyperinflamm covid, possibly also hypovolemic(improved BP with fluids).  Hypernatremia - likely hypovolemic  Hypercoag - check teg, cont eliquis for now; dopplers of LE check  Lung protective ventilation    cc time 60 mns

## 2021-02-11 NOTE — PROGRESS NOTE ADULT - PROBLEM SELECTOR PLAN 1
-test BG Q4 now  -Recommend start IV insulin gtt for standard of care to improve glycemic control w/acidosis/high dose pressors and to determine how much insulin pt requires while on pressors/tube feeds. Once glucose controlled can transition back to NPH  -NPH 6 units Q6h. If not using insulin gtt will require significant increase of dose. NPH 15 q6h  -Custom Amelog high scale (4 units for every 50mg/dl) Q4h started by MICU team  Would reduce this to moderate scale once BG values under 200mg/dl to prevent overcorrection  Discharge Plan:   - Patient may be able to be discharged on home regimen pending inpatient course and steroid taper. Doses TBD.  - Patient can f/u with Endocrine practice as follows  [address], [address]  [place] [address]  [phone]  -Needs Optho follow up

## 2021-02-11 NOTE — PROGRESS NOTE ADULT - ASSESSMENT
71 yo F w/h/o uncontrolled DM2 (HbA1c 10.1). DM c/b neuropathy, CVA'15. Also HTN, HLD, chronic low back pain and sciatica. Transferred from [facility] for basilar artery stenosis, while in hospital found to have UTI and COVID-19. On Dexa for COVID  tx. Endocrine Team consulted for inpatient T2DM management. Now intubated in MICU on high dose pressors. Glucose values elevated persistently in 300s. Started on NPH today by primary team. Discussed w/team this AM, recommend IV insulin infusion given critical illness and acidosis to optimize glycemic control. Pt w/severe insulin resistance. BG goal (140-180mg/dl).

## 2021-02-12 NOTE — PROGRESS NOTE ADULT - NUTRITIONAL ASSESSMENT
Please see RD assessment and/or follow up.  Managed by primary team as well

## 2021-02-12 NOTE — PROGRESS NOTE ADULT - SUBJECTIVE AND OBJECTIVE BOX
HPI:      Interval Events:      REVIEW OF SYSTEMS:  [X] Unable to assess ROS because intubated/sedated.    OBJECTIVE:  ICU Vital Signs Last 24 Hrs  T(C): 37.5 (2021 04:00), Max: 38.3 (2021 08:00)  T(F): 99.5 (2021 04:00), Max: 100.9 (2021 08:00)  HR: 131 (:00) (64 - 131)  BP: --  BP(mean): --  ABP: 108/67 (2021 04:00) (108/67 - 156/96)  ABP(mean): 84 (:) (84 - 121)  RR: 28 (:00) (25 - 29)  SpO2: 100% (:) (86% - 100%)    Mode: AC/ CMV (Assist Control/ Continuous Mandatory Ventilation), RR (machine): 28, TV (machine): 360, FiO2: 90, PEEP: 14, ITime: 0.75, MAP: 20, PIP: 29    02-10 @ 07:11 @ 07:00  --------------------------------------------------------  IN: 1333.3 mL / OUT: 1155 mL / NET: 178.3 mL     @ 07:12 @ 06:25  --------------------------------------------------------  IN: 4431.7 mL / OUT: 1305 mL / NET: 3126.7 mL      CAPILLARY BLOOD GLUCOSE      POCT Blood Glucose.: 119 mg/dL (2021 06:03)      PHYSICAL EXAM:  General: intuabted on ventilator.   HEENT/Neck: supple and atraumatic.   Respiratory: On ventilator.   Extremities/Skin: warm and dry.   Neurological: sedated RASS -4    LINES:    HOSPITAL MEDICATIONS:  MEDICATIONS  (STANDING):  ALBUTerol    90 MICROgram(s) HFA Inhaler 1 Puff(s) Inhalation every 6 hours  apixaban 5 milliGRAM(s) Oral two times a day  artificial  tears Solution 1 Drop(s) Both EYES every 12 hours  ascorbic acid 500 milliGRAM(s) Oral daily  atorvastatin 80 milliGRAM(s) Oral at bedtime  chlorhexidine 0.12% Liquid 15 milliLiter(s) Oral Mucosa every 12 hours  chlorhexidine 4% Liquid 1 Application(s) Topical <User Schedule>  cholecalciferol 400 Unit(s) Oral daily  cisatracurium Infusion 3 MICROgram(s)/kG/Min (18 mL/Hr) IV Continuous <Continuous>  dextrose 5%. 1000 milliLiter(s) (100 mL/Hr) IV Continuous <Continuous>  dextrose 5%. 1000 milliLiter(s) (100 mL/Hr) IV Continuous <Continuous>  dextrose 5%. 1000 milliLiter(s) (50 mL/Hr) IV Continuous <Continuous>  fentaNYL   Infusion... 2 MICROgram(s)/kG/Hr (9.98 mL/Hr) IV Continuous <Continuous>  glucagon  Injectable 1 milliGRAM(s) IntraMuscular once  insulin regular Infusion 5 Unit(s)/Hr (5 mL/Hr) IV Continuous <Continuous>  midazolam Infusion 0.02 mG/kG/Hr (2 mL/Hr) IV Continuous <Continuous>  multivitamin/minerals/iron Oral Solution (CENTRUM) 15 milliLiter(s) Oral daily  norepinephrine Infusion 0.05 MICROgram(s)/kG/Min (9.36 mL/Hr) IV Continuous <Continuous>  pantoprazole  Injectable 40 milliGRAM(s) IV Push daily  phenylephrine    Infusion 0.5 MICROgram(s)/kG/Min (18.7 mL/Hr) IV Continuous <Continuous>  piperacillin/tazobactam IVPB.. 3.375 Gram(s) IV Intermittent every 8 hours  polyethylene glycol 3350 17 Gram(s) Oral every 12 hours  senna Syrup 10 milliLiter(s) Oral at bedtime  sodium chloride 0.45%. 1000 milliLiter(s) (100 mL/Hr) IV Continuous <Continuous>  vasopressin Infusion 0.04 Unit(s)/Min (2.4 mL/Hr) IV Continuous <Continuous>    MEDICATIONS  (PRN):      LABS:                        9.4    18.21 )-----------( 75       ( 2021 00:38 )             30.3     Hgb Trend: 9.4<--, 9.7<--, 10.9<--, 12.4<--, 12.2<--  0212    146<H>  |  111<H>  |  39<H>  ----------------------------<  224<H>  4.2   |  23  |  1.47<H>    Ca    7.7<L>      2021 00:38  Phos  4.5       Mg     2.9     12    TPro  5.4<L>  /  Alb  2.4<L>  /  TBili  1.2  /  DBili  x   /  AST  239<H>  /  ALT  221<H>  /  AlkPhos  133<H>      Creatinine Trend: 1.47<--, 1.29<--, 1.49<--, 1.17<--, 0.93<--, 0.97<--  PT/INR - ( 2021 00:38 )   PT: 18.8 sec;   INR: 1.60 ratio         PTT - ( 2021 00:38 )  PTT:26.3 sec  Urinalysis Basic - ( 10 Feb 2021 18:10 )    Color: Light Yellow / Appearance: Clear / S.011 / pH: x  Gluc: x / Ketone: Negative  / Bili: Negative / Urobili: Negative   Blood: x / Protein: 30 mg/dL / Nitrite: Negative   Leuk Esterase: Negative / RBC: 3 /hpf / WBC 2 /HPF   Sq Epi: x / Non Sq Epi: 2 /hpf / Bacteria: Negative      Arterial Blood Gas:   @ 03:58  7.24/64/63/26/87/-1.6  ABG lactate: --  Arterial Blood Gas:   @ 00:26  7.24/62/84/26/94/-1.5  ABG lactate: --  Arterial Blood Gas:   @ 22:58  7.26/55/89/24/95/-3.0  ABG lactate: --  Arterial Blood Gas:   @ 21:26  7.23/61/53/24/80/-3.2  ABG lactate: --  Arterial Blood Gas:   @ 20:44  7.24/59/48/24/75/-2.8  ABG lactate: --  Arterial Blood Gas:   @ 09:53  7.30/56/78/27/93/.3  ABG lactate: --  Arterial Blood Gas:   @ 07:40  7.29/57/129/26/98/-.3  ABG lactate: --  Arterial Blood Gas:   @ 04:04  7.30/53/74/25/91/-1.3  ABG lactate: --  Arterial Blood Gas:   @ 01:17  7.32/50/82/25/94/-.7  ABG lactate: --  Arterial Blood Gas:  02-10 @ 23:41  7.34/46/105/24/97/-1.8  ABG lactate: --  Arterial Blood Gas:  02-10 @ 20:23  7.32/51/106/26/96/-.6  ABG lactate: --  Arterial Blood Gas:  02-10 @ 18:07  7.34/52/71/27/91/1.0  ABG lactate: --  Arterial Blood Gas:  02-10 @ 15:39  7.48/39/66/29/93/5.4  ABG lactate: --        MICROBIOLOGY:     Culture - Sputum (collected 21 @ 00:33)  Source: .Sputum sputum  Gram Stain (21 @ 06:04):    Rare polymorphonuclear leukocytes seen per low power field    Rare Squamous epithelial cells seen per low power field    Rare Gram positive cocci in pairs seen per oil power field  Preliminary Report (21 @ 19:23):    Normal Respiratory Elisabeth present    Culture - Urine (collected 02-10-21 @ 21:00)  Source: .Urine Catheterized  Final Report (21 @ 16:11):    No growth    Culture - Blood (collected 02-10-21 @ 20:49)  Source: .Blood Blood  Preliminary Report (21 @ 21:01):    No growth to date.    Culture - Blood (collected 02-10-21 @ 18:16)  Source: .Blood Blood-Peripheral  Preliminary Report (21 @ 19:01):    No growth to date.    Culture - Blood (collected 02-10-21 @ 18:16)  Source: .Blood Blood-Peripheral  Preliminary Report (21 @ 19:01):    No growth to date.        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  Patient also admitted for sepsis 2/2 UTI and acute respiratory failure with hypoxia due to COVID-19 infection () requiring BiPAP. Patient intubated 2/10 s/p RRT for AMS and increased oxygenation requirements while on BiPAP, and transferred to ICU setting for ongoing management.        Interval Events: Patient proned s/p CTA and needed to be supined after an hour based on ABG and Saturating 70%. Propofol discontinued due to elevated trigs and started on Versed. Patient remained tachy 120's, levo discontinued and started on emily.       REVIEW OF SYSTEMS:  [X] Unable to assess ROS because intubated/sedated.    OBJECTIVE:  ICU Vital Signs Last 24 Hrs  T(C): 37.5 (2021 04:00), Max: 38.3 (2021 08:00)  T(F): 99.5 (2021 04:00), Max: 100.9 (2021 08:00)  HR: 131 (2021 04:00) (64 - 131)  ABP: 108/67 (2021 04:00) (108/67 - 156/96)  ABP(mean): 84 (2021 04:00) (84 - 121)  RR: 28 (2021 04:00) (25 - 29)  SpO2: 100% (2021 04:00) (86% - 100%)    Mode: AC/ CMV (Assist Control/ Continuous Mandatory Ventilation), RR (machine): 28, TV (machine): 360, FiO2: 90, PEEP: 14, ITime: 0.75, MAP: 20, PIP: 29    02-10 @ 07:01  -  02-11 @ 07:00  --------------------------------------------------------  IN: 1333.3 mL / OUT: 1155 mL / NET: 178.3 mL     @ 07: @ 06:25  --------------------------------------------------------  IN: 4431.7 mL / OUT: 1305 mL / NET: 3126.7 mL      CAPILLARY BLOOD GLUCOSE      POCT Blood Glucose.: 119 mg/dL (2021 06:03)      PHYSICAL EXAM:  General: intuabted on ventilator.   HEENT/Neck: supple and atraumatic.   Respiratory: On ventilator.   Extremities/Skin: warm and dry.   Neurological: sedated RASS -4    LINES:RI Central line 2/10   Left radial arterial line 2/10    HOSPITAL MEDICATIONS:  MEDICATIONS  (STANDING):  ALBUTerol    90 MICROgram(s) HFA Inhaler 1 Puff(s) Inhalation every 6 hours  apixaban 5 milliGRAM(s) Oral two times a day  artificial  tears Solution 1 Drop(s) Both EYES every 12 hours  ascorbic acid 500 milliGRAM(s) Oral daily  atorvastatin 80 milliGRAM(s) Oral at bedtime  chlorhexidine 0.12% Liquid 15 milliLiter(s) Oral Mucosa every 12 hours  chlorhexidine 4% Liquid 1 Application(s) Topical <User Schedule>  cholecalciferol 400 Unit(s) Oral daily  cisatracurium Infusion 3 MICROgram(s)/kG/Min (18 mL/Hr) IV Continuous <Continuous>  dextrose 5%. 1000 milliLiter(s) (100 mL/Hr) IV Continuous <Continuous>  dextrose 5%. 1000 milliLiter(s) (100 mL/Hr) IV Continuous <Continuous>  dextrose 5%. 1000 milliLiter(s) (50 mL/Hr) IV Continuous <Continuous>  fentaNYL   Infusion... 2 MICROgram(s)/kG/Hr (9.98 mL/Hr) IV Continuous <Continuous>  glucagon  Injectable 1 milliGRAM(s) IntraMuscular once  insulin regular Infusion 5 Unit(s)/Hr (5 mL/Hr) IV Continuous <Continuous>  midazolam Infusion 0.02 mG/kG/Hr (2 mL/Hr) IV Continuous <Continuous>  multivitamin/minerals/iron Oral Solution (CENTRUM) 15 milliLiter(s) Oral daily  norepinephrine Infusion 0.05 MICROgram(s)/kG/Min (9.36 mL/Hr) IV Continuous <Continuous>  pantoprazole  Injectable 40 milliGRAM(s) IV Push daily  phenylephrine    Infusion 0.5 MICROgram(s)/kG/Min (18.7 mL/Hr) IV Continuous <Continuous>  piperacillin/tazobactam IVPB.. 3.375 Gram(s) IV Intermittent every 8 hours  polyethylene glycol 3350 17 Gram(s) Oral every 12 hours  senna Syrup 10 milliLiter(s) Oral at bedtime  sodium chloride 0.45%. 1000 milliLiter(s) (100 mL/Hr) IV Continuous <Continuous>  vasopressin Infusion 0.04 Unit(s)/Min (2.4 mL/Hr) IV Continuous <Continuous>    MEDICATIONS  (PRN):      LABS:                        9.4    18.21 )-----------( 75       ( 2021 00:38 )             30.3     Hgb Trend: 9.4<--, 9.7<--, 10.9<--, 12.4<--, 12.2<--  02    146<H>  |  111<H>  |  39<H>  ----------------------------<  224<H>  4.2   |  23  |  1.47<H>    Ca    7.7<L>      2021 00:38  Phos  4.5     -12  Mg     2.9     12    TPro  5.4<L>  /  Alb  2.4<L>  /  TBili  1.2  /  DBili  x   /  AST  239<H>  /  ALT  221<H>  /  AlkPhos  133<H>      Creatinine Trend: 1.47<--, 1.29<--, 1.49<--, 1.17<--, 0.93<--, 0.97<--  PT/INR - ( 2021 00:38 )   PT: 18.8 sec;   INR: 1.60 ratio         PTT - ( 2021 00:38 )  PTT:26.3 sec  Urinalysis Basic - ( 10 Feb 2021 18:10 )    Color: Light Yellow / Appearance: Clear / S.011 / pH: x  Gluc: x / Ketone: Negative  / Bili: Negative / Urobili: Negative   Blood: x / Protein: 30 mg/dL / Nitrite: Negative   Leuk Esterase: Negative / RBC: 3 /hpf / WBC 2 /HPF   Sq Epi: x / Non Sq Epi: 2 /hpf / Bacteria: Negative      Arterial Blood Gas:   @ 03:58  7.24/64/63/26/87/-1.6  ABG lactate: --  Arterial Blood Gas:   @ 00:26  7.24/62/84/26/94/-1.5  ABG lactate: --  Arterial Blood Gas:   @ 22:58  7.26/55/89/24/95/-3.0  ABG lactate: --  Arterial Blood Gas:   @ 21:26  7.23/61/53/24/80/-3.2  ABG lactate: --  Arterial Blood Gas:   @ 20:44  7.24/59/48/24/75/-2.8  ABG lactate: --  Arterial Blood Gas:   @ 09:53  7.30/56/78/27/93/.3  ABG lactate: --  Arterial Blood Gas:   @ 07:40  7.29/57/129/26/98/-.3  ABG lactate: --  Arterial Blood Gas:   @ 04:04  7.30/53/74/25/91/-1.3  ABG lactate: --  Arterial Blood Gas:   @ 01:17  7.32/50/82//94/-.7  ABG lactate: --  Arterial Blood Gas:  02-10 @ 23:41  7.34/46/105/24/97/-1.8  ABG lactate: --  Arterial Blood Gas:  02-10 @ 20:23  7.32/51/106/26/96/-.6  ABG lactate: --  Arterial Blood Gas:  02-10 @ 18:07  7.34/52/71/27/91/1.0  ABG lactate: --  Arterial Blood Gas:  02-10 @ 15:39  7.48/39/66/29/93/5.4  ABG lactate: --        MICROBIOLOGY:     Culture - Sputum (collected 21 @ 00:33)  Source: .Sputum sputum  Gram Stain (21 @ 06:04):    Rare polymorphonuclear leukocytes seen per low power field    Rare Squamous epithelial cells seen per low power field    Rare Gram positive cocci in pairs seen per oil power field  Preliminary Report (21 @ 19:23):    Normal Respiratory Elisabeth present    Culture - Urine (collected 02-10-21 @ 21:00)  Source: .Urine Catheterized  Final Report (21 @ 16:11):    No growth    Culture - Blood (collected 02-10-21 @ 20:49)  Source: .Blood Blood  Preliminary Report (21 @ 21:01):    No growth to date.    Culture - Blood (collected 02-10-21 @ 18:16)  Source: .Blood Blood-Peripheral  Preliminary Report (21 @ 19:01):    No growth to date.    Culture - Blood (collected 02-10-21 @ 18:16)  Source: .Blood Blood-Peripheral  Preliminary Report (21 @ 19:01):    No growth to date.        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  Patient also admitted for sepsis 2/2 UTI and acute respiratory failure with hypoxia due to COVID-19 infection () requiring BiPAP. Patient intubated 2/10 s/p RRT for AMS and increased oxygenation requirements while on BiPAP, and transferred to ICU setting for ongoing management.        Interval Events: Patient proned s/p CTA and needed to be supined after an hour based on ABG and Saturating 70%. Propofol discontinued due to elevated trigs and started on Versed. Patient remained tachy 120's, levo discontinued and started on emily.       REVIEW OF SYSTEMS:  [X] Unable to assess ROS because intubated/sedated.    OBJECTIVE:  ICU Vital Signs Last 24 Hrs  T(C): 37.5 (2021 04:00), Max: 38.3 (2021 08:00)  T(F): 99.5 (2021 04:00), Max: 100.9 (2021 08:00)  HR: 131 (2021 04:00) (64 - 131)  ABP: 108/67 (2021 04:00) (108/67 - 156/96)  ABP(mean): 84 (2021 04:00) (84 - 121)  RR: 28 (2021 04:00) (25 - 29)  SpO2: 100% (2021 04:00) (86% - 100%)    Mode: AC/ CMV (Assist Control/ Continuous Mandatory Ventilation), RR (machine): 28, TV (machine): 360, FiO2: 90, PEEP: 14, ITime: 0.75, MAP: 20, PIP: 29    02-10 @ 07:01  -  02-11 @ 07:00  --------------------------------------------------------  IN: 1333.3 mL / OUT: 1155 mL / NET: 178.3 mL     @ 07: @ 06:25  --------------------------------------------------------  IN: 4431.7 mL / OUT: 1305 mL / NET: 3126.7 mL      CAPILLARY BLOOD GLUCOSE      POCT Blood Glucose.: 119 mg/dL (2021 06:03)      PHYSICAL EXAM:  General: intuabted on ventilator.   HEENT/Neck: supple and atraumatic.   Respiratory: On ventilator.   Extremities/Skin: warm and dry.   Neurological: sedated RASS -4    LINES:RI Central line 2/10   Left radial arterial line 2/10    HOSPITAL MEDICATIONS:  MEDICATIONS  (STANDING):  ALBUTerol    90 MICROgram(s) HFA Inhaler 1 Puff(s) Inhalation every 6 hours  apixaban 5 milliGRAM(s) Oral two times a day  artificial  tears Solution 1 Drop(s) Both EYES every 12 hours  ascorbic acid 500 milliGRAM(s) Oral daily  atorvastatin 80 milliGRAM(s) Oral at bedtime  chlorhexidine 0.12% Liquid 15 milliLiter(s) Oral Mucosa every 12 hours  chlorhexidine 4% Liquid 1 Application(s) Topical <User Schedule>  cholecalciferol 400 Unit(s) Oral daily  cisatracurium Infusion 3 MICROgram(s)/kG/Min (18 mL/Hr) IV Continuous <Continuous>  dextrose 5%. 1000 milliLiter(s) (100 mL/Hr) IV Continuous <Continuous>  dextrose 5%. 1000 milliLiter(s) (100 mL/Hr) IV Continuous <Continuous>  dextrose 5%. 1000 milliLiter(s) (50 mL/Hr) IV Continuous <Continuous>  fentaNYL   Infusion... 2 MICROgram(s)/kG/Hr (9.98 mL/Hr) IV Continuous <Continuous>  glucagon  Injectable 1 milliGRAM(s) IntraMuscular once  insulin regular Infusion 5 Unit(s)/Hr (5 mL/Hr) IV Continuous <Continuous>  midazolam Infusion 0.02 mG/kG/Hr (2 mL/Hr) IV Continuous <Continuous>  multivitamin/minerals/iron Oral Solution (CENTRUM) 15 milliLiter(s) Oral daily  norepinephrine Infusion 0.05 MICROgram(s)/kG/Min (9.36 mL/Hr) IV Continuous <Continuous>  pantoprazole  Injectable 40 milliGRAM(s) IV Push daily  phenylephrine    Infusion 0.5 MICROgram(s)/kG/Min (18.7 mL/Hr) IV Continuous <Continuous>  piperacillin/tazobactam IVPB.. 3.375 Gram(s) IV Intermittent every 8 hours  polyethylene glycol 3350 17 Gram(s) Oral every 12 hours  senna Syrup 10 milliLiter(s) Oral at bedtime  sodium chloride 0.45%. 1000 milliLiter(s) (100 mL/Hr) IV Continuous <Continuous>  vasopressin Infusion 0.04 Unit(s)/Min (2.4 mL/Hr) IV Continuous <Continuous>    MEDICATIONS  (PRN):      LABS:                        9.4    18.21 )-----------( 75       ( 2021 00:38 )             30.3     Hgb Trend: 9.4<--, 9.7<--, 10.9<--, 12.4<--, 12.2<--  02    146<H>  |  111<H>  |  39<H>  ----------------------------<  224<H>  4.2   |  23  |  1.47<H>    Ca    7.7<L>      2021 00:38  Phos  4.5     -12  Mg     2.9     12    TPro  5.4<L>  /  Alb  2.4<L>  /  TBili  1.2  /  DBili  x   /  AST  239<H>  /  ALT  221<H>  /  AlkPhos  133<H>      Creatinine Trend: 1.47<--, 1.29<--, 1.49<--, 1.17<--, 0.93<--, 0.97<--  PT/INR - ( 2021 00:38 )   PT: 18.8 sec;   INR: 1.60 ratio         PTT - ( 2021 00:38 )  PTT:26.3 sec  Urinalysis Basic - ( 10 Feb 2021 18:10 )    Color: Light Yellow / Appearance: Clear / S.011 / pH: x  Gluc: x / Ketone: Negative  / Bili: Negative / Urobili: Negative   Blood: x / Protein: 30 mg/dL / Nitrite: Negative   Leuk Esterase: Negative / RBC: 3 /hpf / WBC 2 /HPF   Sq Epi: x / Non Sq Epi: 2 /hpf / Bacteria: Negative      Arterial Blood Gas:   @ 03:58  7.24/64/63/26/87/-1.6  ABG lactate: --  Arterial Blood Gas:   @ 00:26  7.24/62/84/26/94/-1.5  ABG lactate: --  Arterial Blood Gas:   @ 22:58  7.26/55/89/24/95/-3.0  ABG lactate: --  Arterial Blood Gas:   @ 21:26  7.23/61/53/24/80/-3.2  ABG lactate: --  Arterial Blood Gas:   @ 20:44  7.24/59/48/24/75/-2.8  ABG lactate: --  Arterial Blood Gas:   @ 09:53  7.30/56/78/27/93/.3  ABG lactate: --  Arterial Blood Gas:   @ 07:40  7.29/57/129/26/98/-.3  ABG lactate: --  Arterial Blood Gas:   @ 04:04  7.30/53/74/25/91/-1.3  ABG lactate: --  Arterial Blood Gas:   @ 01:17  7.32/50/82//94/-.7  ABG lactate: --  Arterial Blood Gas:  02-10 @ 23:41  7.34/46/105/24/97/-1.8  ABG lactate: --  Arterial Blood Gas:  02-10 @ 20:23  7.32/51/106/26/96/-.6  ABG lactate: --  Arterial Blood Gas:  02-10 @ 18:07  7.34/52/71/27/91/1.0  ABG lactate: --  Arterial Blood Gas:  02-10 @ 15:39  7.48/39/66/29/93/5.4  ABG lactate: --        MICROBIOLOGY:     Culture - Sputum (collected 21 @ 00:33)  Source: .Sputum sputum  Gram Stain (21 @ 06:04):    Rare polymorphonuclear leukocytes seen per low power field    Rare Squamous epithelial cells seen per low power field    Rare Gram positive cocci in pairs seen per oil power field  Preliminary Report (21 @ 19:23):    Normal Respiratory Elisabeth present    Culture - Urine (collected 02-10-21 @ 21:00)  Source: .Urine Catheterized  Final Report (21 @ 16:11):    No growth    Culture - Blood (collected 02-10-21 @ 20:49)  Source: .Blood Blood  Preliminary Report (21 @ 21:01):    No growth to date.    Culture - Blood (collected 02-10-21 @ 18:16)  Source: .Blood Blood-Peripheral  Preliminary Report (21 @ 19:01):    No growth to date.    Culture - Blood (collected 02-10-21 @ 18:16)  Source: .Blood Blood-Peripheral  Preliminary Report (21 @ 19:01):    No growth to date.        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  Patient also admitted for sepsis 2/2 UTI and acute respiratory failure with hypoxia due to COVID-19 infection () requiring BiPAP. Patient intubated 2/10 s/p RRT for AMS and increased oxygenation requirements while on BiPAP, and transferred to ICU setting for ongoing management.        Interval Events: Patient proned s/p CTA and needed to be supined after an hour based on ABG and Saturating 70%. Propofol discontinued due to elevated trigs and started on Versed. Patient remained tachy 120's, levo discontinued and started on emliy.       REVIEW OF SYSTEMS:  [X] Unable to assess ROS because intubated/sedated.    OBJECTIVE:  ICU Vital Signs Last 24 Hrs  T(C): 37.5 (2021 04:00), Max: 38.3 (2021 08:00)  T(F): 99.5 (2021 04:00), Max: 100.9 (2021 08:00)  HR: 131 (2021 04:00) (64 - 131)  ABP: 108/67 (2021 04:00) (108/67 - 156/96)  ABP(mean): 84 (2021 04:00) (84 - 121)  RR: 28 (2021 04:00) (25 - 29)  SpO2: 100% (2021 04:00) (86% - 100%)    Mode: AC/ CMV (Assist Control/ Continuous Mandatory Ventilation), RR (machine): 28, TV (machine): 360, FiO2: 90, PEEP: 14, ITime: 0.75, MAP: 20, PIP: 29    02-10 @ 07:01  -  02-11 @ 07:00  --------------------------------------------------------  IN: 1333.3 mL / OUT: 1155 mL / NET: 178.3 mL     @ 07: @ 06:25  --------------------------------------------------------  IN: 4431.7 mL / OUT: 1305 mL / NET: 3126.7 mL      CAPILLARY BLOOD GLUCOSE      POCT Blood Glucose.: 119 mg/dL (2021 06:03)      PHYSICAL EXAM:  General: intubated on ventilator.   HEENT/Neck: supple and atraumatic.   Respiratory: On ventilator A/C 25/360/14/80% with Ppl 28 and Driving pressure 14.  POCUS: alines with predominant b lines bilaterally. Consolidation of the right lung, no pleural effusion noted.   Extremities/Skin: warm and dry. Left radial [place] and RIJ central line in place with clean and dry dressing in tact   Neurological: sedated RASS -4    LINES:RIJ Central line 2/10   Left radial arterial line 2/10    HOSPITAL MEDICATIONS:  MEDICATIONS  (STANDING):  ALBUTerol    90 MICROgram(s) HFA Inhaler 1 Puff(s) Inhalation every 6 hours  apixaban 5 milliGRAM(s) Oral two times a day  artificial  tears Solution 1 Drop(s) Both EYES every 12 hours  ascorbic acid 500 milliGRAM(s) Oral daily  atorvastatin 80 milliGRAM(s) Oral at bedtime  chlorhexidine 0.12% Liquid 15 milliLiter(s) Oral Mucosa every 12 hours  chlorhexidine 4% Liquid 1 Application(s) Topical <User Schedule>  cholecalciferol 400 Unit(s) Oral daily  cisatracurium Infusion 3 MICROgram(s)/kG/Min (18 mL/Hr) IV Continuous <Continuous>  dextrose 5%. 1000 milliLiter(s) (100 mL/Hr) IV Continuous <Continuous>  dextrose 5%. 1000 milliLiter(s) (100 mL/Hr) IV Continuous <Continuous>  dextrose 5%. 1000 milliLiter(s) (50 mL/Hr) IV Continuous <Continuous>  fentaNYL   Infusion... 2 MICROgram(s)/kG/Hr (9.98 mL/Hr) IV Continuous <Continuous>  glucagon  Injectable 1 milliGRAM(s) IntraMuscular once  insulin regular Infusion 5 Unit(s)/Hr (5 mL/Hr) IV Continuous <Continuous>  midazolam Infusion 0.02 mG/kG/Hr (2 mL/Hr) IV Continuous <Continuous>  multivitamin/minerals/iron Oral Solution (CENTRUM) 15 milliLiter(s) Oral daily  norepinephrine Infusion 0.05 MICROgram(s)/kG/Min (9.36 mL/Hr) IV Continuous <Continuous>  pantoprazole  Injectable 40 milliGRAM(s) IV Push daily  phenylephrine    Infusion 0.5 MICROgram(s)/kG/Min (18.7 mL/Hr) IV Continuous <Continuous>  piperacillin/tazobactam IVPB.. 3.375 Gram(s) IV Intermittent every 8 hours  polyethylene glycol 3350 17 Gram(s) Oral every 12 hours  senna Syrup 10 milliLiter(s) Oral at bedtime  sodium chloride 0.45%. 1000 milliLiter(s) (100 mL/Hr) IV Continuous <Continuous>  vasopressin Infusion 0.04 Unit(s)/Min (2.4 mL/Hr) IV Continuous <Continuous>    MEDICATIONS  (PRN):      LABS:                        9.4    18.21 )-----------( 75       ( 2021 00:38 )             30.3     Hgb Trend: 9.4<--, 9.7<--, 10.9<--, 12.4<--, 12.2<--  02-12    146<H>  |  111<H>  |  39<H>  ----------------------------<  224<H>  4.2   |  23  |  1.47<H>    Ca    7.7<L>      2021 00:38  Phos  4.5     02-12  Mg     2.9     02-12    TPro  5.4<L>  /  Alb  2.4<L>  /  TBili  1.2  /  DBili  x   /  AST  239<H>  /  ALT  221<H>  /  AlkPhos  133<H>      Creatinine Trend: 1.47<--, 1.29<--, 1.49<--, 1.17<--, 0.93<--, 0.97<--  PT/INR - ( 2021 00:38 )   PT: 18.8 sec;   INR: 1.60 ratio         PTT - ( 2021 00:38 )  PTT:26.3 sec  Urinalysis Basic - ( 10 Feb 2021 18:10 )    Color: Light Yellow / Appearance: Clear / S.011 / pH: x  Gluc: x / Ketone: Negative  / Bili: Negative / Urobili: Negative   Blood: x / Protein: 30 mg/dL / Nitrite: Negative   Leuk Esterase: Negative / RBC: 3 /hpf / WBC 2 /HPF   Sq Epi: x / Non Sq Epi: 2 /hpf / Bacteria: Negative      Arterial Blood Gas:   @ 03:58  7.24/64/63/26/87/-1.6  ABG lactate: --  Arterial Blood Gas:   @ 00:26  7.24/62/84//94/-1.5  ABG lactate: --  Arterial Blood Gas:   @ 22:58  7.26/55/89/24/95/-3.0  ABG lactate: --  Arterial Blood Gas:   @ 21:26  7.23/61/53/24/80/-3.2  ABG lactate: --  Arterial Blood Gas:   @ 20:44  7.24/59/48/24/75/-2.8  ABG lactate: --  Arterial Blood Gas:   @ 09:53  7.30/56/78/27/93/.3  ABG lactate: --  Arterial Blood Gas:   @ 07:40  7.29/57/129/26/98/-.3  ABG lactate: --  Arterial Blood Gas:   @ 04:04  7.30/53/74/25/91/-1.3  ABG lactate: --  Arterial Blood Gas:   @ 01:17  7.32/50/82/25/94/-.7  ABG lactate: --  Arterial Blood Gas:  02-10 @ 23:41  7.34/46/105/24/97/-1.8  ABG lactate: --  Arterial Blood Gas:  02-10 @ 20:23  7.32/51/106/26/96/-.6  ABG lactate: --  Arterial Blood Gas:  02-10 @ 18:07  7.34/52/71/27/91/1.0  ABG lactate: --  Arterial Blood Gas:  02-10 @ 15:39  7.48/39/66/29/93/5.4  ABG lactate: --        MICROBIOLOGY:     Culture - Sputum (collected 21 @ 00:33)  Source: .Sputum sputum  Gram Stain (21 @ 06:04):    Rare polymorphonuclear leukocytes seen per low power field    Rare Squamous epithelial cells seen per low power field    Rare Gram positive cocci in pairs seen per oil power field  Preliminary Report (21 @ 19:23):    Normal Respiratory Elisabeth present    Culture - Urine (collected 02-10-21 @ 21:00)  Source: .Urine Catheterized  Final Report (21 @ 16:11):    No growth    Culture - Blood (collected 02-10-21 @ 20:49)  Source: .Blood Blood  Preliminary Report (21 @ 21:01):    No growth to date.    Culture - Blood (collected 02-10-21 @ 18:16)  Source: .Blood Blood-Peripheral  Preliminary Report (21 @ 19:01):    No growth to date.    Culture - Blood (collected 02-10-21 @ 18:16)  Source: .Blood Blood-Peripheral  Preliminary Report (21 @ 19:01):    No growth to date.        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  Patient also admitted for sepsis 2/2 UTI and acute respiratory failure with hypoxia due to COVID-19 infection () requiring BiPAP. Patient intubated 2/10 s/p RRT for AMS and increased oxygenation requirements while on BiPAP, and transferred to ICU setting for ongoing management.        Interval Events: Patient proned s/p CTA and needed to be supined after an hour based on ABG and Saturating at 70%. Propofol discontinued due to elevated trigs and started on Versed. Patient remained tachy 120's, levo discontinued and started on emily.       REVIEW OF SYSTEMS:  [X] Unable to assess ROS because intubated/sedated.    OBJECTIVE:  ICU Vital Signs Last 24 Hrs  T(C): 37.5 (2021 04:00), Max: 38.3 (2021 08:00)  T(F): 99.5 (2021 04:00), Max: 100.9 (2021 08:00)  HR: 131 (2021 04:00) (64 - 131)  ABP: 108/67 (2021 04:00) (108/67 - 156/96)  ABP(mean): 84 (2021 04:00) (84 - 121)  RR: 28 (2021 04:00) (25 - 29)  SpO2: 100% (2021 04:00) (86% - 100%)    Mode: AC/ CMV (Assist Control/ Continuous Mandatory Ventilation), RR (machine): 28, TV (machine): 360, FiO2: 90, PEEP: 14, ITime: 0.75, MAP: 20, PIP: 29    02-10 @ 07:01  -  02-11 @ 07:00  --------------------------------------------------------  IN: 1333.3 mL / OUT: 1155 mL / NET: 178.3 mL     @ 07: @ 06:25  --------------------------------------------------------  IN: 4431.7 mL / OUT: 1305 mL / NET: 3126.7 mL      CAPILLARY BLOOD GLUCOSE      POCT Blood Glucose.: 119 mg/dL (2021 06:03)      PHYSICAL EXAM:  General: intubated on ventilator.   HEENT/Neck: supple and atraumatic.   Respiratory: On ventilator A/C 25/360/14/80% with Ppl 28 and Driving pressure 14.  POCUS: alines with predominant b lines bilaterally. Consolidation of the right lung, no pleural effusion noted.   Extremities/Skin: warm and dry. Left radial Renee and RIJ central line in place with clean and dry dressing in tact   Neurological: sedated RASS -4    LINES:RIJ Central line 2/10   Left radial arterial line 2/10    HOSPITAL MEDICATIONS:  MEDICATIONS  (STANDING):  ALBUTerol    90 MICROgram(s) HFA Inhaler 1 Puff(s) Inhalation every 6 hours  apixaban 5 milliGRAM(s) Oral two times a day  artificial  tears Solution 1 Drop(s) Both EYES every 12 hours  ascorbic acid 500 milliGRAM(s) Oral daily  atorvastatin 80 milliGRAM(s) Oral at bedtime  chlorhexidine 0.12% Liquid 15 milliLiter(s) Oral Mucosa every 12 hours  chlorhexidine 4% Liquid 1 Application(s) Topical <User Schedule>  cholecalciferol 400 Unit(s) Oral daily  cisatracurium Infusion 3 MICROgram(s)/kG/Min (18 mL/Hr) IV Continuous <Continuous>  dextrose 5%. 1000 milliLiter(s) (100 mL/Hr) IV Continuous <Continuous>  dextrose 5%. 1000 milliLiter(s) (100 mL/Hr) IV Continuous <Continuous>  dextrose 5%. 1000 milliLiter(s) (50 mL/Hr) IV Continuous <Continuous>  fentaNYL   Infusion... 2 MICROgram(s)/kG/Hr (9.98 mL/Hr) IV Continuous <Continuous>  glucagon  Injectable 1 milliGRAM(s) IntraMuscular once  insulin regular Infusion 5 Unit(s)/Hr (5 mL/Hr) IV Continuous <Continuous>  midazolam Infusion 0.02 mG/kG/Hr (2 mL/Hr) IV Continuous <Continuous>  multivitamin/minerals/iron Oral Solution (CENTRUM) 15 milliLiter(s) Oral daily  norepinephrine Infusion 0.05 MICROgram(s)/kG/Min (9.36 mL/Hr) IV Continuous <Continuous>  pantoprazole  Injectable 40 milliGRAM(s) IV Push daily  phenylephrine    Infusion 0.5 MICROgram(s)/kG/Min (18.7 mL/Hr) IV Continuous <Continuous>  piperacillin/tazobactam IVPB.. 3.375 Gram(s) IV Intermittent every 8 hours  polyethylene glycol 3350 17 Gram(s) Oral every 12 hours  senna Syrup 10 milliLiter(s) Oral at bedtime  sodium chloride 0.45%. 1000 milliLiter(s) (100 mL/Hr) IV Continuous <Continuous>  vasopressin Infusion 0.04 Unit(s)/Min (2.4 mL/Hr) IV Continuous <Continuous>    MEDICATIONS  (PRN):      LABS:                        9.4    18.21 )-----------( 75       ( 2021 00:38 )             30.3     Hgb Trend: 9.4<--, 9.7<--, 10.9<--, 12.4<--, 12.2<--  02-12    146<H>  |  111<H>  |  39<H>  ----------------------------<  224<H>  4.2   |  23  |  1.47<H>    Ca    7.7<L>      2021 00:38  Phos  4.5     02-12  Mg     2.9     02-12    TPro  5.4<L>  /  Alb  2.4<L>  /  TBili  1.2  /  DBili  x   /  AST  239<H>  /  ALT  221<H>  /  AlkPhos  133<H>      Creatinine Trend: 1.47<--, 1.29<--, 1.49<--, 1.17<--, 0.93<--, 0.97<--  PT/INR - ( 2021 00:38 )   PT: 18.8 sec;   INR: 1.60 ratio         PTT - ( 2021 00:38 )  PTT:26.3 sec  Urinalysis Basic - ( 10 Feb 2021 18:10 )    Color: Light Yellow / Appearance: Clear / S.011 / pH: x  Gluc: x / Ketone: Negative  / Bili: Negative / Urobili: Negative   Blood: x / Protein: 30 mg/dL / Nitrite: Negative   Leuk Esterase: Negative / RBC: 3 /hpf / WBC 2 /HPF   Sq Epi: x / Non Sq Epi: 2 /hpf / Bacteria: Negative      Arterial Blood Gas:   @ 03:58  7.24/64/63/26/87/-1.6  ABG lactate: --  Arterial Blood Gas:   @ 00:26  7.24/62/84/26/94/-1.5  ABG lactate: --  Arterial Blood Gas:   @ 22:58  7.26/55/89/24/95/-3.0  ABG lactate: --  Arterial Blood Gas:   @ 21:26  7.23/61/53/24/80/-3.2  ABG lactate: --  Arterial Blood Gas:   @ 20:44  7.24/59/48/24/75/-2.8  ABG lactate: --  Arterial Blood Gas:   @ 09:53  7.30/56/78/27/93/.3  ABG lactate: --  Arterial Blood Gas:   @ 07:40  7.29/57/129/26/98/-.3  ABG lactate: --  Arterial Blood Gas:   @ 04:04  7.30/53/74/25/91/-1.3  ABG lactate: --  Arterial Blood Gas:   @ 01:17  7.32/50/82/25/94/-.7  ABG lactate: --  Arterial Blood Gas:  02-10 @ 23:41  7.34/46/105/24/97/-1.8  ABG lactate: --  Arterial Blood Gas:  02-10 @ 20:23  7.32/51/106/26/96/-.6  ABG lactate: --  Arterial Blood Gas:  02-10 @ 18:07  7.34/52/71/27/91/1.0  ABG lactate: --  Arterial Blood Gas:  02-10 @ 15:39  7.48/39/66/29/93/5.4  ABG lactate: --        MICROBIOLOGY:     Culture - Sputum (collected 21 @ 00:33)  Source: .Sputum sputum  Gram Stain (21 @ 06:04):    Rare polymorphonuclear leukocytes seen per low power field    Rare Squamous epithelial cells seen per low power field    Rare Gram positive cocci in pairs seen per oil power field  Preliminary Report (21 @ 19:23):    Normal Respiratory Elisabeth present    Culture - Urine (collected 02-10-21 @ 21:00)  Source: .Urine Catheterized  Final Report (21 @ 16:11):    No growth    Culture - Blood (collected 02-10-21 @ 20:49)  Source: .Blood Blood  Preliminary Report (21 @ 21:01):    No growth to date.    Culture - Blood (collected 02-10-21 @ 18:16)  Source: .Blood Blood-Peripheral  Preliminary Report (21 @ 19:01):    No growth to date.    Culture - Blood (collected 02-10-21 @ 18:16)  Source: .Blood Blood-Peripheral  Preliminary Report (21 @ 19:01):    No growth to date.        MEHDI Garcia #[id number]

## 2021-02-12 NOTE — PROGRESS NOTE ADULT - SUBJECTIVE AND OBJECTIVE BOX
DIABETES FOLLOW UP NOTE: Saw pt earlier today  INTERVAL HX: 69 yo F w/h/o uncontrolled DM2 (HbA1c 10.1). DM c/b neuropathy, CVA'15. Also HTN, HLD, chronic low back pain and sciatica. Transferred from [facility] for basilar artery stenosis, while in hospital found to have UTI and COVID-19. On Dexa for COVID  tx. Endocrine Team consulted for inpatient T2DM management. Now intubated/sedated in MICU> off steroids and on high dose pressors and TFs of vital at 10cc/hr. Glucose values mostly at goal while on insulin drip requiring 3-5 units/hr. No hypoglycemia.     Review of Systems:  General: As above. Unable.     Allergies    No Known Allergies    Intolerances      MEDICATIONS:  atorvastatin 80 milliGRAM(s) Oral at bedtime  cholecalciferol 400 Unit(s) Oral daily  insulin regular Infusion 5 Unit(s)/Hr (5 mL/Hr) IV Continuous <Continuous>  phenylephrine    Infusion 0.4 MICROgram(s)/kG/Min (7.49 mL/Hr) IV Continuous <Continuous>  piperacillin/tazobactam IVPB.. 3.375 Gram(s) IV Intermittent every 8 hours  vasopressin Infusion 0.04 Unit(s)/Min (2.4 mL/Hr) IV Continuous <Continuous>      PHYSICAL EXAM:  VITALS: T(C): 37.2 (02-12-21 @ 12:00)  T(F): 99 (02-12-21 @ 12:00), Max: 101.3 (02-12-21 @ 08:00)  HR: 0 (02-12-21 @ 15:27) (0 - 131)  BP: --  RR:  (25 - 34)  SpO2:  (84% - 100%)  Wt(kg): --  GENERAL: Female laying in bed in NAD  Abdomen: Soft, nontender, non distended, obese  Extremities: Warm, trace edema in all 4 exts  NEURO: Sedated    LABS:  POCT Blood Glucose.: 173 mg/dL (02-12-21 @ 14:17)  POCT Blood Glucose.: 169 mg/dL (02-12-21 @ 12:52)  POCT Blood Glucose.: 175 mg/dL (02-12-21 @ 11:54)  POCT Blood Glucose.: 183 mg/dL (02-12-21 @ 10:56)  POCT Blood Glucose.: 184 mg/dL (02-12-21 @ 09:58)  POCT Blood Glucose.: 147 mg/dL (02-12-21 @ 09:09)  POCT Blood Glucose.: 162 mg/dL (02-12-21 @ 08:14)  POCT Blood Glucose.: 138 mg/dL (02-12-21 @ 07:00)  POCT Blood Glucose.: 119 mg/dL (02-12-21 @ 06:03)  POCT Blood Glucose.: 139 mg/dL (02-12-21 @ 05:04)  POCT Blood Glucose.: 148 mg/dL (02-12-21 @ 04:01)  POCT Blood Glucose.: 175 mg/dL (02-12-21 @ 03:02)  POCT Blood Glucose.: 194 mg/dL (02-12-21 @ 02:06)  POCT Blood Glucose.: 198 mg/dL (02-12-21 @ 01:03)  POCT Blood Glucose.: 224 mg/dL (02-12-21 @ 00:01)  POCT Blood Glucose.: 232 mg/dL (02-11-21 @ 22:59)  POCT Blood Glucose.: 270 mg/dL (02-11-21 @ 22:03)  POCT Blood Glucose.: 306 mg/dL (02-11-21 @ 21:05)  POCT Blood Glucose.: 329 mg/dL (02-11-21 @ 20:09)  POCT Blood Glucose.: 381 mg/dL (02-11-21 @ 17:11)  POCT Blood Glucose.: 316 mg/dL (02-11-21 @ 13:05)  POCT Blood Glucose.: 350 mg/dL (02-11-21 @ 10:01)  POCT Blood Glucose.: 362 mg/dL (02-11-21 @ 01:35)  POCT Blood Glucose.: 260 mg/dL (02-10-21 @ 15:23)  POCT Blood Glucose.: 254 mg/dL (02-10-21 @ 12:16)  POCT Blood Glucose.: 283 mg/dL (02-10-21 @ 05:32)  POCT Blood Glucose.: 339 mg/dL (02-09-21 @ 23:09)  POCT Blood Glucose.: 345 mg/dL (02-09-21 @ 18:12)                            9.0    25.05 )-----------( 64       ( 12 Feb 2021 08:55 )             29.3       02-12    146<H>  |  111<H>  |  39<H>  ----------------------------<  224<H>  4.2   |  23  |  1.47<H>    EGFR if : 41<L>      Ca    7.7<L>      02-12  Mg     2.9     02-12  Phos  4.5     02-12    TPro  5.4<L>  /  Alb  2.4<L>  /  TBili  1.2  /  DBili  x   /  AST  239<H>  /  ALT  221<H>  /  AlkPhos  133<H>  02-12      A1C with Estimated Average Glucose Result: 10.1 % (01-31-21 @ 09:13)      Estimated Average Glucose: 243 mg/dL (01-31-21 @ 09:13)        02-12 Chol -- LDL -- HDL -- Trig 534<H>, 02-11 Chol -- LDL -- HDL -- Trig 422<H>, 01-31 Chol 227<H> LDL -- HDL 44<L> Trig 164<H>               DIABETES FOLLOW UP NOTE: Saw pt earlier today  INTERVAL HX: 71 yo F w/h/o uncontrolled DM2 (HbA1c 10.1). DM c/b neuropathy, CVA'15. Also HTN, HLD, chronic low back pain and sciatica. Transferred from [facility] for basilar artery stenosis, while in hospital found to have UTI and COVID-19. On Dexa for COVID  tx. Endocrine Team consulted for inpatient T2DM management. Now intubated/sedated in MICU> off steroids and on high dose pressors and TFs of vital at 10cc/hr. Glucose values mostly at goal while on insulin drip requiring 3-5 units/hr. No hypoglycemia. Noted pt went into Vtach this pm and .

## 2021-02-12 NOTE — DISCHARGE NOTE FOR THE EXPIRED PATIENT - HOSPITAL COURSE
71 y/o F with PMH of HTN, HLD, uncontrolled T2DM ( HgbA1c 10.1 on 1/31/21) c/b neuropathy, Chronic back pain with Sciatica, and CVA in 2015 resulting in slurred speech, difficulty walking, and right sided weakness using a cane at baseline and is A,Ox3. 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  Patient also admitted for sepsis 2/2 UTI and acute respiratory failure with hypoxia due to COVID-19 infection (1/30) requiring BiPAP. Patient intubated 2/10 s/p RRT for AMS and increased oxygenation requirements while on BiPAP, and transferred to ICU setting for ongoing management.  Patient required increased oxygen support with increased pressor requirements s/p patient went into VTach and subsequently into PEA arrest requiring ACLS protocol. Patient pronounced at 15:27 on 2/12/2021.

## 2021-02-12 NOTE — PROGRESS NOTE ADULT - PROBLEM SELECTOR PROBLEM 2
Essential hypertension
Hyperlipidemia, unspecified hyperlipidemia type
Essential hypertension
Hyperlipidemia, unspecified hyperlipidemia type
Essential hypertension
Essential hypertension
Hyperlipidemia, unspecified hyperlipidemia type
Essential hypertension
Encephalopathy
Cerebrovascular accident (CVA) due to stenosis of basilar artery
Encephalopathy
Encephalopathy

## 2021-02-12 NOTE — PROGRESS NOTE ADULT - PROBLEM SELECTOR PLAN 1
-test BG Q1 while on insulin drip.   -C/w IV insulin gtt f  - Once pt is more stable can consider transition back to sq therapy  Discharge Plan:   - Patient may be able to be discharged on home regimen pending inpatient course. Doses TBD.  - Patient can f/u with Endocrine practice as follows  [address], [address]  [facility] [address]  [phone]  -Needs Optho follow up  -Plan discussed with pt/team.  Contact info: [phone] (24/7). pager [phone]

## 2021-02-12 NOTE — PROGRESS NOTE ADULT - PROVIDER SPECIALTY LIST ADULT
Neurology
Endocrinology
Hospitalist
Hospitalist
MICU
Neurology
Neurology
Endocrinology
MICU
Neurology
Pulmonology
Endocrinology
Hospitalist
Neurology
Pulmonology
Endocrinology
Hospitalist

## 2021-02-12 NOTE — CHART NOTE - NSCHARTNOTEFT_GEN_A_CORE
Patient went into Vtach arrest at 14:55 requiring epi x2, Amiodarone 300 x1, and subsequently went into PEA arrest. See code sheet for further details.

## 2021-02-12 NOTE — PROGRESS NOTE ADULT - ASSESSMENT
71 yo F w/h/o uncontrolled DM2 (HbA1c 10.1). DM c/b neuropathy, CVA'15. Also HTN, HLD, chronic low back pain and sciatica. Transferred from [facility] for basilar artery stenosis, while in hospital found to have UTI and COVID-19. On Dexa for COVID  tx. Endocrine Team consulted for inpatient T2DM management. Now intubated/sedated in MICU> off steroids and on high dose pressors and TFs of vital at 10cc/hr. Glucose values mostly at goal while on insulin drip requiring 3-5 units/hr. No hypoglycemia. Spoke to ICU team to c/w insulin drip until pt is off pressors and more stable. BG goal (140-180mg/dl).     Spent 25 minutes assessing pt/labs/meds and discussing plan of care with primary team. Moderate complexity encounter on pt with uncontrolled T2DM with complications and comorbidities now with acute COVID  infection requiring ICU and insulin drip.

## 2021-02-12 NOTE — CHART NOTE - NSCHARTNOTEFT_GEN_A_CORE
DIABETES FOLLOW UP NOTE: Saw pt earlier today  INTERVAL HX: 69 yo F w/h/o uncontrolled DM2 (HbA1c 10.1). DM c/b neuropathy, CVA'15. Also HTN, HLD, chronic low back pain and sciatica. Transferred from [facility] for basilar artery stenosis, while in hospital found to have UTI and COVID-19. On Dexa for COVID  tx. Endocrine Team consulted for inpatient T2DM management. Now intubated/sedated in MICU> off steroids and on high dose pressors and TFs of vital at 10cc/hr. Glucose values mostly at goal while on insulin drip requiring 3-5 units/hr. No hypoglycemia. Noted pt went into Vtach this pm and .

## 2021-02-12 NOTE — PROGRESS NOTE ADULT - SUBJECTIVE AND OBJECTIVE BOX
Neurology Progress Note    S: Patient seen and examined in covid ICU. on airborn and contact isolation. exam worsening with dilated pupils and no corneals. stat head CTH. hold off AC    Medication:  MEDICATIONS  (STANDING):  ALBUTerol    90 MICROgram(s) HFA Inhaler 1 Puff(s) Inhalation every 6 hours  apixaban 5 milliGRAM(s) Oral two times a day  artificial  tears Solution 1 Drop(s) Both EYES every 12 hours  ascorbic acid 500 milliGRAM(s) Oral daily  atorvastatin 80 milliGRAM(s) Oral at bedtime  chlorhexidine 0.12% Liquid 15 milliLiter(s) Oral Mucosa every 12 hours  chlorhexidine 4% Liquid 1 Application(s) Topical <User Schedule>  cholecalciferol 400 Unit(s) Oral daily  cisatracurium Infusion 3 MICROgram(s)/kG/Min (18 mL/Hr) IV Continuous <Continuous>  dextrose 5% + sodium chloride 0.45%. 1000 milliLiter(s) (75 mL/Hr) IV Continuous <Continuous>  dextrose 5%. 1000 milliLiter(s) (100 mL/Hr) IV Continuous <Continuous>  dextrose 5%. 1000 milliLiter(s) (50 mL/Hr) IV Continuous <Continuous>  dextrose 5%. 1000 milliLiter(s) (100 mL/Hr) IV Continuous <Continuous>  fentaNYL   Infusion... 2 MICROgram(s)/kG/Hr (9.98 mL/Hr) IV Continuous <Continuous>  glucagon  Injectable 1 milliGRAM(s) IntraMuscular once  insulin regular Infusion 5 Unit(s)/Hr (5 mL/Hr) IV Continuous <Continuous>  midazolam Infusion 0.02 mG/kG/Hr (2 mL/Hr) IV Continuous <Continuous>  multivitamin/minerals/iron Oral Solution (CENTRUM) 15 milliLiter(s) Oral daily  pantoprazole  Injectable 40 milliGRAM(s) IV Push daily  phenylephrine    Infusion 0.4 MICROgram(s)/kG/Min (7.49 mL/Hr) IV Continuous <Continuous>  piperacillin/tazobactam IVPB.. 3.375 Gram(s) IV Intermittent every 8 hours  polyethylene glycol 3350 17 Gram(s) Oral every 12 hours  senna Syrup 10 milliLiter(s) Oral at bedtime  vasopressin Infusion 0.04 Unit(s)/Min (2.4 mL/Hr) IV Continuous <Continuous>    MEDICATIONS  (PRN):  acetaminophen    Suspension .. 650 milliGRAM(s) Oral every 4 hours PRN Temp greater or equal to 38.5C (101.3F)        Vitals:  ICU Vital Signs Last 24 Hrs  T(C): 38.3 (11 Feb 2021 08:00), Max: 38.8 (10 Feb 2021 19:00)  T(F): 100.9 (11 Feb 2021 08:00), Max: 101.8 (10 Feb 2021 19:00)  HR: 70 (11 Feb 2021 15:58) (70 - 131)  BP: --  BP(mean): --  ABP: 123/84 (11 Feb 2021 10:00) (82/67 - 146/91)  ABP(mean): 100 (11 Feb 2021 10:00) (74 - 114)  RR: 25 (11 Feb 2021 10:00) (25 - 29)  SpO2: 98% (11 Feb 2021 15:58) (86% - 100%)          General Exam:   General Appearance: sedated   Head: Normocephalic, atraumatic and no dysmorphic features  Ear, Nose, and Throat: Moist mucous membranes + ETT  CVS: S1S2+  Resp: + vent and intubated   Abd: soft NTND  Extremities: No edema, no cyanosis  Skin: No bruises, no rashes     Neurological Exam: (intubated and sedated)  Mental Status: eyes closed. no verbal not following.   Cranial Nerves: dilated pupils and no obvious reaction to light, + gag, NO corenals,NO OCC,   Motor: no spontaneous  Sensation: no withdrawal   Reflexes: 1+ throughout at biceps, brachioradialis, triceps, patellars and ankles bilaterally and equal. No clonus. R toe and L toe were both downgoing.  Coordination: unable  Gait: deferred     I personally reviewed the below data/images/labs:    CBC Full  -  ( 12 Feb 2021 08:55 )  WBC Count : 25.05 K/uL  RBC Count : 3.12 M/uL  Hemoglobin : 9.0 g/dL  Hematocrit : 29.3 %  Platelet Count - Automated : 64 K/uL  Mean Cell Volume : 93.9 fl  Mean Cell Hemoglobin : 28.8 pg  Mean Cell Hemoglobin Concentration : 30.7 gm/dL  Auto Neutrophil # : x  Auto Lymphocyte # : x  Auto Monocyte # : x  Auto Eosinophil # : x  Auto Basophil # : x  Auto Neutrophil % : x  Auto Lymphocyte % : x  Auto Monocyte % : x  Auto Eosinophil % : x  Auto Basophil % : x    02-12    146<H>  |  111<H>  |  39<H>  ----------------------------<  224<H>  4.2   |  23  |  1.47<H>    Ca    7.7<L>      12 Feb 2021 00:38  Phos  4.5     02-12  Mg     2.9     02-12    TPro  5.4<L>  /  Alb  2.4<L>  /  TBili  1.2  /  DBili  x   /  AST  239<H>  /  ALT  221<H>  /  AlkPhos  133<H>  02-12          Radiology:  -CT Head - unremarkable  -CTA head and neck 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    < from: MR Angio Neck w/wo IV Cont (02.02.21 @ 00:16) >    EXAM:  MR ANGIO BRAIN                          EXAM:  MR ANGIO NECK WAW IC                          EXAM:  MR BRAIN                            PROCEDURE DATE:  02/02/2021            INTERPRETATION:  CLINICAL INDICATION: COVID Positive, basilar stenosis, 70-year-old female with stroke, right-sided weakness and speech for 3 days, altered mental status,    Technique: Contrast brain MRI, non-contrast brain MRA and contrast neck MRA was performed.    Through the brain, sagittal and axial T1, axial T2, FLAIR, diffusion weighted images and an ADC map were obtained. Axial T1 post-contrast images were obtained and reconstructed in sagittal and coronal planes. 10 cc of intravenous gadolinium Gadavist gadolinium was administered and 0 discarded,  for contrast MRI brain and contrast enhanced MR angiography of the extracranial circulation.    MR angiography of intracranial and extracranial circulation was performed with time of flight imaging technique. Maximal intensity projection images were reviewed in multiple planes.    COMPARISON: Head CT, CT perfusion, CTA brain and neck, 1/30/2021    FINDINGS:  Brain MRI:  Foci restricted diffusion with hyperintense DWI, T2, FLAIR signal, left parietal lobe just above the occipital lobe (4:20, 400:20) concerning for new ischemia without hemorrhagic transformation. There is moderate enlargement of the ventricles and sulci greater than expected for age consistent with volume loss. There are foci of encephalomalacia including not limited to the corpus callosum genu and body (3:13). There is encephalomalacia with gliosis along the central and left paramedian goran, correlate with any prior sequela of remote ischemia (7:10, 6:9) with Wallerian degeneration  left cerebral peduncle, goran, and medullary pyramid (7:11-7). There is nonspecific foci of T2/FLAIR signal hyperintensity in the hemispheric white matter, likely microvascular disease, infectious and/or inflammatory etiologies in patient with COVID positivity not excluded with faint FLAIR hyperintensity noted along the bilateral olfactory gyri. There is a partially empty sella. No intraparenchymal hematoma, mass, extra-axial collection or midline shift. Basal cisterns remain patent.    Intact calvarium. Orbits and mastoids are unremarkable. Left maxillary mucosal thickening, with retention cyst and polyps, with mild ethmoid, frontal and sphenoid mucosal thickening.      Brain MRA:  Motion limited MRA,, calcified noncalcified plaque, causes multifocal stenosis bilateral cavernous and supraclinoid ICAs with 3.9 x 2.6 x 2.3 mm AP, TR, CC blister aneurysm extending inferiorly  right internal common carotid artery unchanged. There is flow-related signal  bilateral ICA cavernous and supraclinoid segments with  multifocal stenosis and poststenotic dilatation. Stenosis the right proximal right A1 and M1 segments (6:77). Redemonstration, stenosis and narrowing along the distal right M1, decreased right MCA branches, patent left A1, proximal A2 segments with multifocal stenosis anterior cerebral arteries. Stenosis distal left M1, multifocal stenosis distal left MCA branches, assessment limited by motion artifact.    Fetal bilateral PCAs, dominant anterior circulation, right vertebral artery crosses leftward, vertebral arteries are patent to vertebrobasilar junction, severe stenosis proximal basilar artery  above the vertebrobasilar junction with reconstitution distal basilar via P1 segments and fetal PCA's, multifocal stenosis PCA branch vessels.    Neck MRA:  The aortic arch and origins of the great vessels origins are patent..    Common and Internal Carotids: Tortuous bilateral common carotid arteries with retropharyngeal extension consistent with kissing carotids (5:35) no hemodynamically significant stenosis by NASCET criteria along origin of either right or left internal or external carotid artery.    Vertebral arteries:  Tortuous course and caliber of the bilateral vertebral arteries correlate with hypertension vessels are patent to intracranial V4 segments.    IMPRESSION:    Foci  restricted diffusion, DWI hyperintensity left inferior parietal lobe, no hemorrhagic transformation. Multifocal  T2 and FLAIR hyperintensity white matter may reflect microvascular disease,  additional etiologies not excluded with  COVIDpositivity.    Volume loss, multifocal encephalomalacia  corpus callosum body and splenium, Wallerian degeneration, left paramedian goran, medullary pyramid, no new hemorrhage or midline shift.    Severe flow limiting stenosis of the proximal basilar artery above the vertebral basilar junction with fetal origin PCAs, reconstitution of the distal basilar artery via the bilateral P1 segments.    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    Tortuous extracranial vessels likely from hypertension without hemodynamic significant stenosis at ICA origins by NASCET criteria. Tortuous vertebral arteries patent to V4 segments.    Findings discussed with Dr. Butler of neurology, at immediate time of review, 2/2/2021 at 9:10 AM.                JUDY NAILS MD; Attending Radiologist  This document has been electronically signed. Feb 2 2021  9:14AM    < end of copied text >      < from: CT Angio Chest w/ IV Cont (02.11.21 @ 18:38) >    EXAM:  CT ANGIO CHEST (W)AW IC                            PROCEDURE DATE:  02/11/2021            INTERPRETATION:  EXAMINATION: CT ANGIO CHEST WITHOUT AND OR WITH IV CONTRAST    CLINICAL INDICATION: Dyspnea    TECHNIQUE: CTA of the chest was performed for evaluation of pulmonary embolism after administration of 90 ml of Omnipaque-350, 10 ml discarded.  MIP images were reconstructed.    COMPARISON: None.    FINDINGS:    PULMONARY ARTERIES: Limited evaluation for lobar, segmental and subsegmental pulmonary embolism. No main, left or right pulmonary embolism.    AIRWAYS AND LUNGS: The central tracheobronchial tree is patent.  Left lower lobe consolidation with patchy bilateral lung opacity    MEDIASTINUM AND PLEURA: There are no enlarged mediastinal, hilar or axillary lymph nodes. The visualized portion of the thyroid gland is unremarkable. There is no pleural effusion. There is no pneumothorax.    HEART AND VESSELS: The heart is normal in size. There are atherosclerotic calcifications of the aorta and coronary arteries.  There is no pericardial effusion.    UPPER ABDOMEN: Images of the upper abdomen demonstrate enteric tube tip in stomach.    BONES AND SOFT TISSUES: The bones are unremarkable.  The soft tissues are unremarkable.    TUBES/LINES: Endotracheal tube tip in mid trachea. Right-sided central venous catheter with tip in right atrium    IMPRESSION:  Limited evaluation for lobar, segmental and subsegmental pulmonary embolism. No main, left or right pulmonary embolism.    Multifocal pneumonia                MARLENY PAULSON MD; Attending Radiologist  This document has been electronically signed. Feb 12 2021  9:54AM    < end of copied text >

## 2021-02-12 NOTE — PROGRESS NOTE ADULT - PROBLEM SELECTOR PROBLEM 1
Cerebrovascular accident (CVA) due to stenosis of basilar artery
Type 2 diabetes mellitus with hyperglycemia, with long-term current use of insulin
Cerebrovascular accident (CVA) due to stenosis of basilar artery
Type 2 diabetes mellitus with hyperglycemia, with long-term current use of insulin
Type 2 diabetes mellitus with hyperglycemia, with long-term current use of insulin
Cerebrovascular accident (CVA) due to stenosis of basilar artery
Type 2 diabetes mellitus with hyperglycemia, with long-term current use of insulin
Acute respiratory failure with hypoxia
Type 2 diabetes mellitus with hyperglycemia, with long-term current use of insulin
Type 2 diabetes mellitus with hyperglycemia, with long-term current use of insulin
Cerebrovascular accident (CVA) due to stenosis of basilar artery

## 2021-02-12 NOTE — PROGRESS NOTE ADULT - ASSESSMENT
69 y/o F with PMH of HTN, HLD, uncontrolled T2DM ( HgbA1c 10.1 on 21) c/b neuropathy, Chronic back pain with Sciatica, and CVA in  was transferred to [facility]  for possible endovascular intervention of 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 Hospital course c/b UTI with E.coli ( s/p ceftriaxone) and severe ARDS due to COVID. Patient remains in ICU setting for ongoing management.     Neuro:   Embolic Stroke  - Sedated on Versed @.04, Fent @2  - Restarted on Nimbex gtt for vent synchrony  - 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 Hernandez - multifocal stenosis including severe BA stenosis, blister aneurysm--deferred for now due to COVID     Pulm:   - Continue Mechanical Ventilation, wean as tolerated   - Currently: A/C 25/360/14/80% f/u AB.24/64/63/23  - f/u CTA chest with IV contrast for possible PE   - Plan to remain supine     CV:  Hx of HTN, HLD  - Akne 3.0 & Vaso 0.04, wean as tolerated for goal MAP >65  - TTE on 2/10  unable to evaluate LV systolic function and wall motion  accurately .The function appears preserved. The LV appears small  - Continue Lipitor 80mg daily   - Monitor CVP, 7 on      GI:   Transaminitis  - Tolerating TF  - LFTs uptrending x 3x ULN   - PPI GI ppx daily  - Last BM 2/10  - Continue Bowel Regimen with Miralax & senna  - Abdominal U/S unremarkable     :   OMARI, Hypernatremia   - Continue D5 + 1/2 NS at 75cc/hr  - Alexander in place  - Monitor Urine Output   - Monitor lytes, replete as necessary       Heme:  - Continue Eliquis, will f/u with neuro   - Monitor H/H and Plts   - LE duplex  reported negative for DVT   - f/u DIC panel     ID:   - Blood Cx  Neg, Sputum Cx G+ cocci in pairs (), MRSA neg  s/p Vanco x1  - Completed Ceftriaxone IV x3 for E.coli ( -2021)  - Urine Cx 2/10 neg   - Continue Zosyn for empiric coverage ( 02/10- )  - F/u Repeat Blood Cx 2/10   COVID ( + on )   - completed Remdesivir and Decadron   - Monitor Inflammatory Markers     Endo:  - Start Insulin gtts, hold gtt until poct > 150 then continue on   - Monitor FGS q 1 hr   - AM Cortisol 20.5     Ethics/Disposition:  Full Code    71 y/o F with PMH of HTN, HLD, uncontrolled T2DM ( HgbA1c 10.1 on 21) c/b neuropathy, Chronic back pain with Sciatica, and CVA in  was transferred to 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 Hospital course c/b UTI with E.coli ( s/p ceftriaxone) and severe ARDS due to COVID. Patient remains in ICU setting for ongoing management.     Neuro:   Embolic Stroke  - Sedated on Versed @.04, Fent @2  - Continue Nimbex gtt for vent synchrony  - 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 Hernandez - multifocal stenosis including severe BA stenosis, blister aneurysm--deferred for now due to COVID   -CT Head to r/o cerebral hemorrhage     Pulm:   - Continue Mechanical Ventilation, wean as tolerated   - Currently: A/C 25/360/14/80% f/u AB.24/64/63/23  - CTA chest with IV contrast reports to be a limited study but unremarkable for PE   - Plan to remain supine     CV:  Hx of HTN, HLD  - Continue Kane 3.0 & Vaso 0.04, wean as tolerated for goal SBP >120 -180 as d/w Dr. De Leon  - TTE on 2/10  unable to evaluate LV systolic function and wall motion  accurately .The function appears preserved. The LV appears small  - Continue Lipitor 80mg daily   - Monitor CVP, 7 on      GI:   Transaminitis  - Tolerating TF  - LFTs uptrending x 3x ULN   - PPI GI ppx daily  - Last BM 2/10  - Continue Bowel Regimen with Miralax & senna  - Abdominal U/S unremarkable     :   OMARI, Hypernatremia   - Continue D5 + 1/2 NS at 75cc/hr  - Alexander in place  - Monitor Urine Output   - Monitor lytes, replete as necessary     Heme:  - Continue Eliquis, if CT Head unremarkable discontinue Eliquis and start Argatroban   - Monitor H/H and Plts   - LE duplex  reported negative for DVT   - f/u DIC panel     ID:   - Blood Cx  Neg, Sputum Cx G+ cocci in pairs (), MRSA neg  s/p Vanco x1  - Completed Ceftriaxone IV x3 for E.coli ( -2021)  - Urine Cx 2/10 neg   - Continue Zosyn for empiric coverage ( 02/10- )  - F/u Repeat Blood Cx 2/10   COVID ( + on )   - completed Remdesivir and Decadron   - Monitor Inflammatory Markers     Endo:  - Start Insulin gtts  - Monitor FGS q 1 hr   - AM Cortisol 20.5     Ethics/Disposition:  Full Code

## 2021-02-12 NOTE — PROGRESS NOTE ADULT - ASSESSMENT
69 yo RH F with HTN, HLD, DM stroke 2016 with slurred speech and R weakness found to have COVID 19 PNA as well as UTI.  sent to [facility] as CTA demonstrates severe BA focal stenosis.  A1c 10.1%, LDL 150mg/dL, MRI with L parietal infarct, MRA with severe BA stenosis and fetal PCAs and supraclinoid ICA blister aneurysm, also multifocal stenosis in  likely large artery athero. LA neg,  doubt vasculitis. no HA. Note Parietal infarct is anterior circulation not related to BA stenosis. DRVVT/LA neg. RRT 2/8 for hypoxia, transferred to covid ICU overnight 2/10 intubated on prop/fentanyl and norepi and vaso. worse exam today dilated pupils, no corneals or OCC.   - stat CTH  - hold AC given change in exam until repeat imaging.   -  PE, ct chest neg but multifocal PNA  - now intubated and sedated  - wean sedation as possible  - avoid hypotension given severe BA stenosis. now on Norepi and vaso. SBP >110  - APLS labs including beta 2 glycoprotein, lupus anticoagulant (negative) and cardiolipin Abs (neg).  If APLS labs negative can place on Eliquis 5mg BID x 1 month followed by ASA 81 and plavix 75mg daily.  - suggest diagnostic angio with INR Dr. Hernandez - multifocal stenosis including severe BA stenosis, blister aneurysm.  will likely defer given COVID status and unlikely to change current management. will need to schedule in future. f/u with neurosx  - infectious workup  - spoke with Nsx resident Dr. León requesting consult   - high dose statin, lipitor 80mg daily  - treat infection s/p remdesiver and decadron for covid infection  - monitor inflammatory markers.   - on decadron  - avoid hypotension  - PT/OT  - check FS, glucose control <180  - GI/DVT ppx  - Counseling on diet, exercise, and medication adherence was done  - Counseling on smoking cessation and alcohol consumption offered when appropriate.  - Pain assessed and judicious use of narcotics when appropriate was discussed.    - Stroke education given when appropriate.  - Importance of fall prevention discussed.   - Differential diagnosis and plan of care discussed with patient and/or family and primary team  - Thank you for allowing me to participate in the care of this patient. Call with questions.   - spoke ICU team and attending   - C with family  Naga De Leon MD  Vascular Neurology  Office: [phone]

## 2021-02-12 NOTE — CHART NOTE - NSCHARTNOTESELECT_GEN_ALL_CORE
Abdominal Pain/Event Note
Brief Progress
Endocrine/Event Note
Event Note
Event Note
Nutrition Services
Endocrine/Event Note
Event Note
Hypoglycemia/Event Note
MICU accept note/Event Note
Nutrition Services
Speech & Swallow

## 2021-02-15 LAB
CULTURE RESULTS: SIGNIFICANT CHANGE UP
SPECIMEN SOURCE: SIGNIFICANT CHANGE UP

## 2021-02-16 LAB — IL6 FLD-MCNC: 618.9 PG/ML — HIGH (ref 0–13)

## 2021-02-19 LAB — IL2 SERPL-MCNC: 1227.3 PG/ML — HIGH (ref 175.3–858.2)

## 2022-11-09 NOTE — PHYSICAL THERAPY INITIAL EVALUATION ADULT - PERTINENT HX OF CURRENT PROBLEM, REHAB EVAL
Detail Level: Detailed Quality 110: Preventive Care And Screening: Influenza Immunization: Influenza Immunization Administered during Influenza season 70F PMH significant for CVA 2015 resulting in slurred speech, difficulty walking and weakness on her right presenting as a transfer from [facility] for basilar a. stenosis. Pt presented to [facility] yesterday for 3 day history of slurred speech and R-sided weakness. At baseline, she ambulates with a cane and is AOx3. Patient was last seen normal Thursday morning.

## 2023-02-07 NOTE — PROGRESS NOTE ADULT - PROBLEM SELECTOR PLAN 5
- Diagnosed 1/30  - S/P decadron x10 days. Completed remdesivir  - Oxygen supplementation as above  - Trend inflammatory markers (improving)/ increasing DD / neg Lower ext doppler No

## 2023-08-10 NOTE — ED PROVIDER NOTE - NSRISKOFTRANSFER_ED_A_ED
complains of pain/discomfort Deterioration of Condition En Route/Death or Disability/Increased Pain/Transportation Risk (There is always a risk of traffic delays resulting in deterioration of condition.)

## 2024-05-21 NOTE — ED PROVIDER NOTE - NS ED MD DISPO ADMITTING SERVICE
MED [Fever] : no fever [Chills] : no chills [Fatigue] : fatigue [Hot Flashes] : no hot flashes [Night Sweats] : no night sweats [Recent Change In Weight] : ~T no recent weight change [Discharge] : no discharge [Pain] : no pain [Redness] : no redness [Dryness] : no dryness  [Vision Problems] : no vision problems [Itching] : no itching [Earache] : no earache [Hearing Loss] : no hearing loss [Nosebleed] : no nosebleeds [Hoarseness] : no hoarseness [Nasal Discharge] : no nasal discharge [Sore Throat] : no sore throat [Postnasal Drip] : no postnasal drip [Chest Pain] : no chest pain [Palpitations] : no palpitations [Leg Claudication] : no leg claudication [Lower Ext Edema] : no lower extremity edema [Orthopnea] : no orthopnea [Paroxysmal Nocturnal Dyspnea] : no paroxysmal nocturnal dyspnea [Shortness Of Breath] : no shortness of breath [Wheezing] : no wheezing [Cough] : no cough [Dyspnea on Exertion] : no dyspnea on exertion [Abdominal Pain] : no abdominal pain [Nausea] : no nausea [Constipation] : no constipation [Diarrhea] : diarrhea [Vomiting] : no vomiting [Heartburn] : no heartburn [Melena] : no melena [Dysuria] : no dysuria [Incontinence] : no incontinence [Nocturia] : no nocturia [Poor Libido] : libido not poor [Hematuria] : no hematuria [Frequency] : no frequency [Vaginal Discharge] : no vaginal discharge [Dysmenorrhea] : no dysmenorrhea [Joint Pain] : no joint pain [Joint Stiffness] : no joint stiffness [Joint Swelling] : no joint swelling [Muscle Weakness] : no muscle weakness [Muscle Pain] : muscle pain [Back Pain] : back pain [Itching] : no itching [Nail Changes] : no nail changes [Hair Changes] : no hair changes [Skin Rash] : no skin rash [Headache] : no headache [Dizziness] : no dizziness [Fainting] : no fainting [Confusion] : no confusion [Memory Loss] : no memory loss [Unsteady Walking] : no ataxia [Suicidal] : not suicidal [Insomnia] : no insomnia [Anxiety] : anxiety [Depression] : no depression [Easy Bleeding] : no easy bleeding [Easy Bruising] : no easy bruising [Swollen Glands] : no swollen glands [Negative] : Heme/Lymph

## 2024-05-31 NOTE — PROGRESS NOTE ADULT - PROBLEM SELECTOR PROBLEM 3
Endoscopic procedure was done in [place] and the procedure report can be found under the media tab.    
Sepsis, due to unspecified organism, unspecified whether acute organ dysfunction present
Sepsis, due to unspecified organism, unspecified whether acute organ dysfunction present
Essential hypertension
Hypertension, unspecified type
Encephalopathy
Sepsis, due to unspecified organism, unspecified whether acute organ dysfunction present

## 2025-08-06 NOTE — ED PROVIDER NOTE - INTERNATIONAL TRAVEL
Cardiology clinic in 4 months or sooner if needed   Follow up with PCP as directed   Please notify clinic if any new concerns or any change in symptoms  
No